# Patient Record
Sex: FEMALE | Race: OTHER | NOT HISPANIC OR LATINO | URBAN - METROPOLITAN AREA
[De-identification: names, ages, dates, MRNs, and addresses within clinical notes are randomized per-mention and may not be internally consistent; named-entity substitution may affect disease eponyms.]

---

## 2017-02-04 RX ORDER — IPRATROPIUM/ALBUTEROL SULFATE 18-103MCG
0 AEROSOL WITH ADAPTER (GRAM) INHALATION
Qty: 12 | Refills: 0 | COMMUNITY
Start: 2017-02-04

## 2017-02-16 RX ORDER — LISINOPRIL 2.5 MG/1
0 TABLET ORAL
Qty: 90 | Refills: 0 | COMMUNITY
Start: 2017-02-16

## 2017-02-24 RX ORDER — DICLOFENAC SODIUM 30 MG/G
0 GEL TOPICAL
Qty: 100 | Refills: 0 | COMMUNITY
Start: 2017-02-24

## 2017-04-12 RX ORDER — SIMVASTATIN 20 MG/1
0 TABLET, FILM COATED ORAL
Qty: 90 | Refills: 0 | COMMUNITY
Start: 2017-04-12

## 2017-05-25 ENCOUNTER — EMERGENCY (EMERGENCY)
Facility: HOSPITAL | Age: 57
LOS: 1 days | Discharge: HOME | End: 2017-05-25
Attending: EMERGENCY MEDICINE | Admitting: EMERGENCY MEDICINE
Payer: COMMERCIAL

## 2017-05-25 VITALS
OXYGEN SATURATION: 99 % | TEMPERATURE: 98 F | HEART RATE: 78 BPM | RESPIRATION RATE: 16 BRPM | DIASTOLIC BLOOD PRESSURE: 95 MMHG | SYSTOLIC BLOOD PRESSURE: 146 MMHG

## 2017-05-25 DIAGNOSIS — R20.0 ANESTHESIA OF SKIN: ICD-10-CM

## 2017-05-25 LAB
ALBUMIN SERPL ELPH-MCNC: 4.3 G/DL — SIGNIFICANT CHANGE UP (ref 3.3–5)
ALP SERPL-CCNC: 101 U/L — SIGNIFICANT CHANGE UP (ref 40–120)
ALT FLD-CCNC: 40 U/L — HIGH (ref 4–33)
AST SERPL-CCNC: 28 U/L — SIGNIFICANT CHANGE UP (ref 4–32)
BASOPHILS # BLD AUTO: 0.05 K/UL — SIGNIFICANT CHANGE UP (ref 0–0.2)
BASOPHILS NFR BLD AUTO: 0.5 % — SIGNIFICANT CHANGE UP (ref 0–2)
BILIRUB SERPL-MCNC: 0.3 MG/DL — SIGNIFICANT CHANGE UP (ref 0.2–1.2)
BUN SERPL-MCNC: 14 MG/DL — SIGNIFICANT CHANGE UP (ref 7–23)
CALCIUM SERPL-MCNC: 9.7 MG/DL — SIGNIFICANT CHANGE UP (ref 8.4–10.5)
CHLORIDE SERPL-SCNC: 101 MMOL/L — SIGNIFICANT CHANGE UP (ref 98–107)
CO2 SERPL-SCNC: 25 MMOL/L — SIGNIFICANT CHANGE UP (ref 22–31)
CREAT SERPL-MCNC: 0.91 MG/DL — SIGNIFICANT CHANGE UP (ref 0.5–1.3)
EOSINOPHIL # BLD AUTO: 0.53 K/UL — HIGH (ref 0–0.5)
EOSINOPHIL NFR BLD AUTO: 5.4 % — SIGNIFICANT CHANGE UP (ref 0–6)
GLUCOSE SERPL-MCNC: 156 MG/DL — HIGH (ref 70–99)
HCT VFR BLD CALC: 42.2 % — SIGNIFICANT CHANGE UP (ref 34.5–45)
HGB BLD-MCNC: 13.6 G/DL — SIGNIFICANT CHANGE UP (ref 11.5–15.5)
IMM GRANULOCYTES NFR BLD AUTO: 0.6 % — SIGNIFICANT CHANGE UP (ref 0–1.5)
LYMPHOCYTES # BLD AUTO: 4.72 K/UL — HIGH (ref 1–3.3)
LYMPHOCYTES # BLD AUTO: 47.8 % — HIGH (ref 13–44)
MCHC RBC-ENTMCNC: 29.4 PG — SIGNIFICANT CHANGE UP (ref 27–34)
MCHC RBC-ENTMCNC: 32.2 % — SIGNIFICANT CHANGE UP (ref 32–36)
MCV RBC AUTO: 91.1 FL — SIGNIFICANT CHANGE UP (ref 80–100)
MONOCYTES # BLD AUTO: 1.08 K/UL — HIGH (ref 0–0.9)
MONOCYTES NFR BLD AUTO: 10.9 % — SIGNIFICANT CHANGE UP (ref 2–14)
NEUTROPHILS # BLD AUTO: 3.43 K/UL — SIGNIFICANT CHANGE UP (ref 1.8–7.4)
NEUTROPHILS NFR BLD AUTO: 34.8 % — LOW (ref 43–77)
PLATELET # BLD AUTO: 232 K/UL — SIGNIFICANT CHANGE UP (ref 150–400)
PMV BLD: 10.4 FL — SIGNIFICANT CHANGE UP (ref 7–13)
POTASSIUM SERPL-MCNC: 4.3 MMOL/L — SIGNIFICANT CHANGE UP (ref 3.5–5.3)
POTASSIUM SERPL-SCNC: 4.3 MMOL/L — SIGNIFICANT CHANGE UP (ref 3.5–5.3)
PROT SERPL-MCNC: 7.4 G/DL — SIGNIFICANT CHANGE UP (ref 6–8.3)
RBC # BLD: 4.63 M/UL — SIGNIFICANT CHANGE UP (ref 3.8–5.2)
RBC # FLD: 13.5 % — SIGNIFICANT CHANGE UP (ref 10.3–14.5)
SODIUM SERPL-SCNC: 141 MMOL/L — SIGNIFICANT CHANGE UP (ref 135–145)
WBC # BLD: 9.87 K/UL — SIGNIFICANT CHANGE UP (ref 3.8–10.5)
WBC # FLD AUTO: 9.87 K/UL — SIGNIFICANT CHANGE UP (ref 3.8–10.5)

## 2017-05-25 PROCEDURE — 99220: CPT

## 2017-05-25 PROCEDURE — 70450 CT HEAD/BRAIN W/O DYE: CPT | Mod: 26

## 2017-05-25 PROCEDURE — 99244 OFF/OP CNSLTJ NEW/EST MOD 40: CPT | Mod: GC

## 2017-05-25 RX ORDER — SIMVASTATIN 20 MG/1
10 TABLET, FILM COATED ORAL AT BEDTIME
Qty: 0 | Refills: 0 | Status: DISCONTINUED | OUTPATIENT
Start: 2017-05-25 | End: 2017-05-29

## 2017-05-25 RX ORDER — LISINOPRIL 2.5 MG/1
10 TABLET ORAL DAILY
Qty: 0 | Refills: 0 | Status: DISCONTINUED | OUTPATIENT
Start: 2017-05-25 | End: 2017-05-29

## 2017-05-25 RX ORDER — ACETAMINOPHEN 500 MG
975 TABLET ORAL ONCE
Qty: 0 | Refills: 0 | Status: COMPLETED | OUTPATIENT
Start: 2017-05-25 | End: 2017-05-25

## 2017-05-25 RX ADMIN — Medication 975 MILLIGRAM(S): at 21:51

## 2017-05-25 NOTE — ED ADULT NURSE NOTE - CHIEF COMPLAINT QUOTE
Sent in by PMD for left facial numbness and slurred speech since 12-1 PM. C/o left facial numbness/change in sensation. No other focal deficiet. PMH Bell's palsy, TIA, New onset DM. Dr. Hubbard evaluated pt. No code stroke.

## 2017-05-25 NOTE — ED PROVIDER NOTE - CHIEF COMPLAINT
The patient is a 57y Female complaining of L-facial heaviness; MHx of CVA - same symptoms; onset 6hrs ago.

## 2017-05-25 NOTE — ED PROVIDER NOTE - ATTENDING CONTRIBUTION TO CARE
MD Gonzales:  patient seen and evaluated with the resident.  I was present for key portions of the History & Physical, and I agree with the Impression & Plan.  MD Gonzales:  56 yo F, c/o L sided facial droop MD Gonzales:  patient seen and evaluated with the resident.  I was present for key portions of the History & Physical, and I agree with the Impression & Plan.  MD Gonzales:  58 yo F, c/o L sided facial droop; onset 12pm.  Quality:  L sided facial heaviness; much like prior CVA with same symptoms.  No weakness/numbness.  +LEAVITT on L.  No neck pain.  No photo/phonophobia.  No ataxia/dizziness.  VS - wnl.  Physical Exam: adult F, NAD, NCAT, no appreciable facial droop, strength 5/5 bilateral extremities & symmetric.  Ambulates w/o difficulty.  Impression:  TIA (subjective symptoms, resolving, > 6 hrs ago).  Plan:  CT head, neuro consult, reassess.

## 2017-05-25 NOTE — ED CDU PROVIDER NOTE - ATTENDING CONTRIBUTION TO CARE
MD Gonzales:  I have personally performed a face to face diagnostic evaluation on this patient with the PA.  I have reviewed the ACP note and agree with the history, exam, and plan of care, except as noted.  History and Exam by me shows 56 yo F, c/o L sided facial droop; onset 12pm.  Quality:  L sided facial heaviness; much like prior CVA with same symptoms.  No weakness/numbness.  +LEAVITT on L.  No neck pain.  No photo/phonophobia.  No ataxia/dizziness.  VS - wnl.  Physical Exam: adult F, NAD, NCAT, no appreciable facial droop, strength 5/5 bilateral extremities & symmetric.  Ambulates w/o difficulty.  Impression:  TIA (subjective symptoms, resolving, > 6 hrs ago).  CT head unremarkable. Seen by Neuro in ED, Plan:  CDU for MRI/MRA, reassess.

## 2017-05-25 NOTE — ED ADULT TRIAGE NOTE - CHIEF COMPLAINT QUOTE
Sent in by PMD for left facial numbness and slurred speech since 12-1 PM. PMH Bell's palsy, TIA, New onset DM. Dr. Hubbard evaluated pt. No code stroke. Sent in by PMD for left facial numbness and slurred speech since 12-1 PM. C/o left facial numbness/change in sensation. No other focal deficiet. PMH Bell's palsy, TIA, New onset DM. Dr. Hubbard evaluated pt. No code stroke.

## 2017-05-25 NOTE — ED PROVIDER NOTE - MEDICAL DECISION MAKING DETAILS
MD Gonzales:  56 yo F, c/o L sided facial droop; onset 12pm.  Quality:  L sided facial heaviness; much like prior CVA with same symptoms.  No weakness/numbness.  +LEAVITT on L.  No neck pain.  No photo/phonophobia.  No ataxia/dizziness.  VS - wnl.  Physical Exam: adult F, NAD, NCAT, no appreciable facial droop, strength 5/5 bilateral extremities & symmetric.  Ambulates w/o difficulty.  Impression:  TIA (subjective symptoms, resolving, > 6 hrs ago).  Plan:  CT head, neuro consult, reassess.

## 2017-05-25 NOTE — CONSULT NOTE ADULT - ATTENDING COMMENTS
Ms. 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 On examination today, she has mild flattening of the left nasolabial fold and her examination is otherwise intact. This is likely chronic from prior Bell's palsy, perhaps brought about by fatigue. MRI brain, MRA head and neck were normal. Okay to discharge home with outpatient follow-up from a neurologic standpoint.

## 2017-05-25 NOTE — ED CDU PROVIDER NOTE - OBJECTIVE STATEMENT
57 YOF multiple medical problems p/w left sided facial droop worse than baseline.  Associated facial tingling, and generalized weakness.  Associated headache.  Last normal 11:00 today.  No other symptoms.  No chest pain or SOB.  No apraxia, aphasia, agnosia, dysphonia, dysarthria, dysphagia, dizziness, nausea, vomiting, or loss of consciousness.    CDU PANDA Troncoso: HPI reviewed above. 57 y.o female pmhx of HTN , HLD and DM CVA 3 yeasr ago, Lavonia Palsy presents with L sided facial tingling/numbness that started this morning. Went to her PMD who sent her here for further evaluation. Pt seen in ED CT head performed, neurology consulted who recommended MRI brain w/o MRA H & N outpatient- ED to have pt sent to CDU to have performed with neuro checks.

## 2017-05-25 NOTE — ED PROVIDER NOTE - PMH
Diabetes Mellitus    GERD (gastroesophageal reflux disease)    HLD (hyperlipidemia)    Pneumonia  April 2015  Seasonal allergies    Transient ischemic attack  July 2014  Uterine Cancer    Vitamin D deficiency

## 2017-05-25 NOTE — ED CDU PROVIDER NOTE - PROGRESS NOTE DETAILS
Dr. Sanchez:  I performed a face to face bedside interview with patient regarding history of present illness, review of symptoms and past medical history. I completed an independent physical exam.  I have discussed patient's plan of care with PA.   I agree with note as stated above, having amended the EMR as needed to reflect my findings.   This includes HISTORY OF PRESENT ILLNESS, HIV, PAST MEDICAL/SURGICAL/FAMILY/SOCIAL HISTORY, ALLERGIES AND HOME MEDICATIONS, REVIEW OF SYSTEMS, PHYSICAL EXAM, and any PROGRESS NOTES during the time I functioned as the attending physician for this patient.    57F h/o CVA with mild left sided facial weakness and left facial Mari's Palsy presents with left face "heaviness" sensation, and states she had weakness yesterday.  Exam: well appearing, rrr, ctab, abd ntnd, sensation equal bilateral face, mild swelling to left face with barely perceptible droop.  Pending MRI to evaluate for new stroke. PA Ernie: Pt admits feeling better than yesterday, but still feel heaviness on the left side of the face. Awaiting MRA head w/o contrast and neck w contrast, MRI head w/o contrast.  Will monitor and reassess. CDU ATTENDING YAIMA DISCHARGE NOTE:  MRI negative for acute findings, okay to discharge, will follow up with neurology outpatient.

## 2017-05-25 NOTE — ED PROVIDER NOTE - PROGRESS NOTE DETAILS
JW PT evaluated by neurology agree no focal findings on exam.  Pt sent to CDU for rule out CVA: MRI and obs.

## 2017-05-25 NOTE — ED PROVIDER NOTE - OBJECTIVE STATEMENT
57 YOF multiple medical problems p/w left sided facial droop worse than baseline.  Associated facial tingling, and generalized weakness.  Associated headache.  Last normal 11:00 today.  No other symptoms.  No chest pain or SOB.  No apraxia, aphasia, agnosia, dysphonia, dysarthria, dysphagia, dizziness, nausea, vomiting, or loss of consciousness.
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
## 2017-05-25 NOTE — CONSULT NOTE ADULT - PROBLEM SELECTOR RECOMMENDATION 9
Recommend TIA workup: MRI brain w/o MRA H & N  Start ASA 81mg daily if no contraindications.  Optimize risk factors: BP, Cholesterol, Diabetes. Obtain CTH.  If normal, then MRI brain w/o MRA H & N as outpatient  Start ASA 81mg daily if no contraindications.  Optimize risk factors: BP, Cholesterol, Diabetes. Obtain CTH.  If normal, then MRI brain w/o MRA H & N as outpatient to r/o TIA  Start ASA 81mg daily if no contraindications.  Optimize risk factors: BP, Cholesterol, Diabetes.  Pain control for neck/head

## 2017-05-25 NOTE — ED ADULT NURSE NOTE - OBJECTIVE STATEMENT
Pt recd A+Ox3. C/o left sided facial numbness since 12:30pm with head ache, neck pain and fatigue. Pt reports that she feels as if her speech is slurred but no slurring noticed on assessment. Denies any extremity weakness, CP or SOB. No facial droop noted on assessment. B/l UE and LE with equal strength and movement. PMH of CVA with no residual weakness and bells palsy. Cardiac monitor in place. Will continue to monitor.

## 2017-05-25 NOTE — ED CDU PROVIDER NOTE - MEDICAL DECISION MAKING DETAILS
57 y.o female pmhx of HTN, HLD , DM, CVA here with L sided facial tingling and numbness. MRI brain w/o MRA head & neck, neuro.

## 2017-05-26 VITALS
RESPIRATION RATE: 14 BRPM | OXYGEN SATURATION: 96 % | HEART RATE: 76 BPM | SYSTOLIC BLOOD PRESSURE: 135 MMHG | DIASTOLIC BLOOD PRESSURE: 70 MMHG | TEMPERATURE: 98 F

## 2017-05-26 LAB — HBA1C BLD-MCNC: 7 % — HIGH (ref 4–5.6)

## 2017-05-26 PROCEDURE — 70548 MR ANGIOGRAPHY NECK W/DYE: CPT | Mod: 26

## 2017-05-26 PROCEDURE — 70551 MRI BRAIN STEM W/O DYE: CPT | Mod: 26

## 2017-05-26 PROCEDURE — 99217: CPT

## 2017-05-26 RX ADMIN — LISINOPRIL 10 MILLIGRAM(S): 2.5 TABLET ORAL at 11:31

## 2017-05-26 RX ADMIN — SIMVASTATIN 10 MILLIGRAM(S): 20 TABLET, FILM COATED ORAL at 00:09

## 2017-05-26 RX ADMIN — Medication 2 MILLIGRAM(S): at 09:17

## 2017-06-20 RX ORDER — ALBUTEROL 90 UG/1
0 AEROSOL, METERED ORAL
Qty: 6 | Refills: 0 | COMMUNITY
Start: 2017-06-20

## 2017-06-20 RX ORDER — HYDROXYZINE HCL 10 MG
0 TABLET ORAL
Qty: 20 | Refills: 0 | COMMUNITY
Start: 2017-06-20

## 2017-06-20 RX ORDER — MONTELUKAST 4 MG/1
0 TABLET, CHEWABLE ORAL
Qty: 30 | Refills: 0 | COMMUNITY
Start: 2017-06-20

## 2017-06-20 RX ORDER — FLUTICASONE PROPIONATE AND SALMETEROL 50; 250 UG/1; UG/1
0 POWDER ORAL; RESPIRATORY (INHALATION)
Qty: 60 | Refills: 0 | COMMUNITY
Start: 2017-06-20

## 2017-06-24 RX ORDER — GABAPENTIN 400 MG/1
0 CAPSULE ORAL
Qty: 90 | Refills: 0 | COMMUNITY
Start: 2017-06-24

## 2017-06-24 RX ORDER — LIDOCAINE 4 G/100G
0 CREAM TOPICAL
Qty: 30 | Refills: 0 | COMMUNITY
Start: 2017-06-24

## 2017-08-15 DIAGNOSIS — Z87.09 PERSONAL HISTORY OF OTHER DISEASES OF THE RESPIRATORY SYSTEM: ICD-10-CM

## 2017-08-16 ENCOUNTER — INPATIENT (INPATIENT)
Facility: HOSPITAL | Age: 57
LOS: 1 days | Discharge: HOME | End: 2017-08-18
Attending: HOSPITALIST | Admitting: HOSPITALIST
Payer: COMMERCIAL

## 2017-08-16 VITALS
RESPIRATION RATE: 16 BRPM | TEMPERATURE: 99 F | OXYGEN SATURATION: 100 % | DIASTOLIC BLOOD PRESSURE: 88 MMHG | HEART RATE: 79 BPM | SYSTOLIC BLOOD PRESSURE: 149 MMHG

## 2017-08-16 LAB
ALBUMIN SERPL ELPH-MCNC: 4.3 G/DL — SIGNIFICANT CHANGE UP (ref 3.3–5)
ALP SERPL-CCNC: 84 U/L — SIGNIFICANT CHANGE UP (ref 40–120)
ALT FLD-CCNC: 30 U/L — SIGNIFICANT CHANGE UP (ref 4–33)
AST SERPL-CCNC: 35 U/L — HIGH (ref 4–32)
BASE EXCESS BLDV CALC-SCNC: -1.1 MMOL/L — SIGNIFICANT CHANGE UP
BASOPHILS # BLD AUTO: 0.06 K/UL — SIGNIFICANT CHANGE UP (ref 0–0.2)
BASOPHILS NFR BLD AUTO: 0.5 % — SIGNIFICANT CHANGE UP (ref 0–2)
BILIRUB SERPL-MCNC: 1.1 MG/DL — SIGNIFICANT CHANGE UP (ref 0.2–1.2)
BLOOD GAS VENOUS - CREATININE: 0.5 MG/DL — SIGNIFICANT CHANGE UP (ref 0.5–1.3)
BUN SERPL-MCNC: 9 MG/DL — SIGNIFICANT CHANGE UP (ref 7–23)
CALCIUM SERPL-MCNC: 9.2 MG/DL — SIGNIFICANT CHANGE UP (ref 8.4–10.5)
CHLORIDE BLDV-SCNC: 107 MMOL/L — SIGNIFICANT CHANGE UP (ref 96–108)
CHLORIDE SERPL-SCNC: 100 MMOL/L — SIGNIFICANT CHANGE UP (ref 98–107)
CO2 SERPL-SCNC: 24 MMOL/L — SIGNIFICANT CHANGE UP (ref 22–31)
CREAT SERPL-MCNC: 0.75 MG/DL — SIGNIFICANT CHANGE UP (ref 0.5–1.3)
EOSINOPHIL # BLD AUTO: 0.51 K/UL — HIGH (ref 0–0.5)
EOSINOPHIL NFR BLD AUTO: 4.2 % — SIGNIFICANT CHANGE UP (ref 0–6)
GAS PNL BLDV: 130 MMOL/L — LOW (ref 136–146)
GLUCOSE BLDV-MCNC: 129 — HIGH (ref 70–99)
GLUCOSE SERPL-MCNC: 145 MG/DL — HIGH (ref 70–99)
HCO3 BLDV-SCNC: 23 MMOL/L — SIGNIFICANT CHANGE UP (ref 20–27)
HCT VFR BLD CALC: 46.3 % — HIGH (ref 34.5–45)
HCT VFR BLDV CALC: 42.1 % — SIGNIFICANT CHANGE UP (ref 34.5–45)
HGB BLD-MCNC: 15.1 G/DL — SIGNIFICANT CHANGE UP (ref 11.5–15.5)
HGB BLDV-MCNC: 13.7 G/DL — SIGNIFICANT CHANGE UP (ref 11.5–15.5)
IMM GRANULOCYTES # BLD AUTO: 0.05 # — SIGNIFICANT CHANGE UP
IMM GRANULOCYTES NFR BLD AUTO: 0.4 % — SIGNIFICANT CHANGE UP (ref 0–1.5)
LACTATE BLDV-MCNC: 1.8 MMOL/L — SIGNIFICANT CHANGE UP (ref 0.5–2)
LYMPHOCYTES # BLD AUTO: 2.61 K/UL — SIGNIFICANT CHANGE UP (ref 1–3.3)
LYMPHOCYTES # BLD AUTO: 21.6 % — SIGNIFICANT CHANGE UP (ref 13–44)
MCHC RBC-ENTMCNC: 29.4 PG — SIGNIFICANT CHANGE UP (ref 27–34)
MCHC RBC-ENTMCNC: 32.6 % — SIGNIFICANT CHANGE UP (ref 32–36)
MCV RBC AUTO: 90.1 FL — SIGNIFICANT CHANGE UP (ref 80–100)
MONOCYTES # BLD AUTO: 1.46 K/UL — HIGH (ref 0–0.9)
MONOCYTES NFR BLD AUTO: 12.1 % — SIGNIFICANT CHANGE UP (ref 2–14)
NEUTROPHILS # BLD AUTO: 7.38 K/UL — SIGNIFICANT CHANGE UP (ref 1.8–7.4)
NEUTROPHILS NFR BLD AUTO: 61.2 % — SIGNIFICANT CHANGE UP (ref 43–77)
NRBC # FLD: 0 — SIGNIFICANT CHANGE UP
PCO2 BLDV: 46 MMHG — SIGNIFICANT CHANGE UP (ref 41–51)
PH BLDV: 7.34 PH — SIGNIFICANT CHANGE UP (ref 7.32–7.43)
PLATELET # BLD AUTO: 195 K/UL — SIGNIFICANT CHANGE UP (ref 150–400)
PMV BLD: 10.7 FL — SIGNIFICANT CHANGE UP (ref 7–13)
PO2 BLDV: 54 MMHG — HIGH (ref 35–40)
POTASSIUM BLDV-SCNC: 9.5 MMOL/L — CRITICAL HIGH (ref 3.4–4.5)
POTASSIUM SERPL-MCNC: 5 MMOL/L — SIGNIFICANT CHANGE UP (ref 3.5–5.3)
POTASSIUM SERPL-SCNC: 5 MMOL/L — SIGNIFICANT CHANGE UP (ref 3.5–5.3)
PROT SERPL-MCNC: 7.7 G/DL — SIGNIFICANT CHANGE UP (ref 6–8.3)
RBC # BLD: 5.14 M/UL — SIGNIFICANT CHANGE UP (ref 3.8–5.2)
RBC # FLD: 13.1 % — SIGNIFICANT CHANGE UP (ref 10.3–14.5)
SAO2 % BLDV: 86.6 % — HIGH (ref 60–85)
SODIUM SERPL-SCNC: 140 MMOL/L — SIGNIFICANT CHANGE UP (ref 135–145)
WBC # BLD: 12.07 K/UL — HIGH (ref 3.8–10.5)
WBC # FLD AUTO: 12.07 K/UL — HIGH (ref 3.8–10.5)

## 2017-08-16 RX ORDER — ONDANSETRON 8 MG/1
4 TABLET, FILM COATED ORAL ONCE
Qty: 0 | Refills: 0 | Status: COMPLETED | OUTPATIENT
Start: 2017-08-16 | End: 2017-08-16

## 2017-08-16 RX ORDER — MORPHINE SULFATE 50 MG/1
4 CAPSULE, EXTENDED RELEASE ORAL ONCE
Qty: 0 | Refills: 0 | Status: DISCONTINUED | OUTPATIENT
Start: 2017-08-16 | End: 2017-08-16

## 2017-08-16 RX ORDER — DEXAMETHASONE 0.5 MG/5ML
10 ELIXIR ORAL ONCE
Qty: 0 | Refills: 0 | Status: COMPLETED | OUTPATIENT
Start: 2017-08-16 | End: 2017-08-16

## 2017-08-16 RX ORDER — SODIUM CHLORIDE 9 MG/ML
1000 INJECTION INTRAMUSCULAR; INTRAVENOUS; SUBCUTANEOUS ONCE
Qty: 0 | Refills: 0 | Status: COMPLETED | OUTPATIENT
Start: 2017-08-16 | End: 2017-08-16

## 2017-08-16 RX ADMIN — ONDANSETRON 4 MILLIGRAM(S): 8 TABLET, FILM COATED ORAL at 23:09

## 2017-08-16 RX ADMIN — MORPHINE SULFATE 4 MILLIGRAM(S): 50 CAPSULE, EXTENDED RELEASE ORAL at 22:44

## 2017-08-16 RX ADMIN — Medication 100 MILLIGRAM(S): at 23:54

## 2017-08-16 RX ADMIN — MORPHINE SULFATE 4 MILLIGRAM(S): 50 CAPSULE, EXTENDED RELEASE ORAL at 23:08

## 2017-08-16 RX ADMIN — SODIUM CHLORIDE 1000 MILLILITER(S): 9 INJECTION INTRAMUSCULAR; INTRAVENOUS; SUBCUTANEOUS at 22:44

## 2017-08-16 NOTE — CONSULT NOTE ADULT - ASSESSMENT
57F w/ acute pharyngitis w/ concern for possible abscess  -no acute ORL intervention  -decadron 10mg IV x 1  -clindamycin 900mg IV x 1, likely transition to 7 day course of PO clinda  -f/u CT neck w/ contrast  -Dr. Painting to scope  -will f/u imaging  -upon discharge, can f/u [phone]  -call with questions 57F w/ supraglottitis w/ concern for possible abscess along posterior pharynx  -no acute ORL intervention  -decadron 10mg IV q8 x 3 doses  -clindamycin 900mg IV q8  -ceftriaxone 1g q24  -f/u CT neck w/ contrast  -cont pulse ox  -supporitive o2 if needed, humidified  -will plan for repeat scope tomorrow morning  -page ORL stat if any change in respiratory status  -d/w Dr. Painting  -will follow 57F w/ supraglottitis w/ concern for possible abscess along posterior pharynx  -no acute ORL intervention  -decadron 10mg IV q8 x 3 doses  -clindamycin 900mg IV q8  -ceftriaxone 1g q24  -ppi  -f/u CT neck w/ contrast  -cont pulse ox  -supporitive o2 if needed, humidified  -will plan for repeat scope tomorrow morning  -page ORL stat if any change in respiratory status  -d/w Dr. Painting  -will follow

## 2017-08-16 NOTE — CONSULT NOTE ADULT - SUBJECTIVE AND OBJECTIVE BOX
57F PMH diabetes, uterine ca p/w severe throat pain over past 2-3 days. Was sent from [facility] for concern for possible pharyngeal abscess. Patient states the pain has been progressively worsening with inability to take PO solid or liquids 2/2 pain. States the pain is on both sides and notes b/l ear pain. Denies any recent illnesses, fevers, n/v, CP, SOB, smoking history, rhinorrhea/nasal congestion, sick contacts.    AVSS  NAD, lying in bed  nonlabored breathign on RA, no stridor, no retractions, muffled voice  EOMI, PERRL  b/l EAC clear, TM clear and wnl  NC clear  OC/OP: tonsils 1+ b/l, marked erythema of the posterior pharyngeal wall with mild edema and fullness b/l, small amount of thick, purulent appearing mucus along R posterior naso-oropharynx  neck soft, flat, mild ttp midline, no fluctuance, FROM    wbc 12    FOE: Dr. Painting to scope 57F PMH diabetes, uterine ca p/w severe throat pain over past 2-3 days. Was sent from [facility] for concern for possible pharyngeal abscess. Patient states the pain has been progressively worsening with inability to take PO solid or liquids 2/2 pain. States the pain is on both sides and notes b/l ear pain. Denies any recent illnesses, fevers, n/v, CP, SOB, smoking history, rhinorrhea/nasal congestion, sick contacts.    AVSS  NAD, lying in bed  nonlabored breathign on RA, no stridor, no retractions, muffled voice  EOMI, PERRL  b/l EAC clear, TM clear and wnl  NC clear  OC/OP: tonsils 1+ b/l, marked erythema of the posterior pharyngeal wall with mild edema and fullness b/l, small amount of thick, purulent appearing mucus along R posterior naso-oropharynx  neck soft, flat, mild ttp midline, no fluctuance, FROM    wbc 12    FOE: nc clear  np clear  op w/ erythema and mild posterior pharyngeal swelling  epiglottis sharp, minimal erythema, no noted swelling  ae folds sharp  b/l arytenoids with erythema and swelling obstructing posterior 1/3 of VC  b/l TVC mobility noted, no swelling  subglottis patent, airway patent  hypopharynx wnl  lingual tonsils erythematous and mildly swollen

## 2017-08-16 NOTE — ED ADULT NURSE NOTE - OBJECTIVE STATEMENT
(facilitating) Pt presents to ED TRA c/o 10/10 throat pain with difficulty swallowing, B/L ear pain, chills, headache x3 days, worsening. Denies fevers/ abd pain/ N/V/ SOB/ CP/ urinary changes/ other acute symptoms. pt is in NAD, respirations even and unlabored. PMH CVA with residual facial weakness; no facial droop noted. VSS. IV inserted, BW collected and sent to lab. Meds administered as per EMAR. Report endorsed to Daniella REDDY.

## 2017-08-16 NOTE — ED PROVIDER NOTE - OBJECTIVE STATEMENT
57 F with hx of DM, presents with sore throat worsening over past few days, no fever/chills, had difficulty swallowing but no difficulty breathing, increased secretions.  Seen at urgent care who sent patient to ER to r/o abscess.

## 2017-08-16 NOTE — ED PROVIDER NOTE - PROGRESS NOTE DETAILS
ENT resident scoped patient, saw some irritation in pharynx, recommending clindamycin and dexamethasone and CT scan. Duc PIEDRA: CT scan with no abscess but small cystic lesion in right aryeepiglotic fold ?early abscess vs neoplasm.  Pt also noted to be slightly hypoxic - sat 94-95% on room air.  She c/o some pain to the throat but improved.  No SOB, voice changes, drooling, stridor.  ENT is aware, requesting CDU for observation (requests notification if acute resp distress or O2sat <90%), cont IV abx (clinda and ctx), decadron.  They plan to re-scope and reassess in the morning.

## 2017-08-16 NOTE — ED ADULT TRIAGE NOTE - CHIEF COMPLAINT QUOTE
Pt sent from urgent care for r/o pharyngeal abscess. Pt has been having sore throat and difficulty swallowing since Monday. Denies fevers/chills. H/o DM, asthma, CVA, MI, uterine CA.

## 2017-08-17 DIAGNOSIS — R13.10 DYSPHAGIA, UNSPECIFIED: ICD-10-CM

## 2017-08-17 DIAGNOSIS — I10 ESSENTIAL (PRIMARY) HYPERTENSION: ICD-10-CM

## 2017-08-17 DIAGNOSIS — J04.30 SUPRAGLOTTITIS, UNSPECIFIED, WITHOUT OBSTRUCTION: ICD-10-CM

## 2017-08-17 DIAGNOSIS — C55 MALIGNANT NEOPLASM OF UTERUS, PART UNSPECIFIED: ICD-10-CM

## 2017-08-17 DIAGNOSIS — E11.9 TYPE 2 DIABETES MELLITUS WITHOUT COMPLICATIONS: ICD-10-CM

## 2017-08-17 DIAGNOSIS — J45.909 UNSPECIFIED ASTHMA, UNCOMPLICATED: ICD-10-CM

## 2017-08-17 DIAGNOSIS — Z29.9 ENCOUNTER FOR PROPHYLACTIC MEASURES, UNSPECIFIED: ICD-10-CM

## 2017-08-17 DIAGNOSIS — R23.8 OTHER SKIN CHANGES: ICD-10-CM

## 2017-08-17 DIAGNOSIS — Z90.710 ACQUIRED ABSENCE OF BOTH CERVIX AND UTERUS: Chronic | ICD-10-CM

## 2017-08-17 DIAGNOSIS — G45.9 TRANSIENT CEREBRAL ISCHEMIC ATTACK, UNSPECIFIED: ICD-10-CM

## 2017-08-17 LAB — HBA1C BLD-MCNC: 6.7 % — HIGH (ref 4–5.6)

## 2017-08-17 PROCEDURE — 71020: CPT | Mod: 26

## 2017-08-17 PROCEDURE — 70491 CT SOFT TISSUE NECK W/DYE: CPT | Mod: 26

## 2017-08-17 PROCEDURE — 99233 SBSQ HOSP IP/OBS HIGH 50: CPT | Mod: GC

## 2017-08-17 RX ORDER — ACETAMINOPHEN 500 MG
1000 TABLET ORAL ONCE
Qty: 0 | Refills: 0 | Status: COMPLETED | OUTPATIENT
Start: 2017-08-17 | End: 2017-08-17

## 2017-08-17 RX ORDER — ENOXAPARIN SODIUM 100 MG/ML
40 INJECTION SUBCUTANEOUS EVERY 24 HOURS
Qty: 0 | Refills: 0 | Status: DISCONTINUED | OUTPATIENT
Start: 2017-08-17 | End: 2017-08-18

## 2017-08-17 RX ORDER — INSULIN LISPRO 100/ML
VIAL (ML) SUBCUTANEOUS
Qty: 0 | Refills: 0 | Status: DISCONTINUED | OUTPATIENT
Start: 2017-08-17 | End: 2017-08-18

## 2017-08-17 RX ORDER — PANTOPRAZOLE SODIUM 20 MG/1
40 TABLET, DELAYED RELEASE ORAL DAILY
Qty: 0 | Refills: 0 | Status: DISCONTINUED | OUTPATIENT
Start: 2017-08-17 | End: 2017-08-18

## 2017-08-17 RX ORDER — DEXAMETHASONE 0.5 MG/5ML
10 ELIXIR ORAL
Qty: 0 | Refills: 0 | Status: COMPLETED | OUTPATIENT
Start: 2017-08-17 | End: 2017-08-17

## 2017-08-17 RX ORDER — CEFTRIAXONE 500 MG/1
1 INJECTION, POWDER, FOR SOLUTION INTRAMUSCULAR; INTRAVENOUS EVERY 24 HOURS
Qty: 0 | Refills: 0 | Status: DISCONTINUED | OUTPATIENT
Start: 2017-08-18 | End: 2017-08-18

## 2017-08-17 RX ORDER — KETOROLAC TROMETHAMINE 30 MG/ML
15 SYRINGE (ML) INJECTION ONCE
Qty: 0 | Refills: 0 | Status: DISCONTINUED | OUTPATIENT
Start: 2017-08-17 | End: 2017-08-17

## 2017-08-17 RX ORDER — DEXTROSE 50 % IN WATER 50 %
12.5 SYRINGE (ML) INTRAVENOUS ONCE
Qty: 0 | Refills: 0 | Status: DISCONTINUED | OUTPATIENT
Start: 2017-08-17 | End: 2017-08-18

## 2017-08-17 RX ORDER — ACETAMINOPHEN 500 MG
650 TABLET ORAL EVERY 6 HOURS
Qty: 0 | Refills: 0 | Status: DISCONTINUED | OUTPATIENT
Start: 2017-08-17 | End: 2017-08-18

## 2017-08-17 RX ORDER — INSULIN LISPRO 100/ML
VIAL (ML) SUBCUTANEOUS AT BEDTIME
Qty: 0 | Refills: 0 | Status: DISCONTINUED | OUTPATIENT
Start: 2017-08-17 | End: 2017-08-18

## 2017-08-17 RX ORDER — SODIUM CHLORIDE 9 MG/ML
1000 INJECTION INTRAMUSCULAR; INTRAVENOUS; SUBCUTANEOUS
Qty: 0 | Refills: 0 | Status: DISCONTINUED | OUTPATIENT
Start: 2017-08-17 | End: 2017-08-18

## 2017-08-17 RX ORDER — FLUTICASONE PROPIONATE AND SALMETEROL 50; 250 UG/1; UG/1
0 POWDER ORAL; RESPIRATORY (INHALATION)
Qty: 0 | Refills: 0 | COMMUNITY

## 2017-08-17 RX ORDER — CEFTRIAXONE 500 MG/1
1 INJECTION, POWDER, FOR SOLUTION INTRAMUSCULAR; INTRAVENOUS ONCE
Qty: 0 | Refills: 0 | Status: COMPLETED | OUTPATIENT
Start: 2017-08-17 | End: 2017-08-17

## 2017-08-17 RX ADMIN — PANTOPRAZOLE SODIUM 40 MILLIGRAM(S): 20 TABLET, DELAYED RELEASE ORAL at 14:11

## 2017-08-17 RX ADMIN — Medication 102 MILLIGRAM(S): at 18:17

## 2017-08-17 RX ADMIN — Medication 100 MILLIGRAM(S): at 22:46

## 2017-08-17 RX ADMIN — Medication 650 MILLIGRAM(S): at 23:42

## 2017-08-17 RX ADMIN — Medication 650 MILLIGRAM(S): at 17:31

## 2017-08-17 RX ADMIN — Medication 1000 MILLIGRAM(S): at 09:15

## 2017-08-17 RX ADMIN — CEFTRIAXONE 100 GRAM(S): 500 INJECTION, POWDER, FOR SOLUTION INTRAMUSCULAR; INTRAVENOUS at 02:53

## 2017-08-17 RX ADMIN — Medication 1000 MILLIGRAM(S): at 02:25

## 2017-08-17 RX ADMIN — Medication 100 MILLIGRAM(S): at 14:17

## 2017-08-17 RX ADMIN — Medication 102 MILLIGRAM(S): at 02:10

## 2017-08-17 RX ADMIN — Medication 15 MILLIGRAM(S): at 05:25

## 2017-08-17 RX ADMIN — SODIUM CHLORIDE 125 MILLILITER(S): 9 INJECTION INTRAMUSCULAR; INTRAVENOUS; SUBCUTANEOUS at 06:40

## 2017-08-17 RX ADMIN — Medication 400 MILLIGRAM(S): at 01:55

## 2017-08-17 RX ADMIN — Medication 1: at 17:33

## 2017-08-17 RX ADMIN — Medication 650 MILLIGRAM(S): at 18:00

## 2017-08-17 RX ADMIN — Medication 400 MILLIGRAM(S): at 08:56

## 2017-08-17 RX ADMIN — ENOXAPARIN SODIUM 40 MILLIGRAM(S): 100 INJECTION SUBCUTANEOUS at 22:45

## 2017-08-17 RX ADMIN — SODIUM CHLORIDE 125 MILLILITER(S): 9 INJECTION INTRAMUSCULAR; INTRAVENOUS; SUBCUTANEOUS at 14:17

## 2017-08-17 RX ADMIN — Medication 102 MILLIGRAM(S): at 10:22

## 2017-08-17 RX ADMIN — Medication 100 MILLIGRAM(S): at 06:54

## 2017-08-17 NOTE — CONSULT NOTE ADULT - ENMT COMMENTS
as per hpi
Flexible Fiberoptic Laryngoscopy: clear nasopharynx to glottis, true vocal cords mobile bilaterally, mild supraglottis edema, airway widely patent

## 2017-08-17 NOTE — H&P ADULT - PROBLEM SELECTOR PLAN 3
HgA1C was 6.7 on day prior to admission, pt on one oral agent at home  - ISS  - FS testing with meals and at bedtime HgA1C was 6.7 on day prior to admission, pt on one oral agent at home. Blood glucose may increase due to steroids.  - ISS  - FS testing with meals and at bedtime

## 2017-08-17 NOTE — H&P ADULT - PROBLEM SELECTOR PLAN 1
Multiple fiberoptic scopes performed by ENT. Pt afebrile and vitally stable, but now with leukocytosis.  - continue ceftriaxone 1g Q24H and clindamycin 900mg Q8H  - Pt saturating well on room air and denies SOB, will escalate to 2L NC if she becomes acutely SOB or saturation drops  - routine vital signs  - trend WBC  - page ORL stat if any change in respiratory status Multiple fiberoptic scopes performed by ENT. Pt afebrile and vitally stable, but now with leukocytosis.  - continue ceftriaxone 1g Q24H and clindamycin 900mg Q8H for a total of 7 - 10 days depending on response  - Pt saturating well on room air and denies SOB, will escalate to 2L NC if she becomes acutely SOB or saturation drops  - routine vital signs  - trend WBC  - blood Cx  - page ORL stat if any change in respiratory status

## 2017-08-17 NOTE — H&P ADULT - ATTENDING COMMENTS
Patient seen and examined.  Case discussed with house staff.  Agree with above as edited.  57yoF with hx listed above admitted with supraglottitis vs abscess  Appreciate ENT eval and direct visualization.  Check BCx  C/w IV clinda and ceftriaxone - plan for 7-10 days  Decadron x 3 doses  check viral swab of tongue vesicle to r/o HSV  IVFL until eating better  h/o TIA - will d/w PMD - likely would rec. ASA 81mg on d/c

## 2017-08-17 NOTE — PROGRESS NOTE ADULT - SUBJECTIVE AND OBJECTIVE BOX
Patient seen and examined.    Per overnight staff, patient was desaturating to 92 on RA and was started on 2L NC as a result. Voice remains hoarse and patient had not been able to tolerate PO during the night.    NAD, alert, awake, lying in bed  On 2L nc, no stridor no stertor  Voice hoarse  FOE: stable arytenoid edema and erythema obstructing view of the posterior cords, otherwise wnl    CT neck  Cystic focus centered in the right aryepiglottic fold with fold thickening  and local mass effect with narrowing of the laryngeal vestibule/hypopharynx  and piriform sinus. Cervical adenopathy.  Differential diagnosis considerations include early abscess formation/focal  edema versus cystic neoplasm. Consider direct inspection.    57F w/ supraglottitis w/ concern for possible abscess along posterior pharynx  -no acute ORL intervention  -decadron 10mg IV q8 x 3 doses  -clindamycin 900mg IV q8  -ceftriaxone 1g q24  -ppi  -cont pulse ox  -supportive o2 if needed, humidified  -page ORL stat if any change in respiratory status

## 2017-08-17 NOTE — ED CDU PROVIDER NOTE - CONSTITUTIONAL, MLM
normal... Well appearing, well nourished, awake, alert, oriented to person, place, time/situation and in no apparent distress.  Pt. objectively appears comfortable; pt is verbalizing with hoarseness, but no stridor or drooling; pt is verbalizing in effortless full sentences.

## 2017-08-17 NOTE — H&P ADULT - PROBLEM SELECTOR PLAN 5
Pt has hx of asthma, is prescribed singulair, advair, and a rescue inhaler. Pt not taking any of her home inhalers regularly. Reports she last used rescue inhaler over a month ago and singulair two weeks ago. Currently not wheezing or SOB on exam.  - Duonebs PRN for wheezing  - TIA in 2014, pt currently on no anti-platelet agents at home. Will contact PMD for verification and more records. Hold off on aspirin for now in case pt needs ENT intervention.

## 2017-08-17 NOTE — ED CDU PROVIDER NOTE - PMH
Diabetes Mellitus    GERD (gastroesophageal reflux disease)    HLD (hyperlipidemia)    Pneumonia  April 2015  Seasonal allergies    Transient ischemic attack  July 2014  Uterine Cancer  (diagnosed in 1999; subsequent TAHBSO and RTX)  Vitamin D deficiency

## 2017-08-17 NOTE — CONSULT NOTE ADULT - ASSESSMENT
Assessment: 57F PMH diabetes, uterine ca p/w severe throat pain over past 2-3 days. DDx parapharyngeal abscess, retropharyngeal abscess, pharyngitis versus early Supraglottits, Airway widely patent    Plan:  1) airway observation  2) Clindamycin 900mg IV q8hrs  3) Decadron 10mg IV q8hrs x 3 doses  4) tight BS control

## 2017-08-17 NOTE — ED CDU PROVIDER NOTE - PROGRESS NOTE DETAILS
CDU PA Ernie: Pt PO challenged, was able to keep liquid down with pain. Gave pt acetaminophen IV and placed her on liquid diet. ENT scoped the pt and wants to admit the pt to medicine service. Hospitalist paged, will monitor and reassess CDU PA Ernie: Pt admits to feeling a little better, still looks ill. Stating 99 on nasal cannula. Pt PO challenged, was able to keep liquid down with pain. Gave pt acetaminophen IV and placed her on liquid diet. ENT scoped the pt and wants to admit the pt to medicine service. Hospitalist paged, will monitor and reassess Olya: Seen on AM rounds. Clinically-stable. Olya: Supervised PANDA Klein 8/17/17. Olya (Physician) CDU ATTENDING D/C NOTE for hospital admission: 57 F, DM, sore throat worsening over past few days, no F/C, had difficulty swallowing but no difficulty breathing.  CT neck concerning for abscess vs. neoplasm. Appears well. NAD. AFVSS. Strong pulse. Respirations unlabored. No stridor. In CDU, patient in significant pain despite ABx and steroids and pain medications. Will admit to hospital for continued ABx, pain meds, and ENT f/u.

## 2017-08-17 NOTE — ED CDU PROVIDER NOTE - THROAT FINDINGS
No tonsillar inflammation or exudates noted; structures to posterior pharynx appear symmetrical; uvula midline.  No edema noted.

## 2017-08-17 NOTE — CONSULT NOTE ADULT - SUBJECTIVE AND OBJECTIVE BOX
Objective: 	  57F PMH diabetes, uterine ca p/w severe throat pain over past 2-3 days. [facility] for possible ph[facility] abscess. Patient states the pain has been progressively worsening with inability to take PO solid or liquids 2/2 pain. States the pain is on both sides and notes b/l ear pain. Denies any recent illnesses, fevers, n/v, CP, SOB, smoking history, rhinorrhea/nasal congestion, sick contacts.    · Presenting Symptoms: THROAT PAIN	  · Negative Findings: no fever	  · Location: throat	  · Area: generalized	  · Timing: worsening	  · Duration: day(s)	  · Context: unknown	  · Recent Exposure To: none known	  · Aggravating Factors: none	  · Relieving Factors: none	    HIV:   HIV Status:  · Offered: Declined	    PAST MEDICAL/SURGICAL/FAMILY/SOCIAL HISTORY:   Past Medical History:  Diabetes Mellitus    GERD (gastroesophageal reflux disease)    HLD (hyperlipidemia)    Pneumonia  April 2015  Seasonal allergies    Transient ischemic attack  July 2014  Uterine Cancer    Vitamin D deficiency.    Past Surgical History:  H/O: Hysterectomy    History of Hernia Repair.    Tobacco Usage:  · Tobacco Usage	Never smoker	    ALLERGIES AND HOME MEDICATIONS:   Allergies:        Allergies:  	No Known Allergies:     Home Medications:   * Patient Currently Takes Medications as of 18-Apr-2015 11:59 documented in Order   · 	fluticasone-salmeterol:   Advair 250/50 1 puff two times per day  · 	Singulair 10 mg oral tablet: 1 tab(s) orally once a day (at bedtime)  · 	atorvastatin 20 mg oral tablet: 1 tab(s) orally once a day (at bedtime)  · 	predniSONE 20 mg oral tablet: 1 tab(s) orally once a day  · 	Tanzeum:  subcutaneous. Please modify dose based on your primary care physician's recommendation.    · 	Combivent Respimat CFC free 20 mcg-100 mcg/inh inhalation aerosol: 1 puff(s) inhaled 4 times a day  · 	metFORMIN 500 mg oral tablet: 1 tab(s) orally 2 times a day  · 	Ecotrin 81 mg oral delayed release tablet: 1 tab(s) orally once a day    CBC Full  -  ( 16 Aug 2017 22:35 )  WBC Count : 12.07 K/uL  Hemoglobin : 15.1 g/dL  Hematocrit : 46.3 %  Platelet Count - Automated : 195 K/uL  Mean Cell Volume : 90.1 fL  Mean Cell Hemoglobin : 29.4 pg  Mean Cell Hemoglobin Concentration : 32.6 %  Auto Neutrophil # : 7.38 K/uL  Auto Lymphocyte # : 2.61 K/uL  Auto Monocyte # : 1.46 K/uL  Auto Eosinophil # : 0.51 K/uL  Auto Basophil # : 0.06 K/uL  Auto Neutrophil % : 61.2 %  Auto Lymphocyte % : 21.6 %  Auto Monocyte % : 12.1 %  Auto Eosinophil % : 4.2 %  Auto Basophil % : 0.5 %

## 2017-08-17 NOTE — H&P ADULT - HISTORY OF PRESENT ILLNESS
Pt is a 57F with PMHx of uterine cancer (dx in 1999 s/p ALY-BSO and RTX), DM, asthma, GERD, hyperlipidemia (not on medication), TIA (2014) who presented to ED yesterday with worsening dysphagia and sore throat for a "couple days." Over this time the patient became unable to eat solid foods, but did not have difficulty breathing. She went to an urgent care center who sent the patient to the ED for further evaluation. In the ED she denied F/C/N/V, CP, SOB, drooling, stridor. ENT evaluated the patient and ordered a CT which showed cystic focus centered in right aryepiglotitic fold with fold thickening, local mass effect, and cervical adenopathy, concerning for early abscess formation, focal edema, or cystic neoplasm. After a fiberoptic scope, she was dx with supraglottitis with concern for possible early abscess formation and admitted to the CDU for IV antibiotics and steroids to decrease the swelling. After re-evaluation by ENT on 8/17, it was determined that pt's clinical status was not improving so she was admitted to the general medicine floor for further inpatient management.    At this time the patient states she "feels better" than when she came to the ED and is now able to tolerate soft foods like jello. Her throat is still sore, but she denies F/C/N/V, CP, SOB, or abdominal pain. Pt is a 57F with PMHx of uterine cancer (dx in 1999 s/p ALY-BSO and RTX), DM, asthma, GERD, hyperlipidemia (not on medication), TIA (2014) who presented to ED yesterday with worsening dysphagia and sore throat for a "couple days." Over this time the patient became unable to eat solid foods, but did not have difficulty breathing. She went to an urgent care center who sent the patient to the ED for further evaluation. In the ED she denied F/C/N/V, CP, SOB, drooling, stridor. ENT evaluated the patient and ordered a CT which showed cystic focus centered in right aryepiglotitic fold with fold thickening, local mass effect, and cervical adenopathy, concerning for early abscess formation, focal edema, or cystic neoplasm. After a fiberoptic scope, she was dx with supraglottitis with concern for possible early abscess formation and admitted to the CDU for IV antibiotics and steroids to decrease the swelling. Overnight, she desaturated to 92% on room air, requiring supplemental O2, developed hoarseness of voice, and wasn't able to tolerate PO. After re-evaluation by ENT in AM on 8/17, it was determined that pt's clinical status was not improving so she was admitted to the general medicine floor for further inpatient management.    At this time the patient states she "feels better" than when she came to the ED and is now able to tolerate soft foods like jello. Her voice is less hoarse at this time. She reports her throat is still sore, but she denies F/C/N/V, CP, SOB, or abdominal pain.

## 2017-08-17 NOTE — H&P ADULT - PROBLEM SELECTOR PLAN 4
Pt is s/p ALY-BSO and RTX in 1999, does not see outpatient oncologist. No acute symptoms this admission concerning for recurrence or metastatic disease.

## 2017-08-17 NOTE — ED CDU PROVIDER NOTE - OBJECTIVE STATEMENT
57 F with hx of DM, presents with sore throat worsening over past few days, no fever/chills, had difficulty swallowing but no difficulty breathing, increased secretions.  Seen at urgent care who sent patient to ER to r/o abscess.    CDU PANDA Carrero Note-----  ED Provider Note reviewed per above; pt is a 56 yo female with PMH of uterine cancer (dx in 1999 with TAHBSO and RTX), DM, GERD, remote hyperlipidemia (pt states not a current issue; no meds), TIA (2014), and hx/o pneumonia in 2015 (pt denies hx/o pulmonary disease), who presented to the ED as noted above.  Pt. was evaluated in the ED and ENT was consulted; CT was performed and pt was scoped by ENT; pt dx with supraglottitis with concern for possible early abscess formation; pt was sent to CDU for IV antibiotics / meds per orders, IV hydration, analgesia prn, ENT re-eval/re-scope in am, observation and reassessment.  On CDU assessment, pt verbalizes feeling slight improvement since receiving meds in the ED; states pain "feels like it is starting to come back" but denies SOB, no hx/o drooling / stridor; pt denies other c/o.  CDU plan discussed with pt who verbalizes agreement with plan.

## 2017-08-17 NOTE — H&P ADULT - PROBLEM SELECTOR PLAN 2
Acute onset of odynophagia with CT scan concerning for abscess vs neoplasm. Pt has remote hx of GERD for which she takes no home medications.  - ENT following, F/U recs  - dexamethasone 10mg q8H x3 doses for inflammation and swelling, monitor symptoms for improvement  - Pantoprazole 40mg IV  - IVF: NS @ 125 since pt has poor PO intake from pain

## 2017-08-17 NOTE — H&P ADULT - PROBLEM SELECTOR PLAN 7
DVT ppx: lovenox SubQ  Diet: Clear liquid    Carlotta Kumar, PGY1  Spectra #[id number] DVT ppx: heparin SubQ  Diet: Clear liquid    Carlotta Kumar, PGY1  Spectra #[id number] Pt has hx of hypertension. She is prescribed lisinopril, but does not take any medication at home. Pts BP in hospital consistently in 110s.   - Call pharmacy to verify lisinopril home dose  - monitor BP in hospital, restart home lisinopril if pt becomes hypertensive

## 2017-08-17 NOTE — ED CDU PROVIDER NOTE - MEDICAL DECISION MAKING DETAILS
IV antibiotics / meds per orders, IV hydration, analgesia prn, ENT re-eval/re-scope in am, observation and reassessment.

## 2017-08-17 NOTE — H&P ADULT - PROBLEM SELECTOR PLAN 8
Pt now with vesicles with erythematous base on base of uvula and lateral side of tongue, most likely from barotrauma from multiple fiberoptic scopes.   - Swab and send for viral panel to rule out herpes infection

## 2017-08-17 NOTE — CONSULT NOTE ADULT - COMMENTS
Vital Signs Last 24 Hrs  T(C): 37.1 (16 Aug 2017 20:54), Max: 37.1 (16 Aug 2017 20:54)  T(F): 98.7 (16 Aug 2017 20:54), Max: 98.7 (16 Aug 2017 20:54)  HR: 80 (16 Aug 2017 22:45) (79 - 80)  BP: 108/65 (16 Aug 2017 22:45) (108/65 - 149/88)  BP(mean): --  RR: 16 (16 Aug 2017 22:45) (16 - 16)  SpO2: 99% (16 Aug 2017 22:45) (99% - 100%)

## 2017-08-17 NOTE — ED CDU PROVIDER NOTE - INDICATION FOR OBSERVATION
Other/IV antibiotics / meds per orders, IV hydration, analgesia prn, ENT re-eval/re-scope in am, observation and reassessment.

## 2017-08-17 NOTE — ED CDU PROVIDER NOTE - RESPIRATORY, MLM
Breath sounds clear and equal bilaterally.  O2 sat on room air on exam with resting respirations fluctuates between 94 - 95%.

## 2017-08-17 NOTE — ED CDU PROVIDER NOTE - ATTENDING CONTRIBUTION TO CARE
I performed a face-to-face evaluation of the patient and performed a history and physical examination. I agree with the history and physical examination.    57 F, DM, sore throat worsening over past few days, no F/C, had difficulty swallowing but no difficulty breathing.  CT neck concerning for abscess vs. neoplasm. Patient in significant pain despite ABx and steroids and pain medications. Appears well. NAD. AFVSS. Strong pulse. Respirations unlabored. No stridor. Will admit for continued ABx, pain meds, and ENT f/u. I performed a face-to-face evaluation of the patient and performed a history and physical examination. I agree with the history and physical examination.    57 F, DM, sore throat worsening over past few days, no F/C, had difficulty swallowing but no difficulty breathing.  CT neck concerning for abscess vs. neoplasm. Appears well. NAD. AFVSS. Strong pulse. Respirations unlabored. No stridor. Will admit to CDU for continued ABx, pain meds, and ENT f/u.

## 2017-08-17 NOTE — H&P ADULT - NSHPLABSRESULTS_GEN_ALL_CORE
15.1   12.07 )-----------( 195      ( 16 Aug 2017 22:35 )             46.3       08-16    140  |  100  |  9   ----------------------------<  145<H>  5.0   |  24  |  0.75    Ca    9.2      16 Aug 2017 22:35    TPro  7.7  /  Alb  4.3  /  TBili  1.1  /  DBili  x   /  AST  35<H>  /  ALT  30  /  AlkPhos  84  08-16          CAPILLARY BLOOD GLUCOSE  203 (17 Aug 2017 12:31)

## 2017-08-17 NOTE — H&P ADULT - ASSESSMENT
57F with PMHx of DMII, asthma, uterine cancer dx in 1999 s/p ALY-BSO and RTX, hyperlipidemia off medication, and TIA p/w acute onset of dysphagia concerning for abscess or neoplasm. 57F with PMHx of DMII, asthma, uterine cancer dx in 1999 s/p ALY-BSO and RTX, hyperlipidemia off medication, and TIA p/w acute onset of dysphagia concerning for supraglottitis or abscess.

## 2017-08-17 NOTE — ED CDU PROVIDER NOTE - PSH
H/O total hysterectomy with bilateral salpingo-oophorectomy (BSO)  (in 1999 for diagnosis of uterine cancer)  History of Hernia Repair

## 2017-08-17 NOTE — H&P ADULT - NSHPPHYSICALEXAM_GEN_ALL_CORE
.  VITAL SIGNS:  T(C): 36.5 (08-17-17 @ 12:15), Max: 37.1 (08-16-17 @ 20:54)  T(F): 97.7 (08-17-17 @ 12:15), Max: 98.7 (08-16-17 @ 20:54)  HR: 60 (08-17-17 @ 12:15) (60 - 80)  BP: 123/69 (08-17-17 @ 12:15) (108/65 - 149/88)  BP(mean): --  RR: 18 (08-17-17 @ 12:15) (14 - 18)  SpO2: 99% (08-17-17 @ 12:15) (95% - 100%)  Wt(kg): --    PHYSICAL EXAM:    Constitutional: WDWN resting comfortably in bed; NAD  Head: NC/AT  Eyes: PERRLA, EOMI, clear conjunctiva  ENT: no nasal discharge; no oropharyngeal erythema or exudates, uvula midline, soft tissue around neck minimally swollen and mildly tender to palpation  Neck: supple; no JVD or thyromegaly  Respiratory: CTA B/L; no W/R/R, no retractions  Cardiac: +S1/S2; RRR; no M/R/G; PMI non-displaced  Gastrointestinal: soft, NT/ND; no rebound or guarding; +BS, no hepatosplenomegaly  Extremities: WWP, no clubbing or cyanosis; no peripheral edema  Vascular: 2+ radial, DP/PT pulses B/L  Lymphatic: no submandibular or cervical LAD  Neurologic: AAOx3; CNII-XII grossly intact; no focal deficits, motor 5/5 in UE and LE, Reflexes 2+ in UE and LE b/l .  VITAL SIGNS:  T(C): 36.5 (08-17-17 @ 12:15), Max: 37.1 (08-16-17 @ 20:54)  T(F): 97.7 (08-17-17 @ 12:15), Max: 98.7 (08-16-17 @ 20:54)  HR: 60 (08-17-17 @ 12:15) (60 - 80)  BP: 123/69 (08-17-17 @ 12:15) (108/65 - 149/88)  BP(mean): --  RR: 18 (08-17-17 @ 12:15) (14 - 18)  SpO2: 99% (08-17-17 @ 12:15) (95% - 100%)  Wt(kg): --    PHYSICAL EXAM:    Constitutional: WDWN resting comfortably in bed; NAD  Head: NC/AT  Eyes: PERRLA, EOMI, clear conjunctiva  ENT: no nasal discharge; no oropharyngeal erythema or exudates, uvula midline, small vesicles with erythematous base located at base of uvula, lateral side of tongue, soft tissue around neck minimally swollen and mildly tender to palpation  Neck: supple; no JVD or thyromegaly  Respiratory: CTA B/L; no W/R/R, no retractions  Cardiac: +S1/S2; RRR; no M/R/G; PMI non-displaced  Gastrointestinal: soft, NT/ND; no rebound or guarding; +BS, no hepatosplenomegaly  Extremities: WWP, no clubbing or cyanosis; no peripheral edema  Vascular: 2+ radial, DP/PT pulses B/L  Lymphatic: no submandibular or cervical LAD  Neurologic: AAOx3; CNII-XII grossly intact; no focal deficits, motor 5/5 in UE and LE, Reflexes 2+ in UE and LE b/l

## 2017-08-17 NOTE — H&P ADULT - NSHPREVIEWOFSYSTEMS_GEN_ALL_CORE
REVIEW OF SYSTEMS:    CONSTITUTIONAL: No weakness, fevers or chills  EYES/ENT: No visual changes;  No vertigo or throat pain   NECK: +Throat pain  RESPIRATORY: No cough, wheezing, hemoptysis; No shortness of breath  CARDIOVASCULAR: No chest pain or palpitations  GASTROINTESTINAL: No abdominal or epigastric pain. No nausea, vomiting, or hematemesis; No diarrhea or constipation. No melena or hematochezia.  GENITOURINARY: No dysuria, frequency or hematuria  NEUROLOGICAL: No numbness or weakness  SKIN: No itching, burning, rashes, or lesions   All other review of systems is negative unless indicated above.

## 2017-08-17 NOTE — H&P ADULT - PMH
Diabetes Mellitus    GERD (gastroesophageal reflux disease)    HLD (hyperlipidemia)    Hypertension    Pneumonia  April 2015  Seasonal allergies    Transient ischemic attack  July 2014  Uterine Cancer  (diagnosed in 1999; subsequent TAHBSO and RTX)  Vitamin D deficiency

## 2017-08-18 ENCOUNTER — TRANSCRIPTION ENCOUNTER (OUTPATIENT)
Age: 57
End: 2017-08-18

## 2017-08-18 VITALS
HEART RATE: 69 BPM | OXYGEN SATURATION: 100 % | TEMPERATURE: 98 F | SYSTOLIC BLOOD PRESSURE: 100 MMHG | DIASTOLIC BLOOD PRESSURE: 74 MMHG | RESPIRATION RATE: 17 BRPM

## 2017-08-18 LAB
BUN SERPL-MCNC: 8 MG/DL — SIGNIFICANT CHANGE UP (ref 7–23)
CALCIUM SERPL-MCNC: 9 MG/DL — SIGNIFICANT CHANGE UP (ref 8.4–10.5)
CHLORIDE SERPL-SCNC: 106 MMOL/L — SIGNIFICANT CHANGE UP (ref 98–107)
CO2 SERPL-SCNC: 23 MMOL/L — SIGNIFICANT CHANGE UP (ref 22–31)
CREAT SERPL-MCNC: 0.62 MG/DL — SIGNIFICANT CHANGE UP (ref 0.5–1.3)
GLUCOSE SERPL-MCNC: 216 MG/DL — HIGH (ref 70–99)
HCT VFR BLD CALC: 40.2 % — SIGNIFICANT CHANGE UP (ref 34.5–45)
HGB BLD-MCNC: 12.9 G/DL — SIGNIFICANT CHANGE UP (ref 11.5–15.5)
HSV+VZV DNA SPEC QL NAA+PROBE: SIGNIFICANT CHANGE UP
MAGNESIUM SERPL-MCNC: 1.9 MG/DL — SIGNIFICANT CHANGE UP (ref 1.6–2.6)
MCHC RBC-ENTMCNC: 28.4 PG — SIGNIFICANT CHANGE UP (ref 27–34)
MCHC RBC-ENTMCNC: 32.1 % — SIGNIFICANT CHANGE UP (ref 32–36)
MCV RBC AUTO: 88.5 FL — SIGNIFICANT CHANGE UP (ref 80–100)
NRBC # FLD: 0 — SIGNIFICANT CHANGE UP
PHOSPHATE SERPL-MCNC: 2.7 MG/DL — SIGNIFICANT CHANGE UP (ref 2.5–4.5)
PLATELET # BLD AUTO: 195 K/UL — SIGNIFICANT CHANGE UP (ref 150–400)
PMV BLD: 10.8 FL — SIGNIFICANT CHANGE UP (ref 7–13)
POTASSIUM SERPL-MCNC: 4.1 MMOL/L — SIGNIFICANT CHANGE UP (ref 3.5–5.3)
POTASSIUM SERPL-SCNC: 4.1 MMOL/L — SIGNIFICANT CHANGE UP (ref 3.5–5.3)
RBC # BLD: 4.54 M/UL — SIGNIFICANT CHANGE UP (ref 3.8–5.2)
RBC # FLD: 13.1 % — SIGNIFICANT CHANGE UP (ref 10.3–14.5)
SODIUM SERPL-SCNC: 144 MMOL/L — SIGNIFICANT CHANGE UP (ref 135–145)
SPECIMEN SOURCE: SIGNIFICANT CHANGE UP
WBC # BLD: 15.6 K/UL — HIGH (ref 3.8–10.5)
WBC # FLD AUTO: 15.6 K/UL — HIGH (ref 3.8–10.5)

## 2017-08-18 PROCEDURE — 99239 HOSP IP/OBS DSCHRG MGMT >30: CPT

## 2017-08-18 RX ORDER — LISINOPRIL 2.5 MG/1
2.5 TABLET ORAL DAILY
Qty: 0 | Refills: 0 | Status: DISCONTINUED | OUTPATIENT
Start: 2017-08-18 | End: 2017-08-18

## 2017-08-18 RX ORDER — MONTELUKAST 4 MG/1
0 TABLET, CHEWABLE ORAL
Qty: 0 | Refills: 0 | COMMUNITY

## 2017-08-18 RX ORDER — ACETAMINOPHEN 500 MG
2 TABLET ORAL
Qty: 30 | Refills: 0 | OUTPATIENT
Start: 2017-08-18

## 2017-08-18 RX ORDER — AMOXICILLIN 250 MG/5ML
15 SUSPENSION, RECONSTITUTED, ORAL (ML) ORAL
Qty: 150 | Refills: 0 | OUTPATIENT
Start: 2017-08-18 | End: 2017-08-23

## 2017-08-18 RX ORDER — LISINOPRIL 2.5 MG/1
1 TABLET ORAL
Qty: 30 | Refills: 0 | OUTPATIENT
Start: 2017-08-18

## 2017-08-18 RX ORDER — PANTOPRAZOLE SODIUM 20 MG/1
1 TABLET, DELAYED RELEASE ORAL
Qty: 30 | Refills: 0 | OUTPATIENT
Start: 2017-08-18 | End: 2017-08-19

## 2017-08-18 RX ADMIN — PANTOPRAZOLE SODIUM 40 MILLIGRAM(S): 20 TABLET, DELAYED RELEASE ORAL at 12:18

## 2017-08-18 RX ADMIN — Medication 2: at 12:19

## 2017-08-18 RX ADMIN — Medication 650 MILLIGRAM(S): at 00:40

## 2017-08-18 RX ADMIN — CEFTRIAXONE 100 GRAM(S): 500 INJECTION, POWDER, FOR SOLUTION INTRAMUSCULAR; INTRAVENOUS at 01:58

## 2017-08-18 RX ADMIN — Medication 100 MILLIGRAM(S): at 06:46

## 2017-08-18 RX ADMIN — Medication 1: at 09:04

## 2017-08-18 RX ADMIN — SODIUM CHLORIDE 125 MILLILITER(S): 9 INJECTION INTRAMUSCULAR; INTRAVENOUS; SUBCUTANEOUS at 09:04

## 2017-08-18 RX ADMIN — SODIUM CHLORIDE 125 MILLILITER(S): 9 INJECTION INTRAMUSCULAR; INTRAVENOUS; SUBCUTANEOUS at 06:46

## 2017-08-18 RX ADMIN — Medication 650 MILLIGRAM(S): at 10:30

## 2017-08-18 RX ADMIN — LISINOPRIL 2.5 MILLIGRAM(S): 2.5 TABLET ORAL at 12:18

## 2017-08-18 RX ADMIN — Medication 650 MILLIGRAM(S): at 11:00

## 2017-08-18 NOTE — PROGRESS NOTE ADULT - ATTENDING COMMENTS
Patient seen and examined.  Case discussed with house staff.  Agree with above as edited.   Patient a/w supraglottitis.  Appreciate ENT f/u.  No further inpatient ENT intervention  Plan for d/c home on PO clinda and amoxicillin (liquid) and medrol pack.  d/w patient  d/w RN  D/C time: 40 minutes

## 2017-08-18 NOTE — PROGRESS NOTE ADULT - PROBLEM SELECTOR PLAN 1
Multiple fiberoptic scopes performed by ENT. Pt afebrile and vitally stable, but now with leukocytosis. Tolerating soft diet.   - continue ceftriaxone 1g Q24H and clindamycin 900mg Q8H for a total of 7 - 10 days depending on response  - Pt saturating well on room air and denies SOB, will escalate to 2L NC if she becomes acutely SOB or saturation drops  - routine vital signs  - trend WBC  - blood Cx  - page ORL stat if any change in respiratory status Multiple fiberoptic scopes performed by ENT. Pt afebrile and vitally stable, but now with leukocytosis. Tolerating soft diet.   - continue ceftriaxone 1g Q24H and clindamycin 900mg Q8H for a total of 7 - 10 days. Will send pt home on amoxicillin and clindamycin upon discharge  - leukocytosis most likely secondary to dexamethasone treatment   - Pt saturating well on room air and denies SOB, will escalate to 2L NC if she becomes acutely SOB or saturation drops  - routine vital signs  - trend WBC  - F/U blood Cx  - page ORL stat if any change in respiratory status

## 2017-08-18 NOTE — PROGRESS NOTE ADULT - PROBLEM SELECTOR PLAN 8
DVT ppx: heparin SubQ  Diet: Clear liquid    D/W Dr. Jessica Kumar, PGY1  Spectra #[id number] DVT ppx: heparin SubQ  Diet: Clear liquid    -DIspo: OK to D/C home on oral antibiotics, ENT in agreement    D/W Dr. Jessica Kumar, PGY1  Spectra #[id number]

## 2017-08-18 NOTE — PROGRESS NOTE ADULT - PROBLEM SELECTOR PLAN 2
Acute onset of odynophagia with CT scan concerning for abscess vs neoplasm. Pt has remote hx of GERD for which she takes no home medications. Symptoms improved s/p 3 doses of dexamethasone 10mg. Continue to monitor for worsening   - ENT following, F/U recs  - Pantoprazole 40mg IV  - d/c IVF since pt taking PO diet

## 2017-08-18 NOTE — DISCHARGE NOTE ADULT - MEDICATION SUMMARY - MEDICATIONS TO TAKE
I will START or STAY ON the medications listed below when I get home from the hospital:    Medrol Dosepak 4 mg oral tablet  -- Day 1 - six tablets   Day 2 - five tablets  Day 3 - four tablets  Day 4 - three tablets  Day 5 - two tablets  Day 6 - one tablet  -- It is very important that you take or use this exactly as directed.  Do not skip doses or discontinue unless directed by your doctor.  Obtain medical advice before taking any non-prescription drugs as some may affect the action of this medication.  Take with food or milk.    -- Indication: For Supraglottitis    acetaminophen 325 mg oral tablet  -- 2 tab(s) by mouth every 6 hours, As needed, pain MDD:4  -- Indication: For Supraglottitis    lisinopril 2.5 mg oral tablet  -- 1 tab(s) by mouth once a day  -- Indication: For Diabetes Mellitus    GABAPENTIN 300 MG CAPSULE  -- Indication: For Neuropathy    Synjardy 5 mg-1000 mg oral tablet  -- 1 tab(s) by mouth once a day  -- Indication: For Diabetes Mellitus    ADVAIR 250-50 DISKUS  -- Indication: For Asthma    PROVENTIL HFA 90 MCG INHALER  -- Indication: For Asthma    clindamycin 75 mg/5 mL oral liquid  -- 40 milliliter(s) by mouth every 8 hours  -- Expires___________________  Finish all this medication unless otherwise directed by prescriber.  Medication should be taken with plenty of water.  Shake well before use.    -- Indication: For Supraglottitis    amoxicillin 200 mg/5 mL oral liquid  -- 15 milliliter(s) by mouth every 12 hours  -- Expires___________________  Finish all this medication unless otherwise directed by prescriber.  Refrigerate and shake well.  Expires_______________________    -- Indication: For Supraglottitis    pantoprazole 40 mg oral delayed release tablet  -- 1 tab(s) by mouth once a day  -- It is very important that you take or use this exactly as directed.  Do not skip doses or discontinue unless directed by your doctor.  Obtain medical advice before taking any non-prescription drugs as some may affect the action of this medication.  Swallow whole.  Do not crush.    -- Indication: For Supraglottitis

## 2017-08-18 NOTE — PROGRESS NOTE ADULT - SUBJECTIVE AND OBJECTIVE BOX
EPIFANIO CARLTON  57y  Female      Patient is a 57y old  Female who presents with a chief complaint of Worsening dysphagia and odynophagia (17 Aug 2017 14:05)      INTERVAL HPI/OVERNIGHT EVENTS: Pt requested a soft diet yesterday which she tolerated except for some sore throat, most likely secondary to repeated scopes. This AM she denies F/C/N/V, CP, SOB, stridor, abd pain, dysuria. She states her throat is intermittently sore/dry but improves with water.     T(C): 36.5 (08-18-17 @ 06:44), Max: 36.8 (08-17-17 @ 09:10)  HR: 60 (08-18-17 @ 06:44) (60 - 67)  BP: 126/75 (08-18-17 @ 06:44) (118/65 - 126/75)  RR: 17 (08-18-17 @ 06:44) (17 - 18)  SpO2: 99% (08-18-17 @ 06:44) (96% - 100%)  Wt(kg): --Vital Signs Last 24 Hrs  T(C): 36.5 (18 Aug 2017 06:44), Max: 36.8 (17 Aug 2017 09:10)  T(F): 97.7 (18 Aug 2017 06:44), Max: 98.2 (17 Aug 2017 09:10)  HR: 60 (18 Aug 2017 06:44) (60 - 67)  BP: 126/75 (18 Aug 2017 06:44) (118/65 - 126/75)  BP(mean): --  RR: 17 (18 Aug 2017 06:44) (17 - 18)  SpO2: 99% (18 Aug 2017 06:44) (96% - 100%)  Wt(kg): --    PHYSICAL EXAM:  GENERAL: NAD, well-groomed, well-developed  HEAD:  Atraumatic, Normocephalic  EYES: EOMI, PERRLA, conjunctiva and sclera clear  ENMT: Vesicular lesions with erythematous base located on right lip and tongue, non purulent. Moist mucous membranes.   NECK: Supple, No JVD, Normal thyroid  CHEST/LUNG: Clear to percussion bilaterally; No rales, rhonchi, wheezing, or rubs  HEART: Regular rate and rhythm; No murmurs, rubs, or gallops  ABDOMEN: Soft, Nontender, Nondistended; Bowel sounds present.  No hepatosplenomegaly  EXTREMITIES:  2+ Peripheral Pulses, No clubbing, cyanosis, or edema  LYMPH: No lymphadenopathy noted  SKIN: No rashes or lesions  PSYCH: Alert & Oriented x3    Consultant(s) Notes Reviewed:  [x ] YES  [ ] NO  Care Discussed with Consultants/Other Providers [ x] YES  [ ] NO    LABS:                        12.9   15.60 )-----------( 195      ( 18 Aug 2017 06:00 )             40.2     08-18    144  |  106  |  8   ----------------------------<  216<H>  4.1   |  23  |  0.62    Ca    9.0      18 Aug 2017 06:00  Phos  2.7     08-18  Mg     1.9     08-18    TPro  7.7  /  Alb  4.3  /  TBili  1.1  /  DBili  x   /  AST  35<H>  /  ALT  30  /  AlkPhos  84  08-16        CAPILLARY BLOOD GLUCOSE  227 (17 Aug 2017 22:35)  190 (17 Aug 2017 17:24)  203 (17 Aug 2017 12:31)

## 2017-08-18 NOTE — PROGRESS NOTE ADULT - PROBLEM SELECTOR PLAN 3
HgA1C was 6.7 on day prior to admission, pt on one oral agent at home. Blood glucose may increase due to steroids. Spoke with PMD yesterday who confirmed pt is prescribed lisinopril 10mg for kidney protection, but pt states noncompliance.  - ISS  - FS testing with meals and at bedtime HgA1C was 6.7 on day prior to admission, pt on one oral agent at home. Blood glucose may increase due to steroids. Spoke with PMD yesterday who confirmed pt is prescribed lisinopril 10mg for kidney protection, but pt states noncompliance.  - ISS  - FS testing with meals and at bedtime  - restart lisinopril 2.5mg

## 2017-08-18 NOTE — DISCHARGE NOTE ADULT - CARE PROVIDER_API CALL
Isabelle Malhotra (ARUNA), Family Medicine  [address]  [address] [place] [address]  Phone: [phone]  Fax: [phone]

## 2017-08-18 NOTE — PROGRESS NOTE ADULT - ASSESSMENT
57F with PMHx of DMII, asthma, uterine cancer dx in 1999 s/p ALY-BSO and RTX, hyperlipidemia off medication, and TIA p/w acute onset of dysphagia concerning for supraglottitis or abscess.

## 2017-08-18 NOTE — DISCHARGE NOTE ADULT - PLAN OF CARE
Resolution of Infection In the hospital you were found to have and infection in your throat causing inflammation and swelling. You were given antibiotics to treat this infection. Please come to the emergency room if you experience a worsening of your symptoms. Please follow-up with your primary care doctor within one week of discharge. You have a history of diabetes. Please continue taking your diabetic medication upon discharge as prescribed. You have a history of asthma. Please continue taking your home inhalers upon discharge as prescribed. You have a history of TIA. Please resume taking 81mg aspirin every day upon discharge. In the hospital you were found to have and infection in your throat causing inflammation and swelling. You were given antibiotics to treat this infection and steroid medication to alleviate your symptoms. Please continue antibiotics for five more days after discharge and continue taking steroids as prescribed. Return to the emergency room if you experience a worsening of your symptoms. Please follow-up with your primary care doctor within one week of discharge.

## 2017-08-18 NOTE — DISCHARGE NOTE ADULT - CARE PLAN
Principal Discharge DX:	Supraglottitis  Goal:	Resolution of Infection  Instructions for follow-up, activity and diet:	In the hospital you were found to have and infection in your throat causing inflammation and swelling. You were given antibiotics to treat this infection. Please come to the emergency room if you experience a worsening of your symptoms. Please follow-up with your primary care doctor within one week of discharge.  Secondary Diagnosis:	Diabetes Mellitus  Instructions for follow-up, activity and diet:	You have a history of diabetes. Please continue taking your diabetic medication upon discharge as prescribed.  Secondary Diagnosis:	Asthma  Instructions for follow-up, activity and diet:	You have a history of asthma. Please continue taking your home inhalers upon discharge as prescribed. Principal Discharge DX:	Supraglottitis  Goal:	Resolution of Infection  Instructions for follow-up, activity and diet:	In the hospital you were found to have and infection in your throat causing inflammation and swelling. You were given antibiotics to treat this infection and steroid medication to alleviate your symptoms. Please continue antibiotics for five more days after discharge and continue taking steroids as prescribed. Return to the emergency room if you experience a worsening of your symptoms. Please follow-up with your primary care doctor within one week of discharge.  Secondary Diagnosis:	Diabetes Mellitus  Instructions for follow-up, activity and diet:	You have a history of diabetes. Please continue taking your diabetic medication upon discharge as prescribed.  Secondary Diagnosis:	Asthma  Instructions for follow-up, activity and diet:	You have a history of asthma. Please continue taking your home inhalers upon discharge as prescribed.  Secondary Diagnosis:	Transient ischemic attack  Instructions for follow-up, activity and diet:	You have a history of TIA. Please resume taking 81mg aspirin every day upon discharge.

## 2017-08-18 NOTE — DISCHARGE NOTE ADULT - HOSPITAL COURSE
Pt is a 57F with PMHx of uterine cancer (dx in 1999 s/p ALY-BSO and RTX), DM, asthma, GERD, hyperlipidemia (not on medication), TIA (2014) who presented to ED on 8/16 with worsening dysphagia and sore throat for a "couple days." Over this time the patient became unable to eat solid foods, but did not have difficulty breathing. She went to an urgent care center who sent the patient to the ED for further evaluation. In the ED she denied F/C/N/V, CP, SOB, drooling, stridor. ENT evaluated the patient and ordered a CT which showed cystic focus centered in right aryepiglotitic fold with fold thickening, local mass effect, and cervical adenopathy, concerning for early abscess formation, focal edema, or cystic neoplasm. After a fiberoptic scope, she was dx with supraglottitis with concern for possible early abscess formation and admitted to the CDU for IV antibiotics and steroids to decrease the swelling. Overnight, she desaturated to 92% on room air, requiring supplemental O2, developed hoarseness of voice, and wasn't able to tolerate PO. After re-evaluation by ENT in AM on 8/17, it was determined that pt's clinical status was not improving so she was admitted to the general medicine floor for further inpatient management.    At the time of admission the patient stated she "feels better" than when she came to the ED and was able to tolerate soft foods like jello. Her voice was less hoarse. She reported her throat was still sore, but she denies F/C/N/V, CP, SOB, or abdominal pain. (17 Aug 2017 14:05)    Antibiotics were continued for supraglottitis and dexamethasone 10mg for three doses was given for symptomatic management. She was started on a soft diet on day of admission, which she tolerated well. On admission she was also found to have vesicular lesions in her mouth. A HSV culture was sent which was negative. Pt remained vitally stable with improvement in symptoms so she was discharged on a course of antibiotics with instruction to follow-up with her PCP within one week of discharge. Pt is a 57F with PMHx of uterine cancer (dx in 1999 s/p ALY-BSO and RTX), DM, asthma, GERD, hyperlipidemia (not on medication), TIA (2014) who presented to ED on 8/16 with worsening dysphagia and sore throat for a "couple days." Over this time the patient became unable to eat solid foods, but did not have difficulty breathing. She went to an urgent care center who sent the patient to the ED for further evaluation. In the ED she denied F/C/N/V, CP, SOB, drooling, stridor. ENT evaluated the patient and ordered a CT which showed cystic focus centered in right aryepiglotitic fold with fold thickening, local mass effect, and cervical adenopathy, concerning for early abscess formation, focal edema, or cystic neoplasm. After a fiberoptic scope, she was dx with supraglottitis with concern for possible early abscess formation and admitted to the CDU for IV antibiotics and steroids to decrease the swelling. Overnight, she desaturated to 92% on room air, requiring supplemental O2, developed hoarseness of voice, and wasn't able to tolerate PO. After re-evaluation by ENT in AM on 8/17, it was determined that pt's clinical status was not improving so she was admitted to the general medicine floor for further inpatient management.    At the time of admission the patient stated she "feels better" than when she came to the ED and was able to tolerate soft foods like jello. Her voice was less hoarse. She reported her throat was still sore, but she denies F/C/N/V, CP, SOB, or abdominal pain. (17 Aug 2017 14:05)    Antibiotics were continued for supraglottitis and dexamethasone 10mg for three doses was given for symptomatic management. She was started on a soft diet on day of admission, which she tolerated well. On admission she was also found to have vesicular lesions in her mouth. A HSV culture of the lesion was sent. Pt remained vitally stable overnight with improvement in symptoms. ENT evaluated the pt on the morning of 8/18 and documented clinical improvement from previous exams and felt she was ready to go home. She was determined to be medically stable for discharge and left the hospital on a 7 day course of antibiotics and Medrol Dosepak with instruction to follow-up with her PCP within one week of discharge.

## 2017-08-18 NOTE — DISCHARGE NOTE ADULT - PATIENT PORTAL LINK FT
“You can access the Follow[facility] Patient Portal, offered by [facility], by registering with the following website: [email or web address]”

## 2017-08-18 NOTE — PROGRESS NOTE ADULT - SUBJECTIVE AND OBJECTIVE BOX
Patient seen and examined.    Feels significantly better, ~95% of normal. Has been tolerating PO. Does not want to be scoped at this time given how improved she feels.    NAD, alert, awake, walking in room  On RA, no stridor no stertor  Voice much improved    A/P  57F w/ likely supraglottitis which is significantly improved.  -no acute ORL intervention  -PO abx and medrol dose pack per medical team  -continue ppi  -dispo plan per medical team

## 2017-08-18 NOTE — PROGRESS NOTE ADULT - PROBLEM SELECTOR PLAN 7
Pt now with vesicles with erythematous base on base of uvula and lateral side of tongue, most likely from barotrauma from multiple fiberoptic scopes. Swab of vesicle sent to r/o herpes infection.  - f/u HSV culture

## 2017-08-18 NOTE — DISCHARGE NOTE ADULT - ADDITIONAL INSTRUCTIONS
- Follow-up with PCP within one week of discharge  - Continue antibiotics for 5 more days  - Continue Medrol Dosepak  - Continue diabetes medication  - Continue Aspirin 81mg - Follow-up with PCP within one week of discharge  - Continue antibiotics for 5 more days  - Continue Medrol Dosepak  ----------- Medrol dosepak insturctions will be given to you by pharmacy. In simple terms, you will get total of 21 tablets.  You will take 6 tablets day, 5 tablets day 2, and decrease by one tablet each day. On day 6, you will take your 1 last tablet.   - Continue diabetes medication  - Continue Aspirin 81mg

## 2017-08-18 NOTE — PROGRESS NOTE ADULT - PROBLEM SELECTOR PLAN 5
TIA in 2014, pt supposed to be taking aspirin 81mg per PMD, but is not compliant with medication.   - hold ASA 81mg in case urgent intervention is needed TIA in 2014, pt supposed to be taking aspirin 81mg per PMD, but is not compliant with medication.   - restart home aspirin 81mg

## 2017-08-22 PROBLEM — Z87.09 HISTORY OF EPIGLOTTITIS: Status: RESOLVED | Noted: 2017-08-22 | Resolved: 2017-08-22

## 2017-08-22 LAB
BACTERIA BLD CULT: SIGNIFICANT CHANGE UP
BACTERIA BLD CULT: SIGNIFICANT CHANGE UP

## 2017-08-24 ENCOUNTER — EMERGENCY (EMERGENCY)
Facility: HOSPITAL | Age: 57
LOS: 1 days | Discharge: HOME | End: 2017-08-24
Attending: EMERGENCY MEDICINE | Admitting: EMERGENCY MEDICINE
Payer: COMMERCIAL

## 2017-08-24 VITALS
DIASTOLIC BLOOD PRESSURE: 90 MMHG | HEART RATE: 66 BPM | SYSTOLIC BLOOD PRESSURE: 159 MMHG | RESPIRATION RATE: 16 BRPM | OXYGEN SATURATION: 97 % | TEMPERATURE: 99 F

## 2017-08-24 VITALS
OXYGEN SATURATION: 100 % | RESPIRATION RATE: 17 BRPM | SYSTOLIC BLOOD PRESSURE: 137 MMHG | DIASTOLIC BLOOD PRESSURE: 83 MMHG | TEMPERATURE: 98 F | HEART RATE: 64 BPM

## 2017-08-24 DIAGNOSIS — Z90.710 ACQUIRED ABSENCE OF BOTH CERVIX AND UTERUS: Chronic | ICD-10-CM

## 2017-08-24 LAB
ALBUMIN SERPL ELPH-MCNC: 4.4 G/DL — SIGNIFICANT CHANGE UP (ref 3.3–5)
ALP SERPL-CCNC: 97 U/L — SIGNIFICANT CHANGE UP (ref 40–120)
ALT FLD-CCNC: 32 U/L RC — SIGNIFICANT CHANGE UP (ref 10–45)
ANION GAP SERPL CALC-SCNC: 19 MMOL/L — HIGH (ref 5–17)
APPEARANCE UR: CLEAR — SIGNIFICANT CHANGE UP
AST SERPL-CCNC: 20 U/L — SIGNIFICANT CHANGE UP (ref 10–40)
BASOPHILS # BLD AUTO: 0.1 K/UL — SIGNIFICANT CHANGE UP (ref 0–0.2)
BASOPHILS NFR BLD AUTO: 0.6 % — SIGNIFICANT CHANGE UP (ref 0–2)
BILIRUB SERPL-MCNC: 0.8 MG/DL — SIGNIFICANT CHANGE UP (ref 0.2–1.2)
BILIRUB UR-MCNC: NEGATIVE — SIGNIFICANT CHANGE UP
BUN SERPL-MCNC: 15 MG/DL — SIGNIFICANT CHANGE UP (ref 7–23)
CALCIUM SERPL-MCNC: 9.6 MG/DL — SIGNIFICANT CHANGE UP (ref 8.4–10.5)
CHLORIDE SERPL-SCNC: 97 MMOL/L — SIGNIFICANT CHANGE UP (ref 96–108)
CO2 SERPL-SCNC: 21 MMOL/L — LOW (ref 22–31)
COLOR SPEC: SIGNIFICANT CHANGE UP
CREAT SERPL-MCNC: 0.88 MG/DL — SIGNIFICANT CHANGE UP (ref 0.5–1.3)
DIFF PNL FLD: NEGATIVE — SIGNIFICANT CHANGE UP
EOSINOPHIL # BLD AUTO: 0.5 K/UL — SIGNIFICANT CHANGE UP (ref 0–0.5)
EOSINOPHIL NFR BLD AUTO: 2.8 % — SIGNIFICANT CHANGE UP (ref 0–6)
EPI CELLS # UR: SIGNIFICANT CHANGE UP /HPF
GLUCOSE SERPL-MCNC: 142 MG/DL — HIGH (ref 70–99)
GLUCOSE UR QL: >1000
HCT VFR BLD CALC: 44.6 % — SIGNIFICANT CHANGE UP (ref 34.5–45)
HGB BLD-MCNC: 14.9 G/DL — SIGNIFICANT CHANGE UP (ref 11.5–15.5)
KETONES UR-MCNC: NEGATIVE — SIGNIFICANT CHANGE UP
LEUKOCYTE ESTERASE UR-ACNC: NEGATIVE — SIGNIFICANT CHANGE UP
LYMPHOCYTES # BLD AUTO: 34.6 % — SIGNIFICANT CHANGE UP (ref 13–44)
LYMPHOCYTES # BLD AUTO: 5.9 K/UL — HIGH (ref 1–3.3)
MCHC RBC-ENTMCNC: 30.8 PG — SIGNIFICANT CHANGE UP (ref 27–34)
MCHC RBC-ENTMCNC: 33.4 GM/DL — SIGNIFICANT CHANGE UP (ref 32–36)
MCV RBC AUTO: 92.1 FL — SIGNIFICANT CHANGE UP (ref 80–100)
MONOCYTES # BLD AUTO: 1.6 K/UL — HIGH (ref 0–0.9)
MONOCYTES NFR BLD AUTO: 9.4 % — SIGNIFICANT CHANGE UP (ref 2–14)
NEUTROPHILS # BLD AUTO: 9 K/UL — HIGH (ref 1.8–7.4)
NEUTROPHILS NFR BLD AUTO: 52.6 % — SIGNIFICANT CHANGE UP (ref 43–77)
NITRITE UR-MCNC: NEGATIVE — SIGNIFICANT CHANGE UP
PH UR: 6.5 — SIGNIFICANT CHANGE UP (ref 5–8)
PLAT MORPH BLD: NORMAL — SIGNIFICANT CHANGE UP
PLATELET # BLD AUTO: 247 K/UL — SIGNIFICANT CHANGE UP (ref 150–400)
POTASSIUM SERPL-MCNC: 3.9 MMOL/L — SIGNIFICANT CHANGE UP (ref 3.5–5.3)
POTASSIUM SERPL-SCNC: 3.9 MMOL/L — SIGNIFICANT CHANGE UP (ref 3.5–5.3)
PROT SERPL-MCNC: 7.8 G/DL — SIGNIFICANT CHANGE UP (ref 6–8.3)
PROT UR-MCNC: 30 MG/DL
RBC # BLD: 4.85 M/UL — SIGNIFICANT CHANGE UP (ref 3.8–5.2)
RBC # FLD: 12.2 % — SIGNIFICANT CHANGE UP (ref 10.3–14.5)
RBC BLD AUTO: NORMAL — SIGNIFICANT CHANGE UP
RBC CASTS # UR COMP ASSIST: SIGNIFICANT CHANGE UP /HPF (ref 0–2)
SODIUM SERPL-SCNC: 137 MMOL/L — SIGNIFICANT CHANGE UP (ref 135–145)
SP GR SPEC: >1.03 — HIGH (ref 1.01–1.02)
UROBILINOGEN FLD QL: NEGATIVE — SIGNIFICANT CHANGE UP
WBC # BLD: 17.1 K/UL — HIGH (ref 3.8–10.5)
WBC # FLD AUTO: 17.1 K/UL — HIGH (ref 3.8–10.5)
WBC UR QL: SIGNIFICANT CHANGE UP /HPF (ref 0–5)

## 2017-08-24 PROCEDURE — 81001 URINALYSIS AUTO W/SCOPE: CPT

## 2017-08-24 PROCEDURE — 99284 EMERGENCY DEPT VISIT MOD MDM: CPT | Mod: 25

## 2017-08-24 PROCEDURE — 85027 COMPLETE CBC AUTOMATED: CPT

## 2017-08-24 PROCEDURE — 99285 EMERGENCY DEPT VISIT HI MDM: CPT

## 2017-08-24 PROCEDURE — 74177 CT ABD & PELVIS W/CONTRAST: CPT | Mod: 26

## 2017-08-24 PROCEDURE — 96374 THER/PROPH/DIAG INJ IV PUSH: CPT | Mod: XU

## 2017-08-24 PROCEDURE — 87086 URINE CULTURE/COLONY COUNT: CPT

## 2017-08-24 PROCEDURE — 80053 COMPREHEN METABOLIC PANEL: CPT

## 2017-08-24 PROCEDURE — 74177 CT ABD & PELVIS W/CONTRAST: CPT

## 2017-08-24 RX ORDER — KETOROLAC TROMETHAMINE 30 MG/ML
15 SYRINGE (ML) INJECTION ONCE
Qty: 0 | Refills: 0 | Status: DISCONTINUED | OUTPATIENT
Start: 2017-08-24 | End: 2017-08-24

## 2017-08-24 RX ORDER — SODIUM CHLORIDE 9 MG/ML
2000 INJECTION INTRAMUSCULAR; INTRAVENOUS; SUBCUTANEOUS ONCE
Qty: 0 | Refills: 0 | Status: COMPLETED | OUTPATIENT
Start: 2017-08-24 | End: 2017-08-24

## 2017-08-24 RX ADMIN — Medication 15 MILLIGRAM(S): at 16:20

## 2017-08-24 RX ADMIN — SODIUM CHLORIDE 2000 MILLILITER(S): 9 INJECTION INTRAMUSCULAR; INTRAVENOUS; SUBCUTANEOUS at 16:20

## 2017-08-24 RX ADMIN — Medication 15 MILLIGRAM(S): at 16:21

## 2017-08-24 NOTE — ED ADULT NURSE NOTE - OBJECTIVE STATEMENT
57y female pt arrived to ED c/o LLQ abd pain started this morning. Pt states that she also had few episodes of diarrhea yesterday and today and took Imodium with some relief. No fever/chills, no nausea/vomit, no chest pain or sob, lungs are CTA, abd nondistended, tender on LLQ, denies any urinary deficits. Skins are warm, dry and intact.

## 2017-08-24 NOTE — ED PROVIDER NOTE - OBJECTIVE STATEMENT
58 y/o F w/ hx of DM, HTN, hysterectomy, abdominal hernia, and chronic diarrhea, p/w acute left lower quadrant abdom pain x 1 day. Recently diagnosed w/ epiglottitis, given course of amoxicillin and clindamycin, throat pain improved, but has had more frequent episodes of diarrhea x last 2 days (3-4 episodes); has been taking immodium. No cough, fever, chills, n/v.

## 2017-08-24 NOTE — ED ADULT NURSE REASSESSMENT NOTE - NS ED NURSE REASSESS COMMENT FT1
received report from MAUREEN Price. patient resting comfortably in bed in no acute distress. patient denies pain at this time. pending urine results. VSS.

## 2017-08-24 NOTE — ED PROVIDER NOTE - PHYSICAL EXAMINATION
Gen: NAD  Eyes:  sclerae white, no icterus  ENT: Moist mucous membranes. No exudates  Neck: supple, no LAD, mass or goiter, trachea midline  CV: RRR. Audible S1 and S2. No murmurs, rubs, gallops, S3, nor S4  Pulm: Clear to auscultation bilaterally. No wheezes, rales, or rhonchi  Abd: Soft, nondistended, left lower quadrant TTP  Musculoskeletal:  No edema  Skin: no lesions or scars noted  Psych: mood good, affect full range and congruent with mood.  Neurologic: AAOx3

## 2017-08-24 NOTE — ED PROVIDER NOTE - ATTENDING CONTRIBUTION TO CARE
Attending Note (Lolis): patient complaining of llq pain.  llq tenderness. recent abx use.  less concerned for c-diff, diarrhea only 3-4 eps per day.  non toxic appearing.  will perform ct.

## 2017-08-24 NOTE — ED ADULT NURSE NOTE - CHPI ED SYMPTOMS NEG
no hematuria/no chills/no vomiting/no fever/no nausea/no abdominal distension/no dysuria/no burning urination

## 2017-08-24 NOTE — ED PROVIDER NOTE - MEDICAL DECISION MAKING DETAILS
Acute left lower quadrant abdom pain x 1 day w/ assoc diarrhea, 3-4 episodes today after taking abx. PE: Well appearing, acute left lower quadrant abdom pain. Diverticulitis vs AGE vs less likely c. dif given well appearance and few episodes of diarrhea.

## 2017-08-25 LAB
CULTURE RESULTS: SIGNIFICANT CHANGE UP
SPECIMEN SOURCE: SIGNIFICANT CHANGE UP

## 2017-09-13 ENCOUNTER — APPOINTMENT (OUTPATIENT)
Dept: OBGYN | Facility: CLINIC | Age: 57
End: 2017-09-13
Payer: COMMERCIAL

## 2017-09-13 VITALS
BODY MASS INDEX: 31.82 KG/M2 | DIASTOLIC BLOOD PRESSURE: 66 MMHG | HEIGHT: 65 IN | WEIGHT: 191 LBS | SYSTOLIC BLOOD PRESSURE: 127 MMHG

## 2017-09-13 DIAGNOSIS — Z87.09 PERSONAL HISTORY OF OTHER DISEASES OF THE RESPIRATORY SYSTEM: ICD-10-CM

## 2017-09-13 DIAGNOSIS — Z00.00 ENCOUNTER FOR GENERAL ADULT MEDICAL EXAMINATION W/OUT ABNORMAL FINDINGS: ICD-10-CM

## 2017-09-13 PROCEDURE — 99396 PREV VISIT EST AGE 40-64: CPT

## 2017-09-13 RX ORDER — AMOXICILLIN AND CLAVULANATE POTASSIUM 875; 125 MG/1; MG/1
875-125 TABLET, COATED ORAL
Qty: 20 | Refills: 0 | Status: DISCONTINUED | COMMUNITY
Start: 2017-08-15 | End: 2017-09-13

## 2018-06-26 ENCOUNTER — EMERGENCY (EMERGENCY)
Facility: HOSPITAL | Age: 58
LOS: 1 days | Discharge: HOME | End: 2018-06-26
Attending: EMERGENCY MEDICINE
Payer: COMMERCIAL

## 2018-06-26 VITALS
OXYGEN SATURATION: 100 % | HEART RATE: 62 BPM | WEIGHT: 195.11 LBS | DIASTOLIC BLOOD PRESSURE: 98 MMHG | RESPIRATION RATE: 20 BRPM | TEMPERATURE: 98 F | SYSTOLIC BLOOD PRESSURE: 138 MMHG

## 2018-06-26 DIAGNOSIS — Z90.710 ACQUIRED ABSENCE OF BOTH CERVIX AND UTERUS: Chronic | ICD-10-CM

## 2018-06-26 LAB
ALBUMIN SERPL ELPH-MCNC: 4.5 G/DL — SIGNIFICANT CHANGE UP (ref 3.3–5)
ALP SERPL-CCNC: 94 U/L — SIGNIFICANT CHANGE UP (ref 40–120)
ALT FLD-CCNC: 31 U/L — SIGNIFICANT CHANGE UP (ref 10–45)
ANION GAP SERPL CALC-SCNC: 12 MMOL/L — SIGNIFICANT CHANGE UP (ref 5–17)
APPEARANCE UR: CLEAR — SIGNIFICANT CHANGE UP
APTT BLD: 28.5 SEC — SIGNIFICANT CHANGE UP (ref 27.5–37.4)
AST SERPL-CCNC: 26 U/L — SIGNIFICANT CHANGE UP (ref 10–40)
BASOPHILS # BLD AUTO: 0.1 K/UL — SIGNIFICANT CHANGE UP (ref 0–0.2)
BASOPHILS NFR BLD AUTO: 0.9 % — SIGNIFICANT CHANGE UP (ref 0–2)
BILIRUB SERPL-MCNC: 0.6 MG/DL — SIGNIFICANT CHANGE UP (ref 0.2–1.2)
BILIRUB UR-MCNC: NEGATIVE — SIGNIFICANT CHANGE UP
BUN SERPL-MCNC: 13 MG/DL — SIGNIFICANT CHANGE UP (ref 7–23)
CALCIUM SERPL-MCNC: 9.5 MG/DL — SIGNIFICANT CHANGE UP (ref 8.4–10.5)
CHLORIDE SERPL-SCNC: 100 MMOL/L — SIGNIFICANT CHANGE UP (ref 96–108)
CO2 SERPL-SCNC: 29 MMOL/L — SIGNIFICANT CHANGE UP (ref 22–31)
COLOR SPEC: SIGNIFICANT CHANGE UP
CREAT SERPL-MCNC: 0.75 MG/DL — SIGNIFICANT CHANGE UP (ref 0.5–1.3)
DIFF PNL FLD: NEGATIVE — SIGNIFICANT CHANGE UP
EOSINOPHIL # BLD AUTO: 0.5 K/UL — SIGNIFICANT CHANGE UP (ref 0–0.5)
EOSINOPHIL NFR BLD AUTO: 6.2 % — HIGH (ref 0–6)
EPI CELLS # UR: SIGNIFICANT CHANGE UP /HPF
GLUCOSE SERPL-MCNC: 127 MG/DL — HIGH (ref 70–99)
GLUCOSE UR QL: 300 MG/DL
HCT VFR BLD CALC: 42 % — SIGNIFICANT CHANGE UP (ref 34.5–45)
HGB BLD-MCNC: 13.7 G/DL — SIGNIFICANT CHANGE UP (ref 11.5–15.5)
INR BLD: 0.94 RATIO — SIGNIFICANT CHANGE UP (ref 0.88–1.16)
KETONES UR-MCNC: NEGATIVE — SIGNIFICANT CHANGE UP
LEUKOCYTE ESTERASE UR-ACNC: NEGATIVE — SIGNIFICANT CHANGE UP
LYMPHOCYTES # BLD AUTO: 4 K/UL — HIGH (ref 1–3.3)
LYMPHOCYTES # BLD AUTO: 45.7 % — HIGH (ref 13–44)
MCHC RBC-ENTMCNC: 29.6 PG — SIGNIFICANT CHANGE UP (ref 27–34)
MCHC RBC-ENTMCNC: 32.6 GM/DL — SIGNIFICANT CHANGE UP (ref 32–36)
MCV RBC AUTO: 90.9 FL — SIGNIFICANT CHANGE UP (ref 80–100)
MONOCYTES # BLD AUTO: 0.8 K/UL — SIGNIFICANT CHANGE UP (ref 0–0.9)
MONOCYTES NFR BLD AUTO: 9.5 % — SIGNIFICANT CHANGE UP (ref 2–14)
NEUTROPHILS # BLD AUTO: 3.3 K/UL — SIGNIFICANT CHANGE UP (ref 1.8–7.4)
NEUTROPHILS NFR BLD AUTO: 37.6 % — LOW (ref 43–77)
NITRITE UR-MCNC: NEGATIVE — SIGNIFICANT CHANGE UP
PH UR: 6 — SIGNIFICANT CHANGE UP (ref 5–8)
PLATELET # BLD AUTO: 198 K/UL — SIGNIFICANT CHANGE UP (ref 150–400)
POTASSIUM SERPL-MCNC: 4 MMOL/L — SIGNIFICANT CHANGE UP (ref 3.5–5.3)
POTASSIUM SERPL-SCNC: 4 MMOL/L — SIGNIFICANT CHANGE UP (ref 3.5–5.3)
PROT SERPL-MCNC: 7.6 G/DL — SIGNIFICANT CHANGE UP (ref 6–8.3)
PROT UR-MCNC: NEGATIVE — SIGNIFICANT CHANGE UP
PROTHROM AB SERPL-ACNC: 10.2 SEC — SIGNIFICANT CHANGE UP (ref 9.8–12.7)
RBC # BLD: 4.62 M/UL — SIGNIFICANT CHANGE UP (ref 3.8–5.2)
RBC # FLD: 12 % — SIGNIFICANT CHANGE UP (ref 10.3–14.5)
SODIUM SERPL-SCNC: 141 MMOL/L — SIGNIFICANT CHANGE UP (ref 135–145)
SP GR SPEC: 1.01 — LOW (ref 1.01–1.02)
UROBILINOGEN FLD QL: NEGATIVE — SIGNIFICANT CHANGE UP
WBC # BLD: 8.7 K/UL — SIGNIFICANT CHANGE UP (ref 3.8–10.5)
WBC # FLD AUTO: 8.7 K/UL — SIGNIFICANT CHANGE UP (ref 3.8–10.5)

## 2018-06-26 PROCEDURE — 87086 URINE CULTURE/COLONY COUNT: CPT

## 2018-06-26 PROCEDURE — 74177 CT ABD & PELVIS W/CONTRAST: CPT

## 2018-06-26 PROCEDURE — 99285 EMERGENCY DEPT VISIT HI MDM: CPT

## 2018-06-26 PROCEDURE — 85730 THROMBOPLASTIN TIME PARTIAL: CPT

## 2018-06-26 PROCEDURE — 80053 COMPREHEN METABOLIC PANEL: CPT

## 2018-06-26 PROCEDURE — 74177 CT ABD & PELVIS W/CONTRAST: CPT | Mod: 26

## 2018-06-26 PROCEDURE — 96374 THER/PROPH/DIAG INJ IV PUSH: CPT | Mod: XU

## 2018-06-26 PROCEDURE — 85027 COMPLETE CBC AUTOMATED: CPT

## 2018-06-26 PROCEDURE — 85610 PROTHROMBIN TIME: CPT

## 2018-06-26 PROCEDURE — 81001 URINALYSIS AUTO W/SCOPE: CPT

## 2018-06-26 PROCEDURE — 99284 EMERGENCY DEPT VISIT MOD MDM: CPT | Mod: 25

## 2018-06-26 RX ORDER — MORPHINE SULFATE 50 MG/1
4 CAPSULE, EXTENDED RELEASE ORAL ONCE
Qty: 0 | Refills: 0 | Status: DISCONTINUED | OUTPATIENT
Start: 2018-06-26 | End: 2018-06-26

## 2018-06-26 RX ORDER — ONDANSETRON 8 MG/1
4 TABLET, FILM COATED ORAL ONCE
Qty: 0 | Refills: 0 | Status: COMPLETED | OUTPATIENT
Start: 2018-06-26 | End: 2018-06-26

## 2018-06-26 RX ADMIN — MORPHINE SULFATE 4 MILLIGRAM(S): 50 CAPSULE, EXTENDED RELEASE ORAL at 22:09

## 2018-06-26 NOTE — ED PROVIDER NOTE - PLAN OF CARE
Follow up with Dr. Junior in the next 2-3 days.  Follow up our surgeons if you wish, you may call [phone] and arrange a follow up appointment.   Follow up with your medical doctor in 2-3 days or call our clinic at [phone] and state you were seen in the Emergency Department and would like to be seen in clinic. You may also call [phone] to speak with a representative to assist follow up care with medicine, surgery, or specialists.    Take Tylenol 1 g every six hours and supplement (if allowed by your physician) with ibuprofen 600 mg, with food, every six hours which can be taken three hours apart from the Tylenol to have a layered effect.    Drink at least 2 Liters or 64 Ounces of water each day (UNLESS you are supposed to restrict fluids or have a history of congestive heart failure (CHF)).    Return for any persistent, worsening symptoms, or ANY concerns at all.

## 2018-06-26 NOTE — ED PROVIDER NOTE - ATTENDING CONTRIBUTION TO CARE
Patient with chief complaint of abdominal pain x 1 day. 1 day. Persistent. History of uterine ca with radiation therapy. + loose bowel movements but without fever.  GEN: mild distress secondary to pain, awake, eyes open spontaneously, obese by bmi  HEENT: NCAT, MMM, Trachea midline, normal conjunctiva, perrl  CHEST/LUNGS: Non-tachypneic, CTAB, bilateral breath sounds  CARDIAC: Non-tachycardic, normal perfusion  ABDOMEN: Soft, mild suprapubic tenderness to palpation and left lower quadrant pain, No rebound/guarding  MSK: No edema, no gross deformity of extremities  SKIN: No rashes, no petechiae, no vesicles  NEURO: CN grossly intact, normal coordination, no focal motor or sensory deficits  PSYCH: Alert, appropriate, cooperative, with capacity and insight   Patient with chief complaint of lower abdominal pain consistent with ? diverticulitis vs colitis vs less likely uti  will get iv, labs, fluids, ct a/p with iv/po contrast and reassess  Will follow up on labs, analgesia, reassess and disposition as clinically indicated. Patient with chief complaint of abdominal pain x 1 day. 1 day. Persistent. History of uterine ca with radiation therapy. + loose bowel movements but without fever.  GEN: mild distress secondary to pain, awake, eyes open spontaneously, obese by bmi  HEENT: NCAT, MMM, Trachea midline, normal conjunctiva, perrl  CHEST/LUNGS: Non-tachypneic, CTAB, bilateral breath sounds  CARDIAC: Non-tachycardic, normal perfusion  ABDOMEN: Soft, mild suprapubic tenderness to palpation and left lower quadrant pain, No rebound/guarding  MSK: No edema, no gross deformity of extremities  SKIN: No rashes, no petechiae, no vesicles  NEURO: CN grossly intact, normal coordination, no focal motor or sensory deficits  PSYCH: Alert, appropriate, cooperative, with capacity and insight   Patient with chief complaint of lower abdominal pain consistent with ? diverticulitis vs colitis vs less likely uti  will get iv, labs, fluids, ct a/p with iv/po contrast and reassess  Will follow up on labs, analgesia, reassess and disposition as clinically indicated.  The patient was re-examined after interventions and is feeling much better.  The patient will follow up with their primary physician this week.  No immediate life threatening issues present on history or clinical exam. Patient is a safe disposition home, has capacity and insight into their condition, is ambulatory in the Emergency Department with no further questions and will follow up with their doctor(s) this week. Patient  understands anticipatory guidance and was given strict return and follow up precautions. The patient has been informed of all concerning signs and symptoms to return to Emergency Department, the necessity to follow up with the PMD/Clinic/follow up provided within 2-3 days was explained, and the patient reports understanding of above with capacity and insight.

## 2018-06-26 NOTE — ED PROVIDER NOTE - CARE PLAN
Principal Discharge DX:	Abdominal pain in female patient  Assessment and plan of treatment:	Follow up with Dr. Junior in the next 2-3 days.  Follow up our surgeons if you wish, you may call [phone] and arrange a follow up appointment.   Follow up with your medical doctor in 2-3 days or call our clinic at [phone] and state you were seen in the Emergency Department and would like to be seen in clinic. You may also call [phone] to speak with a representative to assist follow up care with medicine, surgery, or specialists.    Take Tylenol 1 g every six hours and supplement (if allowed by your physician) with ibuprofen 600 mg, with food, every six hours which can be taken three hours apart from the Tylenol to have a layered effect.    Drink at least 2 Liters or 64 Ounces of water each day (UNLESS you are supposed to restrict fluids or have a history of congestive heart failure (CHF)).    Return for any persistent, worsening symptoms, or ANY concerns at all.

## 2018-06-26 NOTE — ED ADULT NURSE REASSESSMENT NOTE - NS ED NURSE REASSESS COMMENT FT1
Patient resting and comfortable. Patient reports mild relief in pain. VSS. Safety and comfort measures provided and maintained.

## 2018-06-26 NOTE — ED PROVIDER NOTE - OBJECTIVE STATEMENT
58 female presents with c/o lower abdominal pain since this AM. pt denies fever, chills, N/V, urinary symptoms. pt states has had h/o loose bowel movements since radiation therapy 20 years ago for uterine CA.

## 2018-06-26 NOTE — ED ADULT NURSE NOTE - OBJECTIVE STATEMENT
57 y/o F patient presents to ED from home c/o 8/10, non radiating, lower quadrant abdominal pain since this morning. As per patient she took Aleve with no relief. Patient reports similar episodes of abdominal pain in the past which subsided on its own. Patient is A&Ox3 lung sounds CTA. skin warm and dry. abdominal tenderness noted in lower quadrants bilaterally. ambulatory. Patient denies HA, dizziness, SOB, chest pain, V/D, bowel/bladder changes, fevers/chills, cough. VSS. Safety and comfort measures provided and maintained. MD at bedside. 59 y/o F patient presents to ED from home c/o 8/10, non radiating, lower quadrant abdominal pain since this morning. Patient states pain worsens upon exertion. As per patient she took Aleve with no relief. Patient reports similar episodes of abdominal pain in the past which subsided on its own. Patient is A&Ox3 lung sounds CTA. skin warm and dry. abdominal tenderness noted in lower quadrants bilaterally. ambulatory. Last BM was yesterday, normal as per patient. Patient denies HA, dizziness, SOB, chest pain, V/D, bowel/bladder changes, fevers/chills, cough. VSS. Safety and comfort measures provided and maintained. MD at bedside.

## 2018-06-27 ENCOUNTER — APPOINTMENT (OUTPATIENT)
Dept: SURGERY | Facility: CLINIC | Age: 58
End: 2018-06-27
Payer: COMMERCIAL

## 2018-06-27 VITALS
TEMPERATURE: 98 F | RESPIRATION RATE: 18 BRPM | DIASTOLIC BLOOD PRESSURE: 73 MMHG | SYSTOLIC BLOOD PRESSURE: 116 MMHG | OXYGEN SATURATION: 98 % | HEART RATE: 71 BPM

## 2018-06-27 VITALS
BODY MASS INDEX: 32.49 KG/M2 | DIASTOLIC BLOOD PRESSURE: 86 MMHG | HEART RATE: 63 BPM | WEIGHT: 195 LBS | RESPIRATION RATE: 14 BRPM | TEMPERATURE: 98.3 F | SYSTOLIC BLOOD PRESSURE: 142 MMHG | OXYGEN SATURATION: 99 % | HEIGHT: 65 IN

## 2018-06-27 DIAGNOSIS — Z87.42 PERSONAL HISTORY OF OTHER DISEASES OF THE FEMALE GENITAL TRACT: ICD-10-CM

## 2018-06-27 DIAGNOSIS — Z83.79 FAMILY HISTORY OF OTHER DISEASES OF THE DIGESTIVE SYSTEM: ICD-10-CM

## 2018-06-27 DIAGNOSIS — Z80.1 FAMILY HISTORY OF MALIGNANT NEOPLASM OF TRACHEA, BRONCHUS AND LUNG: ICD-10-CM

## 2018-06-27 DIAGNOSIS — K43.2 INCISIONAL HERNIA W/OUT OBSTRUCTION OR GANGRENE: ICD-10-CM

## 2018-06-27 DIAGNOSIS — R59.1 GENERALIZED ENLARGED LYMPH NODES: ICD-10-CM

## 2018-06-27 DIAGNOSIS — K40.90 UNILATERAL INGUINAL HERNIA, W/OUT OBSTRUCTION OR GANGRENE, NOT SPECIFIED AS RECURRENT: ICD-10-CM

## 2018-06-27 LAB
CULTURE RESULTS: NO GROWTH — SIGNIFICANT CHANGE UP
SPECIMEN SOURCE: SIGNIFICANT CHANGE UP

## 2018-06-27 PROCEDURE — 99244 OFF/OP CNSLTJ NEW/EST MOD 40: CPT

## 2018-06-27 RX ORDER — PANTOPRAZOLE 40 MG/1
40 TABLET, DELAYED RELEASE ORAL
Qty: 90 | Refills: 0 | Status: DISCONTINUED | COMMUNITY
Start: 2018-05-28

## 2018-06-27 RX ORDER — LIDOCAINE 5% 700 MG/1
5 PATCH TOPICAL
Qty: 30 | Refills: 0 | Status: DISCONTINUED | COMMUNITY
Start: 2018-04-17

## 2018-06-27 RX ORDER — NYSTATIN 100000 [USP'U]/ML
100000 SUSPENSION ORAL 4 TIMES DAILY
Qty: 1 | Refills: 0 | Status: DISCONTINUED | COMMUNITY
Start: 2017-08-22 | End: 2018-06-27

## 2018-06-27 RX ORDER — GABAPENTIN 300 MG/1
300 CAPSULE ORAL
Qty: 270 | Refills: 0 | Status: DISCONTINUED | COMMUNITY
Start: 2017-09-08

## 2018-06-27 RX ORDER — METFORMIN HYDROCHLORIDE 1000 MG/1
1000 TABLET, COATED ORAL
Qty: 30 | Refills: 0 | Status: DISCONTINUED | COMMUNITY
Start: 2018-04-17

## 2018-06-27 NOTE — CONSULT NOTE ADULT - SUBJECTIVE AND OBJECTIVE BOX
GENERAL SURGERY CONSULT NOTE    Patient is a 58y old  Female who presents with a chief complaint of abdominal pain    HPI: 58F w/ hx of ALY/BSO, ventral hernia repair w/ mesh who presents w/ LLQ abdominal pain for one day. It does not radiate. She denies nausea/vomiting, fever/chills. She has been able to tolerate PO. She has loose stools at baseline, but denies changes to bowel habits. She denies dysuria, hematuria.  CT in ED revealed fat containing hernia next to site of mesh from previous hernia repair.          PAST MEDICAL & SURGICAL HISTORY:  Hypertension  HLD (hyperlipidemia)  Transient ischemic attack: 2014  Vitamin D deficiency  Pneumonia: 2015  Seasonal allergies  GERD (gastroesophageal reflux disease)  Uterine Cancer: (diagnosed in ; subsequent TAHBSO and RTX)  Diabetes Mellitus  H/O total hysterectomy with bilateral salpingo-oophorectomy (BSO): (in  for diagnosis of uterine cancer)  History of Hernia Repair      FAMILY HISTORY:  No pertinent family history in first degree relatives      SOCIAL HISTORY: Nonsmoker    MEDICATIONS  (STANDING):    MEDICATIONS  (PRN):    Allergies    No Known Allergies    Intolerances        Vital Signs Last 24 Hrs  T(C): 36.8 (2018 00:19), Max: 36.9 (2018 20:08)  T(F): 98.3 (2018 00:19), Max: 98.5 (2018 20:08)  HR: 71 (2018 00:19) (62 - 71)  BP: 116/73 (2018 00:19) (116/73 - 153/78)  BP(mean): --  RR: 18 (2018 00:19) (18 - 20)  SpO2: 98% (2018 00:19) (98% - 100%)  Daily     Daily     NAD, awake and alert  no jaundice or scleral icterus  Respirations nonlabored  CV regular  Abdomen soft, point tenderness LLQ, nondistended  No guarding or rebound tenderness   Unable to appreciate any bulge or mass  Old incisions well healed  Extremities warm                        13.7   8.7   )-----------( 198      ( 2018 20:35 )             42.0     06-26    141  |  100  |  13  ----------------------------<  127<H>  4.0   |  29  |  0.75    Ca    9.5      2018 20:35    TPro  7.6  /  Alb  4.5  /  TBili  0.6  /  DBili  x   /  AST  26  /  ALT  31  /  AlkPhos  94      PT/INR - ( 2018 20:35 )   PT: 10.2 sec;   INR: 0.94 ratio         PTT - ( 2018 20:35 )  PTT:28.5 sec  Urinalysis Basic - ( 2018 21:15 )    Color: PL Yellow / Appearance: Clear / S.008 / pH: x  Gluc: x / Ketone: Negative  / Bili: Negative / Urobili: Negative   Blood: x / Protein: Negative / Nitrite: Negative   Leuk Esterase: Negative / RBC: x / WBC x   Sq Epi: x / Non Sq Epi: OCC /HPF / Bacteria: x        IMAGING STUDIES:  < from: CT Abdomen and Pelvis w/ Oral Cont and w/ IV Cont (18 @ 21:29) >  *** ADDENDUM 2018  ***    There is a parastomal fat-containing hernia in the left lower abdomen   (4-81) without stranding or fluid collection. No CT evidence of   strangulation.      *** END OF ADDENDUM 2018  ***      PROCEDURE DATE:  2018            INTERPRETATION:  CLINICAL INFORMATION: Left lower quadrant pain and mid   abdominal tenderness to palpation.    COMPARISON: Abdominopelvic CT 2017    PROCEDURE:   CT of the Abdomen and Pelvis was performed with intravenous contrast.   Intravenous contrast: 90 ml Omnipaque 350. 10 ml discarded.  Oral contrast: positive contrast was administered.  Sagittal and coronal reformats were performed.    FINDINGS:    LOWER CHEST: Within normal limits.    LIVER: Hepatic steatosis.  BILE DUCTS: Normal caliber.  GALLBLADDER: Gallstones. No wall thickening or pericholecystic free fluid.  SPLEEN: Within normal limits.  PANCREAS: Within normal limits.  ADRENALS: Within normal limits.  KIDNEYS/URETERS: Within normal limits.    BLADDER: Within normal limits.  REPRODUCTIVE ORGANS: Status post total abdominal hysterectomy and   bilateral salpingo-oophorectomy.    BOWEL: No bowel obstruction. Appendix is not visualized.  PERITONEUM: No ascites.  VESSELS:  Within normal limits.  RETROPERITONEUM: Scattered surgical clips.    ABDOMINAL WALL: Status post anterior abdominal wall repair.  BONES: Degenerative changes of the spine.    IMPRESSION: No cause for left lower quadrant pain is identified.              ***Please see the addendum at the top of this report. It may contain   additional important information or changes.****    < end of copied text >

## 2018-06-27 NOTE — CONSULT NOTE ADULT - ASSESSMENT
58F w/ recurrent ventral hernia    - pain control  - pt is stable for d/c  - she can f/u outpatient for elective repair  - d/w Dr. Gregory Lim MD  [id number]

## 2018-07-12 ENCOUNTER — OUTPATIENT (OUTPATIENT)
Dept: OUTPATIENT SERVICES | Facility: HOSPITAL | Age: 58
LOS: 1 days | End: 2018-07-12
Payer: COMMERCIAL

## 2018-07-12 VITALS
RESPIRATION RATE: 18 BRPM | DIASTOLIC BLOOD PRESSURE: 81 MMHG | SYSTOLIC BLOOD PRESSURE: 121 MMHG | TEMPERATURE: 98 F | OXYGEN SATURATION: 99 % | HEART RATE: 65 BPM | WEIGHT: 188.94 LBS | HEIGHT: 65 IN

## 2018-07-12 DIAGNOSIS — Z90.710 ACQUIRED ABSENCE OF BOTH CERVIX AND UTERUS: Chronic | ICD-10-CM

## 2018-07-12 DIAGNOSIS — E11.9 TYPE 2 DIABETES MELLITUS WITHOUT COMPLICATIONS: ICD-10-CM

## 2018-07-12 DIAGNOSIS — J45.909 UNSPECIFIED ASTHMA, UNCOMPLICATED: ICD-10-CM

## 2018-07-12 DIAGNOSIS — K43.2 INCISIONAL HERNIA WITHOUT OBSTRUCTION OR GANGRENE: ICD-10-CM

## 2018-07-12 DIAGNOSIS — Z01.818 ENCOUNTER FOR OTHER PREPROCEDURAL EXAMINATION: ICD-10-CM

## 2018-07-12 DIAGNOSIS — Z98.890 OTHER SPECIFIED POSTPROCEDURAL STATES: Chronic | ICD-10-CM

## 2018-07-12 LAB
HBA1C BLD-MCNC: 7.4 % — HIGH (ref 4–5.6)
MRSA PCR RESULT.: SIGNIFICANT CHANGE UP
S AUREUS DNA NOSE QL NAA+PROBE: SIGNIFICANT CHANGE UP

## 2018-07-12 PROCEDURE — G0463: CPT

## 2018-07-12 PROCEDURE — 87640 STAPH A DNA AMP PROBE: CPT

## 2018-07-12 PROCEDURE — 87641 MR-STAPH DNA AMP PROBE: CPT

## 2018-07-12 PROCEDURE — 83036 HEMOGLOBIN GLYCOSYLATED A1C: CPT

## 2018-07-12 RX ORDER — CEFAZOLIN SODIUM 1 G
2000 VIAL (EA) INJECTION ONCE
Qty: 0 | Refills: 0 | Status: DISCONTINUED | OUTPATIENT
Start: 2018-07-20 | End: 2018-07-22

## 2018-07-12 RX ORDER — LIDOCAINE HCL 20 MG/ML
0.2 VIAL (ML) INJECTION ONCE
Qty: 0 | Refills: 0 | Status: DISCONTINUED | OUTPATIENT
Start: 2018-07-20 | End: 2018-07-22

## 2018-07-12 NOTE — H&P PST ADULT - PSH
H/O carpal tunnel repair  Right ( 2015)  H/O total hysterectomy with bilateral salpingo-oophorectomy (BSO)  (in 1999 for diagnosis of uterine cancer)  History of Hernia Repair  10 years ago

## 2018-07-12 NOTE — H&P PST ADULT - PMH
Asthma  PRN meds - denies any h/o asthma exacerbation  Diabetes Mellitus  Type 2  GERD (gastroesophageal reflux disease)    History of Bell's palsy    History of chest pain  2013 -all cardiac test was normal -as per patient  HLD (hyperlipidemia)  no meds  Hypertension  no medication - as per patient blood pressure is normal  Incisional hernia without obstruction or gangrene    Pneumonia  April 2015  Seasonal allergies    Transient ischemic attack  July 2014  Uterine Cancer  (diagnosed in 1999; subsequent TAHBSO and RTX)  Vitamin D deficiency

## 2018-07-12 NOTE — H&P PST ADULT - RS GEN PE MLT RESP DETAILS PC
respirations non-labored/good air movement/airway patent/normal/breath sounds equal/clear to auscultation bilaterally

## 2018-07-12 NOTE — H&P PST ADULT - PROBLEM SELECTOR PLAN 1
Left Incisional Hernia repair   Pre- Op instruction discussed   MRSA/MSSa s ,HgA1c sent , Recent labs reviewed - sunrise (7/26/2018)

## 2018-07-12 NOTE — H&P PST ADULT - HISTORY OF PRESENT ILLNESS
57 y/o female with PMH of  Asthma ( denies any h/o recent exacerbation ), uterine cancer treated with ALY/ radiation (1999), DM Type 2 ( unknown Hga1c) , HTN ( no meds - BP WDL) . Pt has been experiencing severe LLQ pain s/p ED visit to [facility] ( 7/26/2018), Ct abdomen revealed parastomal hernia seen and evaluated by Dr Alfonso . Presents to PST for scheduled Left Hernia Repair on 7/20/2018.

## 2018-07-12 NOTE — H&P PST ADULT - NEGATIVE GASTROINTESTINAL SYMPTOMS
no flatulence/no melena/no steatorrhea/no jaundice/no hematochezia/no hiccoughs/no change in bowel habits/no nausea/no diarrhea/no constipation/no vomiting

## 2018-07-13 PROBLEM — I10 ESSENTIAL (PRIMARY) HYPERTENSION: Chronic | Status: ACTIVE | Noted: 2017-08-17

## 2018-07-13 PROBLEM — J45.909 UNSPECIFIED ASTHMA, UNCOMPLICATED: Chronic | Status: ACTIVE | Noted: 2018-07-12

## 2018-07-19 ENCOUNTER — TRANSCRIPTION ENCOUNTER (OUTPATIENT)
Age: 58
End: 2018-07-19

## 2018-07-20 ENCOUNTER — APPOINTMENT (OUTPATIENT)
Dept: SURGERY | Facility: HOSPITAL | Age: 58
End: 2018-07-20
Payer: COMMERCIAL

## 2018-07-20 ENCOUNTER — INPATIENT (INPATIENT)
Facility: HOSPITAL | Age: 58
LOS: 1 days | Discharge: HOME | DRG: 355 | End: 2018-07-22
Attending: SURGERY | Admitting: SURGERY
Payer: COMMERCIAL

## 2018-07-20 ENCOUNTER — TRANSCRIPTION ENCOUNTER (OUTPATIENT)
Age: 58
End: 2018-07-20

## 2018-07-20 VITALS
HEART RATE: 63 BPM | DIASTOLIC BLOOD PRESSURE: 83 MMHG | SYSTOLIC BLOOD PRESSURE: 146 MMHG | WEIGHT: 188.94 LBS | OXYGEN SATURATION: 98 % | RESPIRATION RATE: 20 BRPM | TEMPERATURE: 98 F | HEIGHT: 65 IN

## 2018-07-20 DIAGNOSIS — Z98.890 OTHER SPECIFIED POSTPROCEDURAL STATES: Chronic | ICD-10-CM

## 2018-07-20 DIAGNOSIS — K43.2 INCISIONAL HERNIA WITHOUT OBSTRUCTION OR GANGRENE: ICD-10-CM

## 2018-07-20 DIAGNOSIS — Z01.818 ENCOUNTER FOR OTHER PREPROCEDURAL EXAMINATION: ICD-10-CM

## 2018-07-20 DIAGNOSIS — Z90.710 ACQUIRED ABSENCE OF BOTH CERVIX AND UTERUS: Chronic | ICD-10-CM

## 2018-07-20 LAB
GLUCOSE BLDC GLUCOMTR-MCNC: 115 MG/DL — HIGH (ref 70–99)
GLUCOSE BLDC GLUCOMTR-MCNC: 163 MG/DL — HIGH (ref 70–99)
GLUCOSE BLDC GLUCOMTR-MCNC: 216 MG/DL — HIGH (ref 70–99)

## 2018-07-20 PROCEDURE — 49565: CPT

## 2018-07-20 PROCEDURE — 49568: CPT

## 2018-07-20 PROCEDURE — 49565: CPT | Mod: 82

## 2018-07-20 PROCEDURE — 49568: CPT | Mod: 82

## 2018-07-20 RX ORDER — OXYCODONE HYDROCHLORIDE 5 MG/1
5 TABLET ORAL EVERY 6 HOURS
Qty: 0 | Refills: 0 | Status: DISCONTINUED | OUTPATIENT
Start: 2018-07-20 | End: 2018-07-21

## 2018-07-20 RX ORDER — SODIUM CHLORIDE 9 MG/ML
3 INJECTION INTRAMUSCULAR; INTRAVENOUS; SUBCUTANEOUS EVERY 8 HOURS
Qty: 0 | Refills: 0 | Status: DISCONTINUED | OUTPATIENT
Start: 2018-07-20 | End: 2018-07-20

## 2018-07-20 RX ORDER — HYDROMORPHONE HYDROCHLORIDE 2 MG/ML
0.5 INJECTION INTRAMUSCULAR; INTRAVENOUS; SUBCUTANEOUS
Qty: 0 | Refills: 0 | Status: DISCONTINUED | OUTPATIENT
Start: 2018-07-20 | End: 2018-07-20

## 2018-07-20 RX ORDER — DEXTROSE 50 % IN WATER 50 %
15 SYRINGE (ML) INTRAVENOUS ONCE
Qty: 0 | Refills: 0 | Status: DISCONTINUED | OUTPATIENT
Start: 2018-07-20 | End: 2018-07-22

## 2018-07-20 RX ORDER — SODIUM CHLORIDE 9 MG/ML
1000 INJECTION, SOLUTION INTRAVENOUS
Qty: 0 | Refills: 0 | Status: DISCONTINUED | OUTPATIENT
Start: 2018-07-20 | End: 2018-07-22

## 2018-07-20 RX ORDER — ENOXAPARIN SODIUM 100 MG/ML
40 INJECTION SUBCUTANEOUS DAILY
Qty: 0 | Refills: 0 | Status: DISCONTINUED | OUTPATIENT
Start: 2018-07-20 | End: 2018-07-22

## 2018-07-20 RX ORDER — SODIUM CHLORIDE 9 MG/ML
1000 INJECTION, SOLUTION INTRAVENOUS
Qty: 0 | Refills: 0 | Status: DISCONTINUED | OUTPATIENT
Start: 2018-07-20 | End: 2018-07-21

## 2018-07-20 RX ORDER — GLUCAGON INJECTION, SOLUTION 0.5 MG/.1ML
1 INJECTION, SOLUTION SUBCUTANEOUS ONCE
Qty: 0 | Refills: 0 | Status: DISCONTINUED | OUTPATIENT
Start: 2018-07-20 | End: 2018-07-22

## 2018-07-20 RX ORDER — DEXTROSE 50 % IN WATER 50 %
25 SYRINGE (ML) INTRAVENOUS ONCE
Qty: 0 | Refills: 0 | Status: DISCONTINUED | OUTPATIENT
Start: 2018-07-20 | End: 2018-07-22

## 2018-07-20 RX ORDER — ONDANSETRON 8 MG/1
4 TABLET, FILM COATED ORAL ONCE
Qty: 0 | Refills: 0 | Status: DISCONTINUED | OUTPATIENT
Start: 2018-07-20 | End: 2018-07-20

## 2018-07-20 RX ORDER — OXYCODONE HYDROCHLORIDE 5 MG/1
10 TABLET ORAL EVERY 6 HOURS
Qty: 0 | Refills: 0 | Status: DISCONTINUED | OUTPATIENT
Start: 2018-07-20 | End: 2018-07-21

## 2018-07-20 RX ORDER — INSULIN LISPRO 100/ML
VIAL (ML) SUBCUTANEOUS
Qty: 0 | Refills: 0 | Status: DISCONTINUED | OUTPATIENT
Start: 2018-07-20 | End: 2018-07-22

## 2018-07-20 RX ORDER — ACETAMINOPHEN 500 MG
650 TABLET ORAL EVERY 6 HOURS
Qty: 0 | Refills: 0 | Status: DISCONTINUED | OUTPATIENT
Start: 2018-07-20 | End: 2018-07-22

## 2018-07-20 RX ORDER — DEXTROSE 50 % IN WATER 50 %
12.5 SYRINGE (ML) INTRAVENOUS ONCE
Qty: 0 | Refills: 0 | Status: DISCONTINUED | OUTPATIENT
Start: 2018-07-20 | End: 2018-07-22

## 2018-07-20 RX ADMIN — Medication 650 MILLIGRAM(S): at 23:30

## 2018-07-20 RX ADMIN — Medication 650 MILLIGRAM(S): at 18:31

## 2018-07-20 RX ADMIN — Medication 650 MILLIGRAM(S): at 19:31

## 2018-07-20 RX ADMIN — SODIUM CHLORIDE 50 MILLILITER(S): 9 INJECTION, SOLUTION INTRAVENOUS at 17:24

## 2018-07-20 NOTE — DISCHARGE NOTE ADULT - HOSPITAL COURSE
57 y/o female with PMH of  Asthma, uterine cancer treated with ALY/ radiation (1999), DM Type 2 ( unknown Hga1c) , HTN ( no meds - BP WDL) . Pt had been experiencing severe LLQ pain s/p ED visit to [facility] ( 7/26/2018), Ct abdomen revealed parastomal hernia seen and evaluated by Dr Alfonso . Presented scheduled Left Incisional Hernia Repair with Mesh on 7/20/2018. Operation completed without complication in OR. Transferred to floor from PACU when criteria met. On floor pt was ambulating, voiding and tolerating PO intake.Pain was well controlled with PO medication. On the day of discharge pt was stable and had outpatient follow up arranged. 57 y/o female with PMH of  Asthma, uterine cancer treated with ALY/ radiation (1999), DM Type 2 ( unknown Hga1c) , HTN ( no meds - BP WDL) . Pt had been experiencing severe LLQ pain s/p ED visit to [facility] ( 7/26/2018), Ct abdomen revealed parastomal hernia seen and evaluated by Dr Alfonso . Presented scheduled Left Incisional Hernia Repair with Mesh on 7/20/2018. Operation completed without complication in OR. Transferred to floor from PACU when criteria met. On floor pt was ambulating, voiding and tolerating PO intake. She remained in the hospital for improved pain control. On the day of day of discharge her pain was well controlled with PO medication. On the day of discharge pt was stable and had outpatient follow up arranged.

## 2018-07-20 NOTE — DISCHARGE NOTE ADULT - PLAN OF CARE
Recover from surgery WOUND CARE:   BATHING: Please do not submerge wound underwater. You may shower and/or sponge bathe.  ACTIVITY: No heavy lifting or straining. Otherwise, you may return to your usual level of physical activity. If you are taking narcotic pain medication (such as Percocet), do NOT drive a car, operate machinery or make important decisions.  DIET: Return to your usual diet.  NOTIFY YOUR SURGEON IF: You have any bleeding that does not stop, any pus draining from your wound, any fever (over 100.4 F) or chills, persistent nausea/vomiting, persistent diarrhea, or if your pain is not controlled on your discharge pain medications.  FOLLOW-UP:  1. Please call to make a follow-up appointment within one week of discharge   2. Please follow up with your primary care physician in one week regarding your hospitalization.    PLEASE TAKE MIRALAX TOMORROW IF YOU DO NOT HAVE A BOWEL MOVEMENT BY THEN

## 2018-07-20 NOTE — DISCHARGE NOTE ADULT - INSTRUCTIONS
Regular Diet     WOUND CARE:   BATHING: Please do not submerge wound underwater. You may shower and/or sponge bathe.  ACTIVITY: No heavy lifting or straining. Otherwise, you may return to your usual level of physical activity. If you are taking narcotic pain medication (such as Percocet), do NOT drive a car, operate machinery or make important decisions.  DIET: Return to your usual diet.  NOTIFY YOUR SURGEON IF: You have any bleeding that does not stop, any pus draining from your wound, any fever (over 100.4 F) or chills, persistent nausea/vomiting, persistent diarrhea, or if your pain is not controlled on your discharge pain medications.  FOLLOW-UP:  1. Follow-up with  within 1-2 weeks of discharge.  Please call office for appointment  2. Please follow up with your primary care physician in one week regarding your hospitalization. if any fevers, drainage from incision sites or uncontrollable pain notify MD.

## 2018-07-20 NOTE — CHART NOTE - NSCHARTNOTEFT_GEN_A_CORE
Post Operative Note    Time out of OR: 13:40    Time of Post Operative Check: 17:50    Procedure: Repair of incisional hernia with mesh    Subjective: Patient seen and examined at bedside. She is feeling well. She denies nausea/vomiting/SOB. She has voided, but is not yet ambulating    Objective:    T(C): 36.5 (07-20-18 @ 18:02), Max: 36.7 (07-20-18 @ 09:17)  HR: 66 (07-20-18 @ 18:02) (59 - 98)  BP: 133/78 (07-20-18 @ 18:02) (115/56 - 146/83)  RR: 16 (07-20-18 @ 18:02) (16 - 20)  SpO2: 96% (07-20-18 @ 18:02) (93% - 100%)      07-20-18 @ 07:01  -  07-20-18 @ 18:55  --------------------------------------------------------  IN: 370 mL / OUT: 500 mL / NET: -130 mL        Physical Exam:  General: NAD  Abd: soft, not distended, appropriately tender to palpation, dressing c/d/i      Assessment/Plan:  58 F s/p incisional hernia repair, doing well    - Diet: CLD  - DVT ppx  - Ambulate/IS  - Pain control

## 2018-07-20 NOTE — DISCHARGE NOTE ADULT - PATIENT PORTAL LINK FT
You can access the [email or web address], offered by [facility], by registering with the following website: [email or web address]

## 2018-07-20 NOTE — BRIEF OPERATIVE NOTE - PROCEDURE
<<-----Click on this checkbox to enter Procedure Repair of incisional hernia with mesh  07/20/2018    Active  SSHTERENTA

## 2018-07-20 NOTE — DISCHARGE NOTE ADULT - CARE PLAN
Principal Discharge DX:	Incisional hernia without obstruction or gangrene  Goal:	Recover from surgery  Assessment and plan of treatment:	Recover from surgery Principal Discharge DX:	Incisional hernia without obstruction or gangrene  Goal:	Recover from surgery  Assessment and plan of treatment:	WOUND CARE:   BATHING: Please do not submerge wound underwater. You may shower and/or sponge bathe.  ACTIVITY: No heavy lifting or straining. Otherwise, you may return to your usual level of physical activity. If you are taking narcotic pain medication (such as Percocet), do NOT drive a car, operate machinery or make important decisions.  DIET: Return to your usual diet.  NOTIFY YOUR SURGEON IF: You have any bleeding that does not stop, any pus draining from your wound, any fever (over 100.4 F) or chills, persistent nausea/vomiting, persistent diarrhea, or if your pain is not controlled on your discharge pain medications.  FOLLOW-UP:  1. Please call to make a follow-up appointment within one week of discharge   2. Please follow up with your primary care physician in one week regarding your hospitalization.    PLEASE TAKE MIRALAX TOMORROW IF YOU DO NOT HAVE A BOWEL MOVEMENT BY THEN

## 2018-07-20 NOTE — DISCHARGE NOTE ADULT - MEDICATION SUMMARY - MEDICATIONS TO TAKE
I will START or STAY ON the medications listed below when I get home from the hospital:    acetaminophen 325 mg oral tablet  -- 2 tab(s) by mouth every 6 hours  -- Indication: For for pain    oxyCODONE 5 mg oral capsule  -- 1 tab(s) by mouth every 6 hours  prn moderate pain. Take 2 tabs every 6 hours for sever pain MDD:4 tabs  -- Caution federal law prohibits the transfer of this drug to any person other  than the person for whom it was prescribed.  It is very important that you take or use this exactly as directed.  Do not skip doses or discontinue unless directed by your doctor.  May cause drowsiness.  Alcohol may intensify this effect.  Use care when operating dangerous machinery.  This prescription cannot be refilled.  Using more of this medication than prescribed may cause serious breathing problems.    -- Indication: For take for moderate-severe pain    Vitamin K1 100 mcg oral tablet  -- 1 tab(s) by mouth once a day  -- Indication: For Vitamin    Synjardy 5 mg-1000 mg oral tablet  -- 1 tab(s) by mouth once a day  -- Indication: For Hyperglycemia    metFORMIN 1000 mg oral tablet  -- orally once a day  -- Indication: For Hyperglycemia    ADVAIR 250-50 DISKUS  -- Indication: For Asthma    PROVENTIL HFA 90 MCG INHALER  -- Indication: For ASthma    Co Q-10 100 mg oral capsule  -- 1 cap(s) by mouth once a day  -- Indication: For Vitamin    Cholestin 600 mg oral capsule (obsolete)  -- 2 cap(s) by mouth once a day  -- Indication: For Vitamin    Alpha Lipoic 300 mg oral tablet  -- 1 tab(s) by mouth once a day  -- Indication: For Vitamin    Essential Balance oral tablet  -- 1 tab(s) by mouth once a day  -- Indication: For Vitamin    Vitamin D3 2000 intl units oral capsule  -- 1 cap(s) by mouth once a day  -- Indication: For Vitamin I will START or STAY ON the medications listed below when I get home from the hospital:    acetaminophen 325 mg oral tablet  -- 2 tab(s) by mouth every 6 hours  -- Indication: For for pain    oxyCODONE 5 mg oral capsule  -- 1 tab(s) by mouth every 6 hours  prn moderate pain. Take 2 tabs every 6 hours for sever pain MDD:4 tabs  -- Caution federal law prohibits the transfer of this drug to any person other  than the person for whom it was prescribed.  It is very important that you take or use this exactly as directed.  Do not skip doses or discontinue unless directed by your doctor.  May cause drowsiness.  Alcohol may intensify this effect.  Use care when operating dangerous machinery.  This prescription cannot be refilled.  Using more of this medication than prescribed may cause serious breathing problems.    -- Indication: For take for moderate-severe pain    ibuprofen 600 mg oral tablet  -- 1 tab(s) by mouth every 6 hours  -- Indication: For for pain    Vitamin K1 100 mcg oral tablet  -- 1 tab(s) by mouth once a day  -- Indication: For Vitamin    Synjardy 5 mg-1000 mg oral tablet  -- 1 tab(s) by mouth once a day  -- Indication: For Hyperglycemia    metFORMIN 1000 mg oral tablet  -- orally once a day  -- Indication: For Hyperglycemia    ADVAIR 250-50 DISKUS  -- Indication: For Asthma    PROVENTIL HFA 90 MCG INHALER  -- Indication: For ASthma    MiraLax oral powder for reconstitution  -- 1 each by mouth once a day   -- Indication: For laxative    Alpha Lipoic 300 mg oral tablet  -- 1 tab(s) by mouth once a day  -- Indication: For Vitamin    Co Q-10 100 mg oral capsule  -- 1 cap(s) by mouth once a day  -- Indication: For Vitamin    Cholestin 600 mg oral capsule (obsolete)  -- 2 cap(s) by mouth once a day  -- Indication: For Vitamin    Essential Balance oral tablet  -- 1 tab(s) by mouth once a day  -- Indication: For Vitamin    Vitamin D3 2000 intl units oral capsule  -- 1 cap(s) by mouth once a day  -- Indication: For Vitamin

## 2018-07-20 NOTE — DISCHARGE NOTE ADULT - CARE PROVIDER_API CALL
Mayra Jenkins), ColonRectal Surgery; Surgery  [address] [facility]  [address] [place] [id number]  Phone: [phone]  Fax: [phone]

## 2018-07-21 LAB
GLUCOSE BLDC GLUCOMTR-MCNC: 117 MG/DL — HIGH (ref 70–99)
GLUCOSE BLDC GLUCOMTR-MCNC: 128 MG/DL — HIGH (ref 70–99)
GLUCOSE BLDC GLUCOMTR-MCNC: 135 MG/DL — HIGH (ref 70–99)
GLUCOSE BLDC GLUCOMTR-MCNC: 178 MG/DL — HIGH (ref 70–99)

## 2018-07-21 RX ORDER — IBUPROFEN 200 MG
400 TABLET ORAL EVERY 6 HOURS
Qty: 0 | Refills: 0 | Status: DISCONTINUED | OUTPATIENT
Start: 2018-07-21 | End: 2018-07-22

## 2018-07-21 RX ORDER — OXYCODONE HYDROCHLORIDE 5 MG/1
1 TABLET ORAL
Qty: 20 | Refills: 0 | OUTPATIENT
Start: 2018-07-21 | End: 2018-07-25

## 2018-07-21 RX ORDER — OXYCODONE HYDROCHLORIDE 5 MG/1
5 TABLET ORAL EVERY 6 HOURS
Qty: 0 | Refills: 0 | Status: DISCONTINUED | OUTPATIENT
Start: 2018-07-21 | End: 2018-07-22

## 2018-07-21 RX ORDER — DOCUSATE SODIUM 100 MG
100 CAPSULE ORAL THREE TIMES A DAY
Qty: 0 | Refills: 0 | Status: DISCONTINUED | OUTPATIENT
Start: 2018-07-21 | End: 2018-07-22

## 2018-07-21 RX ORDER — KETOROLAC TROMETHAMINE 30 MG/ML
15 SYRINGE (ML) INJECTION ONCE
Qty: 0 | Refills: 0 | Status: DISCONTINUED | OUTPATIENT
Start: 2018-07-21 | End: 2018-07-21

## 2018-07-21 RX ORDER — ACETAMINOPHEN 500 MG
2 TABLET ORAL
Qty: 0 | Refills: 0 | COMMUNITY
Start: 2018-07-21

## 2018-07-21 RX ORDER — KETOROLAC TROMETHAMINE 30 MG/ML
15 SYRINGE (ML) INJECTION EVERY 6 HOURS
Qty: 0 | Refills: 0 | Status: DISCONTINUED | OUTPATIENT
Start: 2018-07-21 | End: 2018-07-21

## 2018-07-21 RX ADMIN — Medication 400 MILLIGRAM(S): at 20:04

## 2018-07-21 RX ADMIN — Medication 650 MILLIGRAM(S): at 08:35

## 2018-07-21 RX ADMIN — Medication 650 MILLIGRAM(S): at 08:05

## 2018-07-21 RX ADMIN — OXYCODONE HYDROCHLORIDE 5 MILLIGRAM(S): 5 TABLET ORAL at 08:35

## 2018-07-21 RX ADMIN — OXYCODONE HYDROCHLORIDE 10 MILLIGRAM(S): 5 TABLET ORAL at 04:21

## 2018-07-21 RX ADMIN — OXYCODONE HYDROCHLORIDE 10 MILLIGRAM(S): 5 TABLET ORAL at 16:20

## 2018-07-21 RX ADMIN — OXYCODONE HYDROCHLORIDE 5 MILLIGRAM(S): 5 TABLET ORAL at 08:04

## 2018-07-21 RX ADMIN — Medication 15 MILLIGRAM(S): at 10:20

## 2018-07-21 RX ADMIN — OXYCODONE HYDROCHLORIDE 10 MILLIGRAM(S): 5 TABLET ORAL at 11:02

## 2018-07-21 RX ADMIN — Medication 400 MILLIGRAM(S): at 21:00

## 2018-07-21 RX ADMIN — Medication 650 MILLIGRAM(S): at 23:47

## 2018-07-21 RX ADMIN — Medication 100 MILLIGRAM(S): at 22:05

## 2018-07-21 RX ADMIN — Medication 15 MILLIGRAM(S): at 11:03

## 2018-07-21 RX ADMIN — OXYCODONE HYDROCHLORIDE 10 MILLIGRAM(S): 5 TABLET ORAL at 03:51

## 2018-07-21 RX ADMIN — ENOXAPARIN SODIUM 40 MILLIGRAM(S): 100 INJECTION SUBCUTANEOUS at 13:07

## 2018-07-21 RX ADMIN — OXYCODONE HYDROCHLORIDE 10 MILLIGRAM(S): 5 TABLET ORAL at 10:20

## 2018-07-21 RX ADMIN — Medication 650 MILLIGRAM(S): at 13:06

## 2018-07-21 RX ADMIN — Medication 650 MILLIGRAM(S): at 18:13

## 2018-07-21 NOTE — PROGRESS NOTE ADULT - ATTENDING COMMENTS
Surgery Attending    Pt seen and examined; agree with resident assessment and plan    Pt c/o incisional pain but is otherwise doing well    +GI function, no N/V, tolerating PO  Abdominal exam benign exc for incisional tenderness    Advance diet, ambulate, discharge later today if pain adequately controlled.

## 2018-07-21 NOTE — PROGRESS NOTE ADULT - ASSESSMENT
58 F POD1 s/p incisional hernia repair, doing well    - Advance diet  - DVT ppx  - Ambulate/IS  - Pain control  - home today

## 2018-07-21 NOTE — PROGRESS NOTE ADULT - SUBJECTIVE AND OBJECTIVE BOX
[facility] Surgery Progress Note    Subjective: Patient seen and examined. HARINI. She is feeling well and denies nausea/vomiting/SOB.     Vital Signs Last 24 Hrs  T(C): 36.4 (20 Jul 2018 21:10), Max: 36.7 (20 Jul 2018 09:17)  T(F): 97.6 (20 Jul 2018 21:10), Max: 98.1 (20 Jul 2018 09:17)  HR: 64 (20 Jul 2018 21:10) (59 - 98)  BP: 64/- (20 Jul 2018 21:10) (64/- - 146/83)  BP(mean): 83 (20 Jul 2018 14:30) (81 - 99)  RR: 16 (20 Jul 2018 21:10) (16 - 20)  SpO2: 95% (20 Jul 2018 21:10) (93% - 100%)    I&O's Summary    20 Jul 2018 07:01  -  21 Jul 2018 02:37  --------------------------------------------------------  IN: 540 mL / OUT: 1900 mL / NET: -1360 mL      Physical Exam:   General: NAD  Abd: soft, not distended, appropriately tender to palpation, dressing c/d/i    Radiographs: No new imaging

## 2018-07-22 VITALS
HEART RATE: 62 BPM | OXYGEN SATURATION: 97 % | RESPIRATION RATE: 16 BRPM | DIASTOLIC BLOOD PRESSURE: 77 MMHG | SYSTOLIC BLOOD PRESSURE: 143 MMHG | TEMPERATURE: 98 F

## 2018-07-22 LAB — GLUCOSE BLDC GLUCOMTR-MCNC: 122 MG/DL — HIGH (ref 70–99)

## 2018-07-22 PROCEDURE — C1781: CPT

## 2018-07-22 PROCEDURE — 82962 GLUCOSE BLOOD TEST: CPT

## 2018-07-22 RX ADMIN — Medication 650 MILLIGRAM(S): at 11:11

## 2018-07-22 RX ADMIN — Medication 650 MILLIGRAM(S): at 06:00

## 2018-07-22 RX ADMIN — Medication 400 MILLIGRAM(S): at 09:25

## 2018-07-22 RX ADMIN — Medication 650 MILLIGRAM(S): at 00:45

## 2018-07-22 RX ADMIN — ENOXAPARIN SODIUM 40 MILLIGRAM(S): 100 INJECTION SUBCUTANEOUS at 11:11

## 2018-07-22 RX ADMIN — Medication 400 MILLIGRAM(S): at 08:55

## 2018-07-22 RX ADMIN — OXYCODONE HYDROCHLORIDE 5 MILLIGRAM(S): 5 TABLET ORAL at 11:11

## 2018-07-22 RX ADMIN — Medication 650 MILLIGRAM(S): at 05:03

## 2018-07-22 RX ADMIN — Medication 100 MILLIGRAM(S): at 05:03

## 2018-07-22 RX ADMIN — OXYCODONE HYDROCHLORIDE 5 MILLIGRAM(S): 5 TABLET ORAL at 11:41

## 2018-07-22 RX ADMIN — Medication 650 MILLIGRAM(S): at 11:41

## 2018-07-22 NOTE — PROGRESS NOTE ADULT - ASSESSMENT
58 F POD 2 s/p incisional hernia repair, doing well  - Reg diet  - DVT ppx  - Ambulate/IS  - Pain control  - D/c planning

## 2018-07-22 NOTE — PROGRESS NOTE ADULT - SUBJECTIVE AND OBJECTIVE BOX
Green Surgery Progress Note    Subjective: Patient seen and examined. HARINI. She is feeling well and denies nausea/vomiting/SOB. Pain controlled improved yesterday with IV toradol. IV access lost this am.    Vital Signs Last 24 Hrs  T(C): 36.4 (20 Jul 2018 21:10), Max: 36.7 (20 Jul 2018 09:17)  T(F): 97.6 (20 Jul 2018 21:10), Max: 98.1 (20 Jul 2018 09:17)  HR: 64 (20 Jul 2018 21:10) (59 - 98)  BP: 64/- (20 Jul 2018 21:10) (64/- - 146/83)  BP(mean): 83 (20 Jul 2018 14:30) (81 - 99)  RR: 16 (20 Jul 2018 21:10) (16 - 20)  SpO2: 95% (20 Jul 2018 21:10) (93% - 100%)    I&O's Summary    20 Jul 2018 07:01  -  21 Jul 2018 02:37  --------------------------------------------------------  IN: 540 mL / OUT: 1900 mL / NET: -1360 mL  Vital Signs Last 24 Hrs  T(C): 36.7 (22 Jul 2018 05:16), Max: 36.7 (21 Jul 2018 19:13)  T(F): 98 (22 Jul 2018 05:16), Max: 98.1 (21 Jul 2018 19:13)  HR: 61 (22 Jul 2018 05:16) (56 - 66)  BP: 127/81 (22 Jul 2018 05:16) (113/60 - 127/81)  BP(mean): --  RR: 18 (22 Jul 2018 05:16) (17 - 18)  SpO2: 95% (22 Jul 2018 05:16) (93% - 97%)    Physical Exam:   General: NAD  Abd: soft, not distended, appropriately tender to palpation, dressing c/d/i  Ext: WWP  Radiographs: No new imaging

## 2018-07-23 ENCOUNTER — MOBILE ON CALL (OUTPATIENT)
Age: 58
End: 2018-07-23

## 2018-07-23 LAB — GLUCOSE BLDC GLUCOMTR-MCNC: 149 MG/DL — HIGH (ref 70–99)

## 2018-07-25 PROBLEM — Z87.898 PERSONAL HISTORY OF OTHER SPECIFIED CONDITIONS: Chronic | Status: ACTIVE | Noted: 2018-07-12

## 2018-07-25 PROBLEM — K43.2 INCISIONAL HERNIA WITHOUT OBSTRUCTION OR GANGRENE: Chronic | Status: ACTIVE | Noted: 2018-07-12

## 2018-07-25 PROBLEM — Z86.69 PERSONAL HISTORY OF OTHER DISEASES OF THE NERVOUS SYSTEM AND SENSE ORGANS: Chronic | Status: ACTIVE | Noted: 2018-07-12

## 2018-07-31 ENCOUNTER — APPOINTMENT (OUTPATIENT)
Dept: SURGERY | Facility: CLINIC | Age: 58
End: 2018-07-31
Payer: COMMERCIAL

## 2018-07-31 VITALS
TEMPERATURE: 98.2 F | RESPIRATION RATE: 15 BRPM | OXYGEN SATURATION: 100 % | HEART RATE: 66 BPM | DIASTOLIC BLOOD PRESSURE: 78 MMHG | SYSTOLIC BLOOD PRESSURE: 132 MMHG

## 2018-07-31 PROCEDURE — 99024 POSTOP FOLLOW-UP VISIT: CPT

## 2018-07-31 RX ORDER — CLOTRIMAZOLE AND BETAMETHASONE DIPROPIONATE 10; .5 MG/G; MG/G
1-0.05 CREAM TOPICAL
Qty: 15 | Refills: 0 | Status: DISCONTINUED | COMMUNITY
Start: 2017-12-13 | End: 2018-07-31

## 2018-08-09 ENCOUNTER — APPOINTMENT (OUTPATIENT)
Dept: SURGERY | Facility: CLINIC | Age: 58
End: 2018-08-09
Payer: COMMERCIAL

## 2018-08-09 VITALS
WEIGHT: 195 LBS | HEART RATE: 63 BPM | BODY MASS INDEX: 32.49 KG/M2 | SYSTOLIC BLOOD PRESSURE: 114 MMHG | RESPIRATION RATE: 15 BRPM | OXYGEN SATURATION: 98 % | DIASTOLIC BLOOD PRESSURE: 78 MMHG | HEIGHT: 65 IN

## 2018-08-09 DIAGNOSIS — Z09 ENCOUNTER FOR FOLLOW-UP EXAMINATION AFTER COMPLETED TREATMENT FOR CONDITIONS OTHER THAN MALIGNANT NEOPLASM: ICD-10-CM

## 2018-08-09 PROCEDURE — 99024 POSTOP FOLLOW-UP VISIT: CPT

## 2018-11-11 ENCOUNTER — EMERGENCY (EMERGENCY)
Facility: HOSPITAL | Age: 58
LOS: 1 days | Discharge: HOME | End: 2018-11-11
Attending: EMERGENCY MEDICINE
Payer: COMMERCIAL

## 2018-11-11 VITALS
HEART RATE: 69 BPM | TEMPERATURE: 98 F | DIASTOLIC BLOOD PRESSURE: 89 MMHG | SYSTOLIC BLOOD PRESSURE: 118 MMHG | OXYGEN SATURATION: 95 % | WEIGHT: 190.04 LBS | HEIGHT: 65 IN | RESPIRATION RATE: 16 BRPM

## 2018-11-11 VITALS
TEMPERATURE: 98 F | HEART RATE: 64 BPM | DIASTOLIC BLOOD PRESSURE: 81 MMHG | SYSTOLIC BLOOD PRESSURE: 135 MMHG | OXYGEN SATURATION: 96 % | RESPIRATION RATE: 18 BRPM

## 2018-11-11 DIAGNOSIS — Z98.890 OTHER SPECIFIED POSTPROCEDURAL STATES: Chronic | ICD-10-CM

## 2018-11-11 DIAGNOSIS — Z90.710 ACQUIRED ABSENCE OF BOTH CERVIX AND UTERUS: Chronic | ICD-10-CM

## 2018-11-11 LAB
ALBUMIN SERPL ELPH-MCNC: 3.8 G/DL — SIGNIFICANT CHANGE UP (ref 3.3–5)
ALBUMIN SERPL ELPH-MCNC: 4.3 G/DL — SIGNIFICANT CHANGE UP (ref 3.3–5)
ALP SERPL-CCNC: 72 U/L — SIGNIFICANT CHANGE UP (ref 40–120)
ALP SERPL-CCNC: 84 U/L — SIGNIFICANT CHANGE UP (ref 40–120)
ALT FLD-CCNC: 32 U/L — SIGNIFICANT CHANGE UP (ref 10–45)
ALT FLD-CCNC: 34 U/L — SIGNIFICANT CHANGE UP (ref 10–45)
ANION GAP SERPL CALC-SCNC: 11 MMOL/L — SIGNIFICANT CHANGE UP (ref 5–17)
ANION GAP SERPL CALC-SCNC: 13 MMOL/L — SIGNIFICANT CHANGE UP (ref 5–17)
AST SERPL-CCNC: 21 U/L — SIGNIFICANT CHANGE UP (ref 10–40)
AST SERPL-CCNC: 53 U/L — HIGH (ref 10–40)
BASOPHILS # BLD AUTO: 0.1 K/UL — SIGNIFICANT CHANGE UP (ref 0–0.2)
BASOPHILS NFR BLD AUTO: 0.8 % — SIGNIFICANT CHANGE UP (ref 0–2)
BILIRUB SERPL-MCNC: 0.7 MG/DL — SIGNIFICANT CHANGE UP (ref 0.2–1.2)
BILIRUB SERPL-MCNC: 0.7 MG/DL — SIGNIFICANT CHANGE UP (ref 0.2–1.2)
BUN SERPL-MCNC: 10 MG/DL — SIGNIFICANT CHANGE UP (ref 7–23)
BUN SERPL-MCNC: 10 MG/DL — SIGNIFICANT CHANGE UP (ref 7–23)
CALCIUM SERPL-MCNC: 8.4 MG/DL — SIGNIFICANT CHANGE UP (ref 8.4–10.5)
CALCIUM SERPL-MCNC: 9.2 MG/DL — SIGNIFICANT CHANGE UP (ref 8.4–10.5)
CHLORIDE SERPL-SCNC: 100 MMOL/L — SIGNIFICANT CHANGE UP (ref 96–108)
CHLORIDE SERPL-SCNC: 103 MMOL/L — SIGNIFICANT CHANGE UP (ref 96–108)
CO2 SERPL-SCNC: 21 MMOL/L — LOW (ref 22–31)
CO2 SERPL-SCNC: 26 MMOL/L — SIGNIFICANT CHANGE UP (ref 22–31)
CREAT SERPL-MCNC: 0.58 MG/DL — SIGNIFICANT CHANGE UP (ref 0.5–1.3)
CREAT SERPL-MCNC: 0.78 MG/DL — SIGNIFICANT CHANGE UP (ref 0.5–1.3)
EOSINOPHIL # BLD AUTO: 0.3 K/UL — SIGNIFICANT CHANGE UP (ref 0–0.5)
EOSINOPHIL NFR BLD AUTO: 4.7 % — SIGNIFICANT CHANGE UP (ref 0–6)
GLUCOSE SERPL-MCNC: 211 MG/DL — HIGH (ref 70–99)
GLUCOSE SERPL-MCNC: 96 MG/DL — SIGNIFICANT CHANGE UP (ref 70–99)
HCT VFR BLD CALC: 39.5 % — SIGNIFICANT CHANGE UP (ref 34.5–45)
HGB BLD-MCNC: 13.2 G/DL — SIGNIFICANT CHANGE UP (ref 11.5–15.5)
LIDOCAIN IGE QN: 35 U/L — SIGNIFICANT CHANGE UP (ref 7–60)
LYMPHOCYTES # BLD AUTO: 3.3 K/UL — SIGNIFICANT CHANGE UP (ref 1–3.3)
LYMPHOCYTES # BLD AUTO: 47.9 % — HIGH (ref 13–44)
MCHC RBC-ENTMCNC: 29.3 PG — SIGNIFICANT CHANGE UP (ref 27–34)
MCHC RBC-ENTMCNC: 33.3 GM/DL — SIGNIFICANT CHANGE UP (ref 32–36)
MCV RBC AUTO: 88.1 FL — SIGNIFICANT CHANGE UP (ref 80–100)
MONOCYTES # BLD AUTO: 0.6 K/UL — SIGNIFICANT CHANGE UP (ref 0–0.9)
MONOCYTES NFR BLD AUTO: 8.2 % — SIGNIFICANT CHANGE UP (ref 2–14)
NEUTROPHILS # BLD AUTO: 2.6 K/UL — SIGNIFICANT CHANGE UP (ref 1.8–7.4)
NEUTROPHILS NFR BLD AUTO: 38.4 % — LOW (ref 43–77)
PLATELET # BLD AUTO: 226 K/UL — SIGNIFICANT CHANGE UP (ref 150–400)
POTASSIUM SERPL-MCNC: 3.9 MMOL/L — SIGNIFICANT CHANGE UP (ref 3.5–5.3)
POTASSIUM SERPL-MCNC: 5.9 MMOL/L — HIGH (ref 3.5–5.3)
POTASSIUM SERPL-SCNC: 3.9 MMOL/L — SIGNIFICANT CHANGE UP (ref 3.5–5.3)
POTASSIUM SERPL-SCNC: 5.9 MMOL/L — HIGH (ref 3.5–5.3)
PROT SERPL-MCNC: 6.7 G/DL — SIGNIFICANT CHANGE UP (ref 6–8.3)
PROT SERPL-MCNC: 6.9 G/DL — SIGNIFICANT CHANGE UP (ref 6–8.3)
RBC # BLD: 4.48 M/UL — SIGNIFICANT CHANGE UP (ref 3.8–5.2)
RBC # FLD: 12.4 % — SIGNIFICANT CHANGE UP (ref 10.3–14.5)
SODIUM SERPL-SCNC: 135 MMOL/L — SIGNIFICANT CHANGE UP (ref 135–145)
SODIUM SERPL-SCNC: 139 MMOL/L — SIGNIFICANT CHANGE UP (ref 135–145)
WBC # BLD: 6.9 K/UL — SIGNIFICANT CHANGE UP (ref 3.8–10.5)
WBC # FLD AUTO: 6.9 K/UL — SIGNIFICANT CHANGE UP (ref 3.8–10.5)

## 2018-11-11 PROCEDURE — 74177 CT ABD & PELVIS W/CONTRAST: CPT

## 2018-11-11 PROCEDURE — 85027 COMPLETE CBC AUTOMATED: CPT

## 2018-11-11 PROCEDURE — 85730 THROMBOPLASTIN TIME PARTIAL: CPT

## 2018-11-11 PROCEDURE — 36415 COLL VENOUS BLD VENIPUNCTURE: CPT

## 2018-11-11 PROCEDURE — 99284 EMERGENCY DEPT VISIT MOD MDM: CPT

## 2018-11-11 PROCEDURE — 74177 CT ABD & PELVIS W/CONTRAST: CPT | Mod: 26

## 2018-11-11 PROCEDURE — 85610 PROTHROMBIN TIME: CPT

## 2018-11-11 PROCEDURE — 83690 ASSAY OF LIPASE: CPT

## 2018-11-11 PROCEDURE — 99284 EMERGENCY DEPT VISIT MOD MDM: CPT | Mod: 25

## 2018-11-11 PROCEDURE — 80053 COMPREHEN METABOLIC PANEL: CPT

## 2018-11-11 RX ORDER — ACETAMINOPHEN 500 MG
975 TABLET ORAL ONCE
Qty: 0 | Refills: 0 | Status: COMPLETED | OUTPATIENT
Start: 2018-11-11 | End: 2018-11-11

## 2018-11-11 RX ORDER — SODIUM CHLORIDE 9 MG/ML
1000 INJECTION INTRAMUSCULAR; INTRAVENOUS; SUBCUTANEOUS ONCE
Qty: 0 | Refills: 0 | Status: COMPLETED | OUTPATIENT
Start: 2018-11-11 | End: 2018-11-11

## 2018-11-11 RX ADMIN — Medication 975 MILLIGRAM(S): at 12:00

## 2018-11-11 RX ADMIN — SODIUM CHLORIDE 1000 MILLILITER(S): 9 INJECTION INTRAMUSCULAR; INTRAVENOUS; SUBCUTANEOUS at 12:00

## 2018-11-11 NOTE — ED PROVIDER NOTE - OBJECTIVE STATEMENT
59 y/o female hx asthma, DM, HTN, HLD, who presents to the ED for 3-4 days of waxing and waning but progressive LLQ pain. patient states she has had episodes that last an hour, remain in the LLQ without radiation that are sharp and stabbing in nature, and resolve spontaneously after about an hour. patient reports no fever/chills/n/v/d. patient states sometimes the pain is worse with eating and sometimes it is unrelated. states she had a total hysterectomy 20 years ago for uterine cancer, and then a few months ago had an incisional hernia repair.

## 2018-11-11 NOTE — ED PROVIDER NOTE - PLAN OF CARE
Follow up with your Primary Care Physician within the next 2-3 days  Bring a copy of your test results with you to your appointment  Continue your current medication regimen  Return to the Emergency Room if you experience new or worsening symptoms  Follow up with your surgeon  Take Tylenol 650mg every 6 hrs as needed for pain.  Take Motrin 400-600mg every 6 hrs as needed for pain. Take with food

## 2018-11-11 NOTE — ED PROVIDER NOTE - PHYSICAL EXAMINATION
Attending Violette Cristina: Gen: NAD, heent: atrauamtic, eomi, perrla, mmm, op pink, uvula midline, neck; nttp, no nuchal rigidity, chest: nttp, no crepitus, cv: rrr, no murmurs, lungs: ctab, abd: soft, ttp LLQ nondistended, no peritoneal signs, +BS, no guarding, ext: wwp, neg homans, skin: no rash, neuro: awake and alert, following commands, speech clear, sensation and strength intact, no focal deficits

## 2018-11-11 NOTE — CONSULT NOTE ADULT - SUBJECTIVE AND OBJECTIVE BOX
GENERAL SURGERY CONSULT NOTE  Attending: Mame  Service: [facility]  Contact: p [phone]    HPI  57 y/o female hx asthma, DM, HTN, HLD, who presents to the ED for 3-4 days of waxing and waning but progressive LLQ pain. Patient states she has had episodes that last an hour, remain in the LLQ without radiation that are sharp and stabbing in nature, and resolve spontaneously after about an hour. Patient denies any fever/chills/n/v/d. She states pain is unrelated to eating. She has had a total hysterectomy 20 years ago for uterine cancer, and then a few months ago had an incisional hernia repair with mesh.      In the ER she was hemodynamically stable and afebrile.  Her labs were largely unremarkable.  On exam she has minor pain to deep palpation in the left mid abdomen. CTAP revealed a small anterior abdominal wall fluid collection, likely a seroma in area of her old hernia repair.     PMH/PSH  History of chest pain  History of Bell's palsy  Incisional hernia without obstruction or gangrene  Asthma  Hypertension  HLD (hyperlipidemia)  Transient ischemic attack  Vitamin D deficiency  Pneumonia  Seasonal allergies  GERD (gastroesophageal reflux disease)  Uterine Cancer  Diabetes Mellitus    H/O carpal tunnel repair  H/O total hysterectomy with bilateral salpingo-oophorectomy (BSO)  History of Hernia Repair  H/O: Hysterectomy      MEDICATIONS  acetaminophen   Tablet .. 975 milliGRAM(s) Oral Once  sodium chloride 0.9% Bolus 1000 milliLiter(s) IV Bolus once      Allergies    No Known Allergies    Intolerances        Social: non smoker, no ETOH    Physical Exam  T(C): 36.7 (18 @ 11:30), Max: 36.7 (18 @ 11:30)  HR: 69 (18 @ 11:30) (69 - 69)  BP: 118/89 (18 @ 11:30) (118/89 - 118/89)  RR: 16 (18 @ 11:30) (16 - 16)  SpO2: 95% (18 @ 11:30) (95% - 95%)  Wt(kg): --  Tmax: T(C): , Max: 36.7 (18 @ 11:30)  Wt(kg): --      Gen: NAD  Neuro: AAOx3  HEENT: normocephalic, atraumatic, no scleral icterus  CV: S1, S2, RRR  Pulm: CTA B/L  Abd: Obese, soft, ND, mildly tender in left hemiabdomen without rebound or guarding. States improved after tylenol  Ext: warm, no edema    LABS                        13.2   6.9   )-----------( 226      ( 2018 17:34 )             39.5         137  |  99  |  12  ----------------------------<  128<H>  6.0<H>   |  28  |  0.70    Ca    9.8      2018 13:53    TPro  7.8  /  Alb  4.5  /  TBili  0.7  /  DBili  x   /  AST  56<H>  /  ALT  40  /  AlkPhos  85      PT/INR - ( 2018 13:52 )   PT: 10.7 sec;   INR: 0.94 ratio         PTT - ( 2018 13:52 )  PTT:30.5 sec  Urinalysis Basic - ( 2018 13:54 )    Color: Dark Yellow / Appearance: Clear / S.041 / pH: x  Gluc: x / Ketone: Negative  / Bili: Negative / Urobili: Negative   Blood: x / Protein: Trace / Nitrite: Negative   Leuk Esterase: Negative / RBC: x / WBC x   Sq Epi: x / Non Sq Epi: x / Bacteria: x            IMAGING  < from: CT Abdomen and Pelvis w/ Oral Cont and w/ IV Cont (18 @ 16:45) >  EXAM:  CT ABDOMEN AND PELVIS OC IC                            PROCEDURE DATE:  2018            INTERPRETATION:  CLINICAL INFORMATION: Left lower quadrant abdominal pain.    COMPARISON: CT of the abdomen pelvis dated 2018.    PROCEDURE:   CT of the Abdomen and Pelvis was performed with intravenous contrast.   Intravenous contrast: 90 ml Omnipaque 350. 10 ml discarded.  Oral contrast: positive contrast was administered.  Sagittal and coronal reformats were performed.    FINDINGS:    LOWER CHEST: Within normal limits.    LIVER: Within normal limits.  BILE DUCTS: Normal caliber.  GALLBLADDER: Cholelithiasis.  SPLEEN: Within normal limits.  PANCREAS: Within normal limits.  ADRENALS: Within normal limits.  KIDNEYS/URETERS: Within normal limits.    BLADDER: Within normal limits.  REPRODUCTIVE ORGANS: Status post hysterectomy. No adnexal masses.    BOWEL: No bowel obstruction.   PERITONEUM: No ascites.  VESSELS:  Within normal limits.  RETROPERITONEUM: No lymphadenopathy.    ABDOMINAL WALL: Status post left lower anterior abdominal wall hernia   repair. A 5.7 x 3.6 cm fluid collection at the site of a prior hernia in   the left anterior abdominal wall, likely seroma.   BONES: Mild degenerative changes.    IMPRESSION:     No acuteintra-abdominal pathology.    Left anterior abdominal wall fluid collection at site of a prior hernia,   likely a seroma.        < end of copied text >

## 2018-11-11 NOTE — ED ADULT NURSE NOTE - OBJECTIVE STATEMENT
pt ambulatory to room 21 c/o intermittant abd pain that waxes and wane pt with h/o ovarain  ca with hysterectomy . pt also with h/o hernia .Abs soft with firm tender area.on L side . pt denies fever chills denies urinary symptoms IV placed R ac labs sent Pt med with tylenol with relief. pt tolerated ct oral contrast.

## 2018-11-11 NOTE — ED PROVIDER NOTE - MEDICAL DECISION MAKING DETAILS
Attending Violette Cristina: 59 y/o female presenting with LLQ pain. concern for possible seroma to site of previous surgeries, vs acute colitis. pt it ttp on exam. will obtain labs and ct abd/pel. no dysuria

## 2018-11-11 NOTE — ED PROVIDER NOTE - CARE PLAN
Principal Discharge DX:	Seroma of skin or subcutaneous tissue after non-dermatologic procedure  Assessment and plan of treatment:	Follow up with your Primary Care Physician within the next 2-3 days  Bring a copy of your test results with you to your appointment  Continue your current medication regimen  Return to the Emergency Room if you experience new or worsening symptoms  Follow up with your surgeon  Take Tylenol 650mg every 6 hrs as needed for pain.  Take Motrin 400-600mg every 6 hrs as needed for pain. Take with food

## 2018-11-11 NOTE — CONSULT NOTE ADULT - ASSESSMENT
64F with abdominal pain s/p incisional hernia repair in July of this year now with small seroma     - No acute surgical intervention required  - patient may follow up as an outpatient in the office   - Tylenol motrin for pain control    D/W Dr. Vilchis    [facility] R3 x [phone]

## 2018-11-11 NOTE — ED PROVIDER NOTE - PROGRESS NOTE DETAILS
surgery resident was made aware of the patient by her attending, came and saw patient and cleared for dc. patient feeling improved. understands return precautions.

## 2018-11-11 NOTE — ED ADULT NURSE NOTE - NSIMPLEMENTINTERV_GEN_ALL_ED
Implemented All Universal Safety Interventions:  [place] to call system. Call bell, personal items and telephone within reach. Instruct patient to call for assistance. Room bathroom lighting operational. Non-slip footwear when patient is off stretcher. Physically safe environment: no spills, clutter or unnecessary equipment. Stretcher in lowest position, wheels locked, appropriate side rails in place.

## 2018-12-06 NOTE — ED ADULT NURSE NOTE - THOUGHTS OF HOMICIDE/VIOLENCE TOWARDS OTHERS YN, MLM
Patient Instructions by Dedrick Fishman MD at 11/20/17 12:29 PM     Author:  Dedrick Fishman MD Service:  (none) Author Type:  Physician     Filed:  11/20/17 12:29 PM Encounter Date:  11/20/2017 Status:  Signed     :  Dedrick Fishman MD (Physician)            PATIENT INFORMATION       Low fat low cholesterol diet and low salt intake is recommended.   Avoid excessive caffeine.  Ensure adequate intake of calcium and vitamin D  If possible, exercise is recommended 45 minutes per day.     -- Fasting labwork has been ordered for you. General patient information when having a lab test:  · Fasting - No caloric intake (WATER IS OKAY) for a minimum of 8-10 hours before your blood draw:  · Tests that commonly require fasting are:  · Cholesterol (lipid panel)  · Basic chemistry panel  · Comprehensive chemistry panel  · Glucose (blood sugar)   · Medicine - You can take any prescribed or routine medicine during your fast.  · Brushing Teeth - You can brush your teeth even if you are fasting.  Getting Your Results - Your doctor’s office will notify you of your results within 10 working days.    Follow-Up  -- Make an appointment with Dedrick Fishman MD in three months    Do not hesitate to call if you have any matter that concerns you.     You may get a survey in the mail.   I would request you to please take time to fill it and give us your feedback on your experience with me.   Thanks for choosing me to provide your health care today.    Additional Educational Resources:  For additional resources regarding your symptoms, diagnosis, or further health information, please visit the Health Resources section on [email or web address] or the Online Health Resources section in [email or web address].                      Revision History        User Key Date/Time User Provider Type Action    > [N/A] 11/20/17 12:29 PM Dedrick Fishman MD Physician Sign             No

## 2019-01-01 ENCOUNTER — INPATIENT (INPATIENT)
Facility: HOSPITAL | Age: 59
LOS: 0 days | Discharge: HOME | DRG: 203 | End: 2019-01-01
Attending: STUDENT IN AN ORGANIZED HEALTH CARE EDUCATION/TRAINING PROGRAM | Admitting: STUDENT IN AN ORGANIZED HEALTH CARE EDUCATION/TRAINING PROGRAM
Payer: COMMERCIAL

## 2019-01-01 ENCOUNTER — TRANSCRIPTION ENCOUNTER (OUTPATIENT)
Age: 59
End: 2019-01-01

## 2019-01-01 VITALS
SYSTOLIC BLOOD PRESSURE: 124 MMHG | HEART RATE: 86 BPM | TEMPERATURE: 98 F | OXYGEN SATURATION: 97 % | DIASTOLIC BLOOD PRESSURE: 70 MMHG | RESPIRATION RATE: 20 BRPM

## 2019-01-01 VITALS
OXYGEN SATURATION: 92 % | DIASTOLIC BLOOD PRESSURE: 83 MMHG | HEART RATE: 98 BPM | SYSTOLIC BLOOD PRESSURE: 137 MMHG | WEIGHT: 184.97 LBS | RESPIRATION RATE: 18 BRPM | TEMPERATURE: 99 F

## 2019-01-01 DIAGNOSIS — Z90.710 ACQUIRED ABSENCE OF BOTH CERVIX AND UTERUS: Chronic | ICD-10-CM

## 2019-01-01 DIAGNOSIS — J45.901 UNSPECIFIED ASTHMA WITH (ACUTE) EXACERBATION: ICD-10-CM

## 2019-01-01 DIAGNOSIS — G45.9 TRANSIENT CEREBRAL ISCHEMIC ATTACK, UNSPECIFIED: ICD-10-CM

## 2019-01-01 DIAGNOSIS — Z98.890 OTHER SPECIFIED POSTPROCEDURAL STATES: Chronic | ICD-10-CM

## 2019-01-01 DIAGNOSIS — E11.9 TYPE 2 DIABETES MELLITUS WITHOUT COMPLICATIONS: ICD-10-CM

## 2019-01-01 DIAGNOSIS — J45.909 UNSPECIFIED ASTHMA, UNCOMPLICATED: ICD-10-CM

## 2019-01-01 LAB
ALBUMIN SERPL ELPH-MCNC: 4.7 G/DL — SIGNIFICANT CHANGE UP (ref 3.3–5)
ALP SERPL-CCNC: 98 U/L — SIGNIFICANT CHANGE UP (ref 40–120)
ALT FLD-CCNC: 33 U/L — SIGNIFICANT CHANGE UP (ref 10–45)
ANION GAP SERPL CALC-SCNC: 19 MMOL/L — HIGH (ref 5–17)
AST SERPL-CCNC: 23 U/L — SIGNIFICANT CHANGE UP (ref 10–40)
BASOPHILS # BLD AUTO: 0.1 K/UL — SIGNIFICANT CHANGE UP (ref 0–0.2)
BASOPHILS NFR BLD AUTO: 0.7 % — SIGNIFICANT CHANGE UP (ref 0–2)
BILIRUB SERPL-MCNC: 0.4 MG/DL — SIGNIFICANT CHANGE UP (ref 0.2–1.2)
BUN SERPL-MCNC: 12 MG/DL — SIGNIFICANT CHANGE UP (ref 7–23)
CALCIUM SERPL-MCNC: 9.6 MG/DL — SIGNIFICANT CHANGE UP (ref 8.4–10.5)
CHLORIDE SERPL-SCNC: 98 MMOL/L — SIGNIFICANT CHANGE UP (ref 96–108)
CO2 SERPL-SCNC: 20 MMOL/L — LOW (ref 22–31)
CREAT SERPL-MCNC: 0.73 MG/DL — SIGNIFICANT CHANGE UP (ref 0.5–1.3)
EOSINOPHIL # BLD AUTO: 0 K/UL — SIGNIFICANT CHANGE UP (ref 0–0.5)
EOSINOPHIL NFR BLD AUTO: 0.5 % — SIGNIFICANT CHANGE UP (ref 0–6)
GLUCOSE SERPL-MCNC: 266 MG/DL — HIGH (ref 70–99)
HCT VFR BLD CALC: 44.7 % — SIGNIFICANT CHANGE UP (ref 34.5–45)
HGB BLD-MCNC: 15.1 G/DL — SIGNIFICANT CHANGE UP (ref 11.5–15.5)
LYMPHOCYTES # BLD AUTO: 3.5 K/UL — HIGH (ref 1–3.3)
LYMPHOCYTES # BLD AUTO: 40.1 % — SIGNIFICANT CHANGE UP (ref 13–44)
MCHC RBC-ENTMCNC: 30 PG — SIGNIFICANT CHANGE UP (ref 27–34)
MCHC RBC-ENTMCNC: 33.8 GM/DL — SIGNIFICANT CHANGE UP (ref 32–36)
MCV RBC AUTO: 88.6 FL — SIGNIFICANT CHANGE UP (ref 80–100)
MONOCYTES # BLD AUTO: 0.2 K/UL — SIGNIFICANT CHANGE UP (ref 0–0.9)
MONOCYTES NFR BLD AUTO: 2.7 % — SIGNIFICANT CHANGE UP (ref 2–14)
NEUTROPHILS # BLD AUTO: 4.8 K/UL — SIGNIFICANT CHANGE UP (ref 1.8–7.4)
NEUTROPHILS NFR BLD AUTO: 56 % — SIGNIFICANT CHANGE UP (ref 43–77)
PLATELET # BLD AUTO: 282 K/UL — SIGNIFICANT CHANGE UP (ref 150–400)
POTASSIUM SERPL-MCNC: 4 MMOL/L — SIGNIFICANT CHANGE UP (ref 3.5–5.3)
POTASSIUM SERPL-SCNC: 4 MMOL/L — SIGNIFICANT CHANGE UP (ref 3.5–5.3)
PROT SERPL-MCNC: 7.9 G/DL — SIGNIFICANT CHANGE UP (ref 6–8.3)
RAPID RVP RESULT: SIGNIFICANT CHANGE UP
RBC # BLD: 5.05 M/UL — SIGNIFICANT CHANGE UP (ref 3.8–5.2)
RBC # FLD: 12.1 % — SIGNIFICANT CHANGE UP (ref 10.3–14.5)
SODIUM SERPL-SCNC: 137 MMOL/L — SIGNIFICANT CHANGE UP (ref 135–145)
WBC # BLD: 8.6 K/UL — SIGNIFICANT CHANGE UP (ref 3.8–10.5)
WBC # FLD AUTO: 8.6 K/UL — SIGNIFICANT CHANGE UP (ref 3.8–10.5)

## 2019-01-01 PROCEDURE — 99285 EMERGENCY DEPT VISIT HI MDM: CPT | Mod: 25

## 2019-01-01 PROCEDURE — 96375 TX/PRO/DX INJ NEW DRUG ADDON: CPT

## 2019-01-01 PROCEDURE — 87798 DETECT AGENT NOS DNA AMP: CPT

## 2019-01-01 PROCEDURE — 71045 X-RAY EXAM CHEST 1 VIEW: CPT

## 2019-01-01 PROCEDURE — 87633 RESP VIRUS 12-25 TARGETS: CPT

## 2019-01-01 PROCEDURE — 84484 ASSAY OF TROPONIN QUANT: CPT

## 2019-01-01 PROCEDURE — 82962 GLUCOSE BLOOD TEST: CPT

## 2019-01-01 PROCEDURE — 87486 CHLMYD PNEUM DNA AMP PROBE: CPT

## 2019-01-01 PROCEDURE — 93005 ELECTROCARDIOGRAM TRACING: CPT

## 2019-01-01 PROCEDURE — 99223 1ST HOSP IP/OBS HIGH 75: CPT

## 2019-01-01 PROCEDURE — 87581 M.PNEUMON DNA AMP PROBE: CPT

## 2019-01-01 PROCEDURE — 71045 X-RAY EXAM CHEST 1 VIEW: CPT | Mod: 26

## 2019-01-01 PROCEDURE — 80053 COMPREHEN METABOLIC PANEL: CPT

## 2019-01-01 PROCEDURE — 96374 THER/PROPH/DIAG INJ IV PUSH: CPT

## 2019-01-01 PROCEDURE — 85027 COMPLETE CBC AUTOMATED: CPT

## 2019-01-01 PROCEDURE — 94640 AIRWAY INHALATION TREATMENT: CPT

## 2019-01-01 RX ORDER — MONTELUKAST 4 MG/1
10 TABLET, CHEWABLE ORAL DAILY
Qty: 0 | Refills: 0 | Status: DISCONTINUED | OUTPATIENT
Start: 2019-01-01 | End: 2019-01-01

## 2019-01-01 RX ORDER — IPRATROPIUM/ALBUTEROL SULFATE 18-103MCG
3 AEROSOL WITH ADAPTER (GRAM) INHALATION ONCE
Qty: 0 | Refills: 0 | Status: COMPLETED | OUTPATIENT
Start: 2019-01-01 | End: 2019-01-01

## 2019-01-01 RX ORDER — UBIDECARENONE 100 MG
1 CAPSULE ORAL
Qty: 0 | Refills: 0 | COMMUNITY

## 2019-01-01 RX ORDER — GLUCAGON INJECTION, SOLUTION 0.5 MG/.1ML
1 INJECTION, SOLUTION SUBCUTANEOUS ONCE
Qty: 0 | Refills: 0 | Status: DISCONTINUED | OUTPATIENT
Start: 2019-01-01 | End: 2019-01-01

## 2019-01-01 RX ORDER — MAGNESIUM SULFATE 500 MG/ML
2 VIAL (ML) INJECTION ONCE
Qty: 0 | Refills: 0 | Status: COMPLETED | OUTPATIENT
Start: 2019-01-01 | End: 2019-01-01

## 2019-01-01 RX ORDER — IPRATROPIUM/ALBUTEROL SULFATE 18-103MCG
3 AEROSOL WITH ADAPTER (GRAM) INHALATION
Qty: 0 | Refills: 0 | Status: COMPLETED | OUTPATIENT
Start: 2019-01-01 | End: 2019-01-01

## 2019-01-01 RX ORDER — IBUPROFEN 200 MG
1 TABLET ORAL
Qty: 0 | Refills: 0 | COMMUNITY

## 2019-01-01 RX ORDER — IPRATROPIUM/ALBUTEROL SULFATE 18-103MCG
3 AEROSOL WITH ADAPTER (GRAM) INHALATION EVERY 4 HOURS
Qty: 0 | Refills: 0 | Status: DISCONTINUED | OUTPATIENT
Start: 2019-01-01 | End: 2019-01-01

## 2019-01-01 RX ORDER — POLYETHYLENE GLYCOL 3350 17 G/17G
1 POWDER, FOR SOLUTION ORAL
Qty: 0 | Refills: 0 | COMMUNITY

## 2019-01-01 RX ORDER — DEXTROSE 50 % IN WATER 50 %
25 SYRINGE (ML) INTRAVENOUS ONCE
Qty: 0 | Refills: 0 | Status: DISCONTINUED | OUTPATIENT
Start: 2019-01-01 | End: 2019-01-01

## 2019-01-01 RX ORDER — CHOLECALCIFEROL (VITAMIN D3) 125 MCG
1 CAPSULE ORAL
Qty: 0 | Refills: 0 | COMMUNITY

## 2019-01-01 RX ORDER — DEXTROSE 50 % IN WATER 50 %
15 SYRINGE (ML) INTRAVENOUS ONCE
Qty: 0 | Refills: 0 | Status: DISCONTINUED | OUTPATIENT
Start: 2019-01-01 | End: 2019-01-01

## 2019-01-01 RX ORDER — INSULIN LISPRO 100/ML
VIAL (ML) SUBCUTANEOUS AT BEDTIME
Qty: 0 | Refills: 0 | Status: DISCONTINUED | OUTPATIENT
Start: 2019-01-01 | End: 2019-01-01

## 2019-01-01 RX ORDER — METFORMIN HYDROCHLORIDE 850 MG/1
0 TABLET ORAL
Qty: 0 | Refills: 0 | COMMUNITY

## 2019-01-01 RX ORDER — DEXTROSE 50 % IN WATER 50 %
12.5 SYRINGE (ML) INTRAVENOUS ONCE
Qty: 0 | Refills: 0 | Status: DISCONTINUED | OUTPATIENT
Start: 2019-01-01 | End: 2019-01-01

## 2019-01-01 RX ORDER — SODIUM CHLORIDE 9 MG/ML
1000 INJECTION INTRAMUSCULAR; INTRAVENOUS; SUBCUTANEOUS ONCE
Qty: 0 | Refills: 0 | Status: COMPLETED | OUTPATIENT
Start: 2019-01-01 | End: 2019-01-01

## 2019-01-01 RX ORDER — ACETAMINOPHEN 500 MG
650 TABLET ORAL EVERY 6 HOURS
Qty: 0 | Refills: 0 | Status: DISCONTINUED | OUTPATIENT
Start: 2019-01-01 | End: 2019-01-01

## 2019-01-01 RX ORDER — SODIUM CHLORIDE 9 MG/ML
1000 INJECTION, SOLUTION INTRAVENOUS
Qty: 0 | Refills: 0 | Status: DISCONTINUED | OUTPATIENT
Start: 2019-01-01 | End: 2019-01-01

## 2019-01-01 RX ORDER — INSULIN LISPRO 100/ML
VIAL (ML) SUBCUTANEOUS
Qty: 0 | Refills: 0 | Status: DISCONTINUED | OUTPATIENT
Start: 2019-01-01 | End: 2019-01-01

## 2019-01-01 RX ORDER — PHYTONADIONE (VIT K1) 5 MG
1 TABLET ORAL
Qty: 0 | Refills: 0 | COMMUNITY

## 2019-01-01 RX ORDER — IPRATROPIUM/ALBUTEROL SULFATE 18-103MCG
3 AEROSOL WITH ADAPTER (GRAM) INHALATION EVERY 6 HOURS
Qty: 0 | Refills: 0 | Status: DISCONTINUED | OUTPATIENT
Start: 2019-01-01 | End: 2019-01-01

## 2019-01-01 RX ADMIN — Medication 40 MILLIGRAM(S): at 12:03

## 2019-01-01 RX ADMIN — Medication 650 MILLIGRAM(S): at 13:00

## 2019-01-01 RX ADMIN — SODIUM CHLORIDE 1000 MILLILITER(S): 9 INJECTION INTRAMUSCULAR; INTRAVENOUS; SUBCUTANEOUS at 01:11

## 2019-01-01 RX ADMIN — Medication 80 MILLIGRAM(S): at 01:18

## 2019-01-01 RX ADMIN — Medication 650 MILLIGRAM(S): at 12:04

## 2019-01-01 RX ADMIN — Medication 3 MILLILITER(S): at 01:18

## 2019-01-01 RX ADMIN — Medication 20 MILLIGRAM(S): at 01:50

## 2019-01-01 RX ADMIN — Medication 50 GRAM(S): at 01:50

## 2019-01-01 RX ADMIN — Medication 2: at 17:49

## 2019-01-01 RX ADMIN — MONTELUKAST 10 MILLIGRAM(S): 4 TABLET, CHEWABLE ORAL at 12:00

## 2019-01-01 RX ADMIN — Medication 3 MILLILITER(S): at 01:50

## 2019-01-01 RX ADMIN — Medication 3 MILLILITER(S): at 07:17

## 2019-01-01 RX ADMIN — Medication 3 MILLILITER(S): at 17:49

## 2019-01-01 RX ADMIN — Medication 3: at 13:23

## 2019-01-01 RX ADMIN — Medication 3 MILLILITER(S): at 12:00

## 2019-01-01 NOTE — ED ADULT NURSE REASSESSMENT NOTE - NS ED NURSE REASSESS COMMENT FT1
Currently admitted awaiting inpatient bed placement.
Patient a&ox3, no acute distress, resp nonlabored, resting comfortably in bed.  States "I feel better".  Denies headache, dizziness, chest pain, palpitations, SOB, abd pain, n/v/d, urinary symptoms, fevers, chills, weakness at this time. Patient awaiting MD reassessment. Safety maintained.
Received female alert and oriented x3 in no apparent resp. distress. Pt reports that she feels much better and is no longer experiencing SOB. Vitals signs stable; awaiting bed assignment.

## 2019-01-01 NOTE — DISCHARGE NOTE ADULT - CARE PROVIDER_API CALL
Isabelle Malhotra), Family Medicine  [address]  [address] [place] [address]  Phone: [phone]  Fax: [phone]

## 2019-01-01 NOTE — ED ADULT NURSE NOTE - NSIMPLEMENTINTERV_GEN_ALL_ED
Implemented All Universal Safety Interventions:  [place] to call system. Call bell, personal items and telephone within reach. Instruct patient to call for assistance. Room bathroom lighting operational. Non-slip footwear when patient is off stretcher. Physically safe environment: no spills, clutter or unnecessary equipment. Stretcher in lowest position, wheels locked, appropriate side rails in place.

## 2019-01-01 NOTE — DISCHARGE NOTE ADULT - PLAN OF CARE
no further episodes and to prevent any complication -Continue Prednisone 40mg x 5 days   -Pulmonary Follow up with in 1 week   -Primary Care Physician Follow up in 1 week   SEEK MEDICAL CARE IF:  You have wheezing, shortness of breath (even at rest), or a cough even if taking medicine to prevent attacks.   You have difficulty eating, drinking, or talking due to asthma symptoms.  You have chest pain or you feel that your heart is beating fast.   You are lightheaded, dizzy, or faint.  You seem to be getting worse and are unresponsive to treatment during an asthma attack.  You are using a reliever medicine more than 2–3 times per week. Continue home medications   Make sure you get your HgA1c checked every three months.  If you take oral diabetes medications, check your blood glucose two times a day.  If you take insulin, check your blood glucose before meals and at bedtime.  It's important not to skip any meals.  Keep a log of your blood glucose results and always take it with you to your doctor appointments.  Keep a list of your current medications including injectables and over the counter medications and bring this medication list with you to all your doctor appointments.  If you have not seen your opthalmologist this year call for appointment.  Check your feet daily for redness, sores, or openings. Do not self treat. If no improvement in two days call your primary care physician for an appointment.

## 2019-01-01 NOTE — DISCHARGE NOTE ADULT - HOSPITAL COURSE
59 yo f with asthma, DM2, CVA no residual deficits presented with SOB, wheezing for 4 days. Patient had a bout of PNA 4 years ago and ever since then she developed asthma. Gets some attacks during winter seasons but never hospitalized or intubated. Uses inhalers as needed. Since 4 days ago began having more severe symptoms, went to see her PMD yesterday and got some nebs, steroid but didnt improved. Patient denies any fever, chills, cough, CP, palpitations. no recent travel or sick contacts. In the ED, got few rounds of nebs, steroid, Mg with significant symptom improvement. Nebs does cause her some palpitations. PT noted to have clinically improved. Placed on Prednisone 40mg daily for 5 days and Cont with nebs ATC, supportive. PT deemed medically stable and cleared for discharge by Attending, Dr Mayorga. Medicine Reconcilation reviewed, revised, and edited. Outpatient follow up advised with her Primary Care and Pulmonary Physician in 1 week. 59 yo f with asthma, DM2, CVA no residual deficits presented with SOB, wheezing for 4 days. Patient had a bout of PNA 4 years ago and ever since then she developed asthma. Gets some attacks during winter seasons but never hospitalized or intubated. Uses inhalers as needed. Since 4 days ago began having more severe symptoms, went to see her PMD yesterday and got some nebs, steroid but didnt improved. Patient denies any fever, chills, cough, CP, palpitations. no recent travel or sick contacts. In the ED, got few rounds of nebs, steroid, Mg with significant symptom improvement. RVP negative. CXR- clear lungs. PT noted to have clinically improved. Placed on Prednisone 40mg daily for 5 days and Cont with nebs ATC, supportive. PT deemed medically stable and cleared for discharge by Attending, Dr Mayorga. Medicine Reconciliation reviewed, revised, and edited. Outpatient follow up advised with her Primary Care and Pulmonary Physician in 1 week.

## 2019-01-01 NOTE — ED PROVIDER NOTE - MEDICAL DECISION MAKING DETAILS
58 Y F with hx of asthma being treated outpatient as of today with steroids and abx without improvement with subjective SOB found to have diffuse wheeze on exam. Will give more steroids, RVP, troponin given chest pain, CXR, duoneb, likely admit if not improved. Chet Deysi DO: 59 yo F pmh DM, CVA, asthma with asthma exacerbation worsening today. Was started by PCP on abx/pred 40mg in the evening, but pt stll feeling short of breath. minimal air movement on exam. ekg similar to prior. doubt VTE. additional  steroid/nebs given. labs without significant abnormality. RVP neg. CXR neg for PNA/PTX. Pt with minimal improvement. will admit for cont management.

## 2019-01-01 NOTE — DISCHARGE NOTE ADULT - ADDITIONAL INSTRUCTIONS
-Follow with your Primary care Physician in 1 week to review your hospital course  -complete steroids until finished for a total course of 5 days   -Bring all discharge documents and prescriptions with you at the time of your appointments   -You may return to the Emergency department for any worsening or return of your symptoms

## 2019-01-01 NOTE — DISCHARGE NOTE ADULT - MEDICATION SUMMARY - MEDICATIONS TO TAKE
I will START or STAY ON the medications listed below when I get home from the hospital:    predniSONE 20 mg oral tablet  -- 2 tab(s) by mouth once a day  -- Indication: For Asthma with acute exacerbation, unspecified asthma severity, unspecified whether persistent    metFORMIN 1000 mg oral tablet  -- 1 tab(s) by mouth 2 times a day  -- Indication: For Type 2 diabetes mellitus without complication, without long-term current use of insulin    albuterol 90 mcg/inh inhalation aerosol  -- 2 puff(s) inhaled 4 times a day, As Needed  -- Indication: For Asthma with acute exacerbation, unspecified asthma severity, unspecified whether persistent    Symbicort 160 mcg-4.5 mcg/inh inhalation aerosol  -- 2 puff(s) inhaled 2 times a day  -- Indication: For Asthma with acute exacerbation, unspecified asthma severity, unspecified whether persistent    Spiriva Respimat 28 ACT 2.5 mcg/inh inhalation aerosol  -- 2 puff(s) inhaled once a day  -- Indication: For Asthma with acute exacerbation, unspecified asthma severity, unspecified whether persistent    Singulair 10 mg oral tablet  -- 1 tab(s) by mouth once a day  -- Indication: For Asthma

## 2019-01-01 NOTE — DISCHARGE NOTE ADULT - PATIENT PORTAL LINK FT
You can access the [facility] Patient Portal, offered by [facility], by registering with the following website: [email or web address]

## 2019-01-01 NOTE — ED ADULT NURSE NOTE - OBJECTIVE STATEMENT
59 y/o female pmhx asthma, DM, CVA presenting to ED  with SOB x 3-4 days. Pt states she has had increased wheezing daily, and saw her PMD today who gave steroids, and antibiotics. Pt states symptoms have worsened since leaving her PMD so she decided to come to the ED for evaluation.  Pt. endorses mild CP, and denies headache, dizziness, palpitations, abdominal pain, n/v/d, urinary symptoms, fevers, chills, weakness at this time. A&Ox4 gross neuro intact, lungs have wheezes present bilaterally, moderate difficulty speaking in complete sentences, s1s2 heart sounds heard, pulses x 4, casas x4, abdomen soft nontender nondistended, skin intact. Safety and comfort measures maintained. Patient undressed and placed into gown, call bell in hand and side rails up for safety. warm blanket provided, vital signs stable, pt in no acute distress.

## 2019-01-01 NOTE — H&P ADULT - PROBLEM SELECTOR PLAN 1
S/p IV steroid, nebs, Mg in ed  Will start on Prednisone 40mg daily for 5 days   Cont with nebs ATC, supportive

## 2019-01-01 NOTE — ED PROVIDER NOTE - OBJECTIVE STATEMENT
58 Y F with hx of Asthma, DM, CVA who presents with increasing SOB and wheezing since sat. Pt sattes that she saw her dr today for this and was given steroids and abx. She got worse this afternoon and decided to come to the ED. She admits to mild chest pain since sat as well in addition to her SOB. She admits to mild cough but denies fever. She denies any LE swelling, recent travel or sx. 58 Y F with hx of Asthma, DM, CVA who presents with increasing SOB and wheezing since sat. Pt states that she saw her dr today for this and was given steroids and abx. She got worse this afternoon and decided to come to the ED. She admits to mild chest pain since Saturday as well in addition to her SOB. She admits to mild cough but denies fever. She denies any LE swelling, recent travel or sx.

## 2019-07-24 NOTE — DISCHARGE NOTE ADULT - MEDICATION SUMMARY - MEDICATIONS TO CHANGE
I will SWITCH the dose or number of times a day I take the medications listed below when I get home from the hospital:  None
independent

## 2019-08-20 NOTE — ED CDU PROVIDER NOTE - NSSUBSTANCEUSE_GEN_ALL_CORE_SD
Your current Orthopaedic problem we are working together to treat is:  Right Total Knee Replacement    A refill for Voltaren gel has been sent to your preferred pharmacy.     It is recommended you schedule a follow-up appointment with Kel Singh MD  4 months after surgery.      Office hours are 8:00 am to 5:00 pm Monday through Friday. If it is urgent that you speak with someone outside of these hours, our [facility] Call Center will be able to assist you. You can reach the office by calling the [place] central scheduling line at [phone]    We are constantly looking at ways to improve the care we provide, you might receive a survey by mail or email and we (I) would appreciate if you would take a few minutes and complete.  Your feedback is important and helps us know how we are doing. We really appreciate it.     Thank you for choosing [facility] as your Orthopaedic provider!    
never used

## 2019-08-26 NOTE — ED CLERICAL - NS ED CLERK NOTE PRE-ARRIVAL INFOMATION; PCP NAME
[facility]  [address]  [place], [address]  [phone] fax    Plan of Care/ Statement of Necessity for Physical Therapy Services    Patient name: Skylar Julio Start of Care: 2019   Referral source: Russell Lau MD : 1973    Medical Diagnosis: Pain in right hip [M25.551]  Payor: [employer]oman[employer] / Plan: Yonathan Ojeda [id number] PPO / Product Type: PPO /  Onset Date:5-6 months    Treatment Diagnosis: Right hip pain   Prior Hospitalization: see medical history Provider#: [id number]   Medications: Verified on Patient summary List    Comorbidities: Cervical fusion 2016   Prior Level of Function: Pt able to perform daily tasks and recreational walking without hip pain     The Plan of Care and following information is based on the information from the initial evaluation. Assessment/ key information: Pt is a 49 y/o F presenting with c/o right groin pain for the past 5-6 months. She is unsure of mechanism but state she was moving about the time of onset. She reports limited right hip mobility especially into MADDI, with associated groin pain. No provocation reports with [address] activities. Pt demonstrates pain limited right hip ER/IR with hip flexion, hip neutral and in prone. She demonstrates (+) dynamic scour of right hip into ER and IR around 45 deg hip flexion. Increased tone through right proximal glutes and rotators. No strength deficits or TTP otherwise noted. Pt signs and symptoms appear consistent with likely articular surface pathology. She has had radiographs which were \"normal\" per pt report. She would benefit from PT to improve hip mobility, flexibility and pain to return to PLOF.   Evaluation Complexity History MEDIUM  Complexity : 1-2 comorbidities / personal factors will impact the outcome/ POC ; Examination MEDIUM Complexity : 3 Standardized tests and measures addressing body structure, function, activity limitation and / or DR. GUERRA participation in recreation  ;Presentation LOW Complexity : Stable, uncomplicated  ;Clinical Decision Making LOW Complexity : FOTO score of   Overall Complexity Rating: LOW   Problem List: pain affecting function, decrease ROM, decrease strength, decrease ADL/ functional abilitiies, decrease activity tolerance and decrease flexibility/ joint mobility   Treatment Plan may include any combination of the following: Therapeutic exercise, Therapeutic activities, Neuromuscular re-education, Physical agent/modality, Gait/balance training, Manual therapy, Patient education, Self Care training and Functional mobility training  Patient / Family readiness to learn indicated by: asking questions, trying to perform skills and interest  Persons(s) to be included in education: patient (P)  Barriers to Learning/Limitations: None  Patient Goal (s): To get the mobility back  Patient Self Reported Health Status: good  Rehabilitation Potential: good    Short Term Goals: To be accomplished in 2 weeks:  1. Pt will be I and compliant with HEP to maximize therapeutic effect. 2. Pt will improve right hip flexion PROM to 105 deg without groin pain to improve ease of self care. Long Term Goals: To be accomplished in 4 weeks:  1. Pt will improve right hip ER to 50 deg in prone for improved joint mobility and ADL ease. 2. Pt will improve right hip IR to 25 deg in prone without groin pain for improved mobility with ADLs. 3. Pt will demonstrate (-) dynamic scour of right hip to demonstrate improved joint capsule mobility and reduced articular irritation with self care. 4. Pt will report ability to don socks and shoes without difficulty for improved function. Frequency / Duration: Patient to be seen 2 times per week for 4 weeks.     Patient/ Caregiver education and instruction: Diagnosis, prognosis, self care, activity modification and exercises   [x]  Plan of care has been reviewed with MARCOS Gustafson DPT, CMTPT 8/26/2019 8:51 AM    ________________________________________________________________________    I certify that the above Therapy Services are being furnished while the patient is under my care. I agree with the treatment plan and certify that this therapy is necessary.     Physician's Signature:____________Date:_________TIME:________    ** Signature, Date and Time must be completed for valid certification **    Please sign and return to [address]  [place], [facility] [address]  [phone] fax

## 2019-09-09 NOTE — H&P PST ADULT - PROBLEM SELECTOR PROBLEM 1
September 9, 2019       Alexandre Dejesus MD  [address] [place] [address]  VIA In Basket      Patient: Marty Tadeo   YOB: 1955   Date of Visit: 9/9/2019       Dear Dr. Dejesus:    I saw your patient, Gregor Tadeo, for an evaluation. Below are my notes for this visit with him.    If you have questions, please do not hesitate to call me.      Sincerely,        Cameron Rocha MD        CC: No Recipients  Cameron Rocha MD  9/9/2019  2:55 PM  Sign when Signing Visit  REASON FOR VISIT  Chief Complaint   Patient presents with   • Follow-up     MGUS         HISTORY OF PRESENT ILLNESS    Mr. Marty Tadeo is a 64 year old male here for MGUS.Patient previously has been followed up by .    #1 The patient is a 63 year old male with multiple comorbidities assisted below including history of stage IV chronic kidney disease also Dr. Garcia from nephrology. As part of the workup, labs were obtained on 5/25/2017.    Serum protein electrophoresis was reported as     Interpretation:     Serum Immunofixation with Quant. IG:       IgG kappa monoclonal protein is present.      Light chain ratio 4.33    24-hour monoclonal urine protein electrophoresis was reported as   Interpretation:     A. Urine Protein Electrophoresis, Timed, (24 hr) (UMETMX):       Glomerular proteinuria (predominantly albumin).  No monoclonal protein is     detected.         B. Urine Immunofixation (UIFETZ):       A trace of serum monoclonal paraprotein with kappa light chain specificity is     present in the urine, with no associated free kappa light chain (Bence Azar     protein) detected.         Results:     Urine Total Protein =189 mg/dL,4536 mg/24 hours.  Reference range 0-148 mg/24 hours      Patient was referred to hematology for evaluation and management.    #2 labs from 6/19/2017 showed normal serum calcium, corrected calcium, albumin of 3.2 g/dL, elevated creatinine of 2.34 mg/dL, LDH within normal limits. NT  proBNP was elevated at 1373 pg per mL. Troponin I was less than 0.02 ng per mL. CBC showed hemoglobin of 12.1 g/dL, platelet count of 216,000/mcL, normal WBC count and differential. Serum protein electrophoresis/AMISHA was reported as  Interpretation:     A. Serum Protein Electrophoresis:     A monoclonal protein is in the gamma region and measures 0.1 gm/dL.         B. Serum Immunofixation with Quant. IG:     IgG kappa monoclonal protein is present.    Light chain ratio 5.17.    #3 bone marrow biopsy from 6/27/2017 was reported as  Pathologic Diagnosis :                                  ** FINAL DIAGNOSIS **         Peripheral blood:     - Mild normocytic normochromic anemia         A,B. Left bone marrow biopsy and aspirate:     - Mildly hypercellular bone marrow (50%) with a mild monoclonal plasmacytosis     (see comment)     - Mild erythroid hyperplasia     - Decreased/absent marrow storage iron without ring sideroblasts     - Conventional karyotype and myeloma FISH panel results are normal             Diagnosis Comment:     The patient is a 63-year-old male who is being evaluated for an IgG kappa     monoclonal paraprotein, recently quantitated at 0.1 g/dL. A UPEP showed     glomerular proteinuria and a trace of the monoclonal paraprotein. The patient     does have stage IV chronic kidney disease, which is clinically attributed to     hypertension and diabetic nephropathy. A recent metabolic panel shows a normal     calcium level. The normocytic anemia could be attributable to the renal     insufficiency and iron deficiency. A bone survey is pending. The plasma cells     are only mildly increased and no significant aggregation is seen. The current     findings with the low plasma cell count and lack of specific plasma cell     dyscrasia - related end organ damage suggests a monoclonal gammopathy of     undetermined significance. However, the findings should be correlated with all     clinical and radiographic  findings, including the pending bone survey. There     are no lymphomatous infiltrates and flow cytometry is negative for a     monoclonal B-cell population. Kappa positive monoclonal plasma cells were     identified by flow cytometry.       Plasma cells of 3.6% by differential, small population by flow, 0.1% by IHC. FISH negative for multiple myeloma panel. JAK2 negative on bone marrow.    #4 skeletal survey from 6/29/2017 was reported as no convincing evidence of osseous manifestations a multiple myeloma.    #5 07/05/2017 \" discussed results of myeloma workup  including labs, skeletal survey, bone marrow biopsy with the patient and family in detail. We discussed that findings suggest monoclonal gammopathy of undetermined significance. However, given chronic kidney disease, monoclonal paraproteinuria with Kappa light chain specificity, consider kidney biopsy to evaluate for myeloma involvement. Patient verbalized understanding. Writer will address this with patient's nephrologist Dr. Garcia.  we have previously discussed about obtaining a whole body MRI or PET scan after review of above results. Patient verbalized understanding. given nephrotic range proteinuria, amyloid is still in the differential although patient has kappa light chain specificity.  Troponin ultrasensitive within normal limits, NT proBNP is elevated. Discussed with the patient. As stated above, kidney biopsy will be useful to ascertain any amyloid deposition.\"    #6 Underwent kidney biopsy on 08/22/2017, pathology was reported as     A,  B and C. Right kidney, needle biopsy:     - Nodular diabetic nephropathy (Kimmelstiel-Jose type) with moderate chronic     tubulointerstitial changes and focal segmental glomerulosclerosis     - Nephroangiosclerosis     - See comment.         COMMENT: No evidence of proliferative, immune-mediated glomerulonephritis,     vasculitis, interstitial nephritis, monoclona l disease related nephropathy or     amyloid  deposition is identified in the current biopsy. The overall findings     are consistent with a nodular diabetic nephropathy (Kimmelstiel-Jose type)     with global glomerular sclerosis of 55% of the glomeruli and moderate chronic     tubulointerstitial changes with associated focal segmental glomerulosclerosis     (most likely secondary). Clinical correlation recommended.       #7 patient's lab investigations including CBC chemistry and immune electrophoresis and free light chain shows stable results in May 2019. Kappa lambda free light chain ratio 7.7.         INTERVAL HISTORY:  Since his last visit with Dr. Welch patient otherwise appears to be doing well denies any new significant complaints continues to follow up very closely with nephrology .    Doing past he had 2 visits to the emergency room the last one was yesterday. This was because of fever shortness of breath and cough and phlegm production. He denies any chest pain. He was treated with nebulizers and also with steroids and with a suspicion of infection was also started on antibiotics. He says he is feeling much better as compared to when he was in the emergency room. He denies any ongoing chest pain dizziness nausea vomiting abdominal pain or any evidence of any clinical bleeding. He has not had any fever since yesterday.  Denies any fever anorexia significant weight change or any night sweats. By denies any evidence of any clinical bleeding.    He continues to have adequate urine output with no significant hematuria.    Denies any new bone pains.           Allergies as of 09/09/2019   • (No Known Allergies)        Current Outpatient Medications   Medication   • predniSONE (DELTASONE) 20 MG tablet   • azithromycin (ZITHROMAX Z-MOE) 250 MG tablet   • calcitRIOL (ROCALTROL) 0.25 MCG capsule   • patiromer sorbitex (VELTASSA) 8.4 g suspension   • furosemide (LASIX) 20 MG tablet   • albuterol-ipratropium 2.5 mg/0.5 mg (DUONEB) 0.5-2.5 (3) MG/3ML nebulizer  solution   • atorvastatin (LIPITOR) 10 MG tablet   • metoPROLOL succinate (TOPROL-XL) 100 MG 24 hr tablet   • escitalopram (LEXAPRO) 10 MG tablet   • Vitamin D, Ergocalciferol, 79428 units capsule   • fluticasone (FLONASE) 50 MCG/ACT nasal spray   • albuterol 108 (90 Base) MCG/ACT inhaler   • sodium polystyrene sulfonate (SPS) powder   • montelukast (SINGULAIR) 10 MG tablet   • fluticasone-vilanterol (BREO ELLIPTA) 200-25 MCG/INH inhaler   • insulin glargine (LANTUS) 100 UNIT/ML injection   • insulin aspart (NOVOLOG) 100 UNIT/ML injection   • ONE TOUCH ULTRA TEST strip   • B-D INSULIN SYRINGE 31G X 516\" 0.5 ML Misc   • Blood Glucose Monitoring Suppl (BLOOD GLUCOSE METER) KIT   • Lancets Misc. KIT     No current facility-administered medications for this visit.         Past Medical History:   Diagnosis Date   • Chronic depression    • COPD (chronic obstructive pulmonary disease) (CMS/[facility])    • DM2 (diabetes mellitus, type 2) (CMS/[facility])    • HTN (hypertension)    • Hyperlipoproteinemia    • Morbid obesity (CMS/[facility])    • WADE (obstructive sleep apnea)    • RAD (reactive airway disease)    • Renal insufficiency     borderline   • Secondary hyperparathyroidism of renal origin (CMS/[facility]) 2017       Social History     Socioeconomic History   • Marital status: /Civil Union     Spouse name: Not on file   • Number of children: Not on file   • Years of education: Not on file   • Highest education level: Not on file   Occupational History   • Not on file   Social Needs   • Financial resource strain: Not on file   • Food insecurity:     Worry: Not on file     Inability: Not on file   • Transportation needs:     Medical: Not on file     Non-medical: Not on file   Tobacco Use   • Smoking status: Former Smoker     Packs/day: 1.00     Years: 15.00     Pack years: 15.00     Types: Cigarettes     Start date: 1978     Last attempt to quit: 1993     Years since quittin.7   • Smokeless tobacco: Never Used    Substance and Sexual Activity   • Alcohol use: No     Frequency: Never     Drinks per session: 1 or 2     Binge frequency: Never     Comment: Occasionaly   • Drug use: No   • Sexual activity: Not on file   Lifestyle   • Physical activity:     Days per week: Not on file     Minutes per session: Not on file   • Stress: Not on file   Relationships   • Social connections:     Talks on phone: Not on file     Gets together: Not on file     Attends Yarsanism service: Not on file     Active member of club or organization: Not on file     Attends meetings of clubs or organizations: Not on file     Relationship status: Not on file   • Intimate partner violence:     Fear of current or ex partner: Not on file     Emotionally abused: Not on file     Physically abused: Not on file     Forced sexual activity: Not on file   Other Topics Concern   • Not on file   Social History Narrative   • Not on file       Family History   Problem Relation Age of Onset   • Diabetes Father    • Cancer, Lung Father    • Diabetes Maternal Grandmother    • Dementia/Alzheimers Mother    • Diabetes Paternal Grandmother    • Diabetes Paternal Grandfather        REVIEW OF SYSTEMS  Lori Francis  2019  2:13 PM  Sign when Signing Visit  Marty Tadeo is a 64 year old male here for  Chief Complaint   Patient presents with   • Follow-up     MGUS      Denies latex allergy or sensitivity.    Medication verified and med list updated.  PCP and Pharmacy verified.    Social History     Tobacco Use   Smoking Status Former Smoker   • Packs/day: 1.00   • Years: 15.00   • Pack years: 15.00   • Types: Cigarettes   • Start date: 1978   • Last attempt to quit: 1993   • Years since quittin.7   Smokeless Tobacco Never Used     Advance Directives Filed: No    ECOG:      Height: Yes, shoes off.  Ht Readings from Last 1 Encounters:   19 5' 8\" (1.727 m)     Weight:Yes, shoes off.  Wt Readings from Last 3 Encounters:   19 (!) 179.7 kg    09/05/19 (!) 178.6 kg   06/11/19 (!) 181.6 kg       BMI: There is no height or weight on file to calculate BMI.    REVIEW OF SYSTEMS  GENERAL:  Patient denies headache, fevers, chills, night sweats, change in appetite, weight loss, dizziness, but complains of: excessive fatigue  ALLERGIC/IMMUNOLOGIC: Verified allergies: Yes  EYES:  Patient denies significant visual difficulties, double vision, blurred vision  ENT/MOUTH: Patient denies problems with hearing, sore throat, mouth sores, but complains of: sinus drainage  ENDOCRINE:  Patient denies thyroid disease, hormone replacement, hot flashes, but complains of: diabetes  HEMATOLOGIC/LYMPHATIC: Patient denies bleeding, tender lymph nodes, swollen lymph nodes, but complains of: easy bruising  BREASTS: Patient denies abnormal masses of breast, nipple discharge, pain  RESPIRATORY:  Patient denies lung pain with breathing, cough, coughing up blood, but complains of: shortness of breath  CARDIOVASCULAR:  Patient denies anginal chest pain, palpitations,, but complains of: shortness of breath when lying flat and peripheral edema feet ankles legs  GASTROINTESTINAL: Patient denies abdominal pain , nausea, vomiting, diarrhea, GI bleeding, constipation, change in bowel habits, heartburn, sensation of feeling full, difficulty swallowing  : Patient denies blood in the urine, burning with urination, urgency, hesitancy, incontinence, but complains of: frequency  MUSCULOSKELETAL:  Patient denies joint pain, bone pain, joint swelling, redness, decreased range of motion  SKIN:  Patient denies chronic rashes, inflammation, ulcerations, skin changes, but complains of: itching all over  NEUROLOGIC:  Patient denies loss of balance, areas of focal weakness, abnormal gait, sensory problems, but complains of: numbness feet and left thumb pointer finger  and tingling feet and left thumb pointer finger   PSYCHIATRIC: Patient denies insomnia, depression, anxiety      PHYSICAL  EXAMINATION    Performance status:  1 - No physically strenuous activity, but ambulatory and able to carry out light or sedentary work.     General:  Middle-aged male appears to be in no acute distress, presents to the clinic ambulatory and accompanied by his wife .   Vitals Signs:   Visit Vitals  /81 (BP Location: [facility], Patient Position: Sitting, Cuff Size: Regular)   Pulse 61   Temp 96.9 °F (36.1 °C) (Temporal)   Resp 16   Ht 5' 8\" (1.727 m)   Wt (!) 162.8 kg   SpO2 93%   BMI 54.59 kg/m²     ENT:  No oral ulcerations, no mucositis, no oropharyngeal masses seen.  Neck:  No neck masses palpable.  Trachea midline.  No thyroid palpable.  Lymphatics: No supraclavicular, axillary or inguinal lymph nodes palpable.  Lungs:  Good entry bilaterally, resonant to percussion.  No crepitations or rhonchi.  Cardiovascular:  S1 and S2 with no murmurs.  Regular rhythm.  Abdomen: Soft, nontender, no liver or spleen palpable.  No other masses palpable.  No tenderness in the flank area.  Extremities:  No pedal edema.  No stasis dermatitis.  No tenderness in bilateral calves.  Back:  No spinal tenderness or deformity.  Skin:  No area of ecchymosis, petechiae or rash.  Neurological:  Alert, oriented x3.  Power grade 4/5 in all 4 extremities.    LABS  Admission on 09/08/2019, Discharged on 09/08/2019   Component Date Value Ref Range Status   • Lactic Acid Venous 09/08/2019 1.5  <2.0 mmol/L Final   • COLOR 09/08/2019 YELLOW  YELLOW Final   • APPEARANCE 09/08/2019 CLEAR   Final   • GLUCOSE(URINE) 09/08/2019 NEGATIVE  NEGATIVE mg/dL Final   • BILIRUBIN 09/08/2019 NEGATIVE  NEGATIVE Final   • KETONES 09/08/2019 NEGATIVE  NEGATIVE mg/dL Final   • SPECIFIC GRAVITY 09/08/2019 1.016  1.005 - 1.030 Final   • BLOOD 09/08/2019 SMALL* NEGATIVE Final   • pH 09/08/2019 5.0  5.0 - 7.0 Units Final   • PROTEIN(URINE) 09/08/2019 100* NEGATIVE mg/dL Final   • UROBILINOGEN 09/08/2019 0.2  0.0 - 1.0 mg/dL Final   • NITRITE 09/08/2019 NEGATIVE  NEGATIVE  Final   • LEUKOCYTE ESTERASE 09/08/2019 NEGATIVE  NEGATIVE Final   • SPECIMEN TYPE 09/08/2019 URINE CLEAN CATCH   Final   • Squamous EPI'S 09/08/2019 1 to 5  0 - 5 /hpf Final   • RBC 09/08/2019 3 to 5  0 - 2 /hpf Final   • WBC 09/08/2019 1 to 5  0 - 5 /hpf Final   • BACTERIA 09/08/2019 NONE SEEN  NONE SEEN /hpf Final   • Hyaline Casts 09/08/2019 NONE SEEN  0 - 5 /lpf Final   • MUCOUS 09/08/2019 PRESENT   Final   • Amorphous Material 09/08/2019 PRESENT   Final   • Systolic Blood Pressure 09/08/2019 148   Final   • Diastolic Blood Pressure 09/08/2019 65   Final   • Ventricular Rate EKG/Min (BPM) 09/08/2019 66   Final   • Atrial Rate (BPM) 09/08/2019 66   Final   • WV-Interval (MSEC) 09/08/2019 160   Final   • QRS-Interval (MSEC) 09/08/2019 164   Final   • QT-Interval (MSEC) 09/08/2019 436   Final   • QTc 09/08/2019 457   Final   • P Axis (Degrees) 09/08/2019 31   Final   • R Axis (Degrees) 09/08/2019 94   Final   • T Axis (Degrees) 09/08/2019 50   Final   • REPORT TEXT 09/08/2019    Final                    Value:Normal sinus rhythm  with sinus arrhythmia  Right bundle branch block  Abnormal ECG  When compared with ECG of  11-JUN-2019 19:02,  No significant change was found  Confirmed by NELY VALDOVINOS MD ([id number]),  Angela Layne ([id number]) on 9/9/2019 8:58:17 AM     • WBC 09/08/2019 23.6* 4.2 - 11.0 K/mcL Final   • RBC 09/08/2019 3.83* 4.50 - 5.90 mil/mcL Final   • HGB 09/08/2019 11.5* 13.0 - 17.0 g/dL Final   • HCT 09/08/2019 35.1* 39.0 - 51.0 % Final   • MCV 09/08/2019 91.6  78.0 - 100.0 fl Final   • MCH 09/08/2019 30.0  26.0 - 34.0 pg Final   • MCHC 09/08/2019 32.8  32.0 - 36.5 g/dL Final   • RDW-CV 09/08/2019 13.0  11.0 - 15.0 % Final   • PLT 09/08/2019 170  140 - 450 K/mcL Final   • NRBC 09/08/2019 0  0 /100 WBC Final   • DIFF TYPE 09/08/2019 AUTOMATED DIFFERENTIAL   Final   • Neutrophil 09/08/2019 91  % Final   • LYMPH 09/08/2019 3  % Final   • MONO 09/08/2019 5  % Final   • EOSIN 09/08/2019 0  % Final   • BASO  09/08/2019 0  % Final   • Percent Immature Granuloctyes 09/08/2019 1  % Final   • Absolute Neutrophil 09/08/2019 21.6* 1.8 - 7.7 K/mcL Final   • Absolute Lymph 09/08/2019 0.7* 1.0 - 4.0 K/mcL Final   • Absolute Mono 09/08/2019 1.1* 0.3 - 0.9 K/mcL Final   • Absolute Eos 09/08/2019 0.0* 0.1 - 0.5 K/mcL Final   • Absolute Baso 09/08/2019 0.0  0.0 - 0.3 K/mcL Final   • Absolute Immature Granulocytes 09/08/2019 0.2  0 - 0.2 K/mcl Final   • Sodium 09/08/2019 136  135 - 145 mmol/L Final   • Potassium 09/08/2019 4.7  3.4 - 5.1 mmol/L Final   • Chloride 09/08/2019 102  98 - 107 mmol/L Final   • Carbon Dioxide 09/08/2019 20* 21 - 32 mmol/L Final   • Anion Gap 09/08/2019 19  10 - 20 mmol/L Final   • Glucose 09/08/2019 139* 65 - 99 mg/dL Final   • BUN 09/08/2019 78* 6 - 20 mg/dL Final   • Creatinine 09/08/2019 2.97* 0.67 - 1.17 mg/dL Final   • GFR Estimate,  09/08/2019 24   Final   • GFR Estimate, Non  09/08/2019 21   Final   • BUN/Creatinine Ratio 09/08/2019 26* 7 - 25 Final   • CALCIUM 09/08/2019 8.4  8.4 - 10.2 mg/dL Final   • TOTAL BILIRUBIN 09/08/2019 0.4  0.2 - 1.0 mg/dL Final   • AST/SGOT 09/08/2019 18  <38 Units/L Final   • ALT/SGPT 09/08/2019 17  <64 Units/L Final   • ALK PHOSPHATASE 09/08/2019 69  45 - 117 Units/L Final   • TOTAL PROTEIN 09/08/2019 7.0  6.4 - 8.2 g/dL Final   • Albumin 09/08/2019 3.4* 3.6 - 5.1 g/dL Final   • GLOBULIN 09/08/2019 3.6  2.0 - 4.0 g/dL Final   • A/G Ratio, Serum 09/08/2019 0.9* 1.0 - 2.4 Final   • TROPONIN I 09/08/2019 <0.02  <0.05 ng/mL Final   • PROTIME 09/08/2019 11.4  9.7 - 11.8 sec Final   • INR 09/08/2019 1.1   Final   • PTT 09/08/2019 31  22 - 32 sec Final   • AMYLASE 09/08/2019 39  25 - 115 Units/L Final   • Lipase 09/08/2019 102  73 - 393 Units/L Final   • MAGNESIUM 09/08/2019 1.8  1.7 - 2.4 mg/dL Final   • ESR 09/08/2019 39* 0 - 20 mm/hr Final   • NT proBNP 09/08/2019 3,053* <126 pg/mL Final   • D Dimer Quantitative 09/08/2019 0.57* <0.57 mg/L  (FEU) Final          DIAGNOSTICS:  Xr Chest Ap Or Pa    Result Date: 9/8/2019  XR CHEST AP OR PA, 9/8/2019 3:14 AM CLINICAL INFORMATION: Shortness of breath. COMPARISON: Chest radiograph, 6/11/2019. FINDINGS:    The cardiomediastinal silhouette is enlarged, similar to prior. Central pulmonary vasculature remains mildly congested. There is no new or enlarging airspace opacification, sizable pleural effusion or pneumothorax.                   IMPRESSION: Cardiomegaly and borderline pulmonary vascular congestion pattern, similar to prior.     Ct Head Wo Contrast    Result Date: 9/8/2019  CT HEAD WO CONTRAST CLINICAL INFORMATION: Altered level of consciousness (LOC), unexplained COMPARISON: None. TECHNIQUE: Axial images were acquired through the head without intravenous contrast. FINDINGS: The ventricles and sulci are mildly prominent, but are considered normal for the patient's age. There is no evidence of acute infarction, hemorrhage, or mass effect. There are scattered areas of hypodensity in the deep cerebral white matter and basal nuclei, most consistent with mild small vessel ischemic disease.      The CT attenuation of the brain parenchyma is otherwise unremarkable.   Scattered vascular calcifications are noted.  Paranasal sinuses and mastoid air cells are appropriately aerated.     There are no definite fractures of the skull base or calvarium.     IMPRESSION: 1. No acute intracranial findings. 2. Mild age-related atrophy and probable mild small vessel ischemic disease.       ASSESSMENT  1. MGUS (monoclonal gammopathy of unknown significance)    2. Anemia associated with chronic renal failure    3. CKD (chronic kidney disease), stage IV (CMS/HCC)         PLAN  #1 Patient is a 64-year-old male with multiple comorbidities including history of stage IV security presents for evaluation for IgG kappa monoclonal gammopathy.  #2 monoclonal proteinuria with kappa light chain specificity patient, no free light chains.  Please see details above.  #3 extensive workup has been performed under care of Dr. Welch, please see above. Findings suggest monoclonal gammopathy of undetermined significance. Please see discussion below.    Plan:   #1 previously and again today discussed patient's lab findings of monoclonal gammopathy, difference between MGUS, MGRS, smoldering multiple myeloma and symptomatic multiple myeloma with the patient in detail.  #2 reviewed NCCN guidelines for care for multiple myeloma with the patient in detail.  #3 we have previously discussed results of above myeloma workup including labs, skeletal survey, bone marrow biopsy, kidney biopsy with the patient and family in detail. We discussed that findings suggest monoclonal gammopathy of undetermined significance.  #4 patient has declined MRI or PET scan in the past in discussions with .  #5 discussed interval follow-up labs from May 2019 with the patient in detail which are reported as total leukocyte count and differentials within normal limits, hemoglobin of 11.8 g/dL, normal platelet counts. corrected calcium within normal limits, improving serum creatinine for which she is following with nephrology. Serum protein electrophoresis/AMISHA is reported as IgG kappa monoclonal portion of 0.2 with M protein 0.2 K I lambda free light chain ratio 7.74. Overall the laps appear to be pretty stable as compared to what they were about 9 months ago. We have not repeated labs today at this point because of his acute illness I would like to wait for about 4-5 weeks before we repeat his immunofixation immunoelectrophoresis along with a CBC and his chemistry.  Skeletal survey from 7/6/2018 is reported as no evidence of osseous manifestation of multiple myeloma.  #6 his recent emergency room visit is likely secondary to congestive heart failure and possible lung infection. He is on antibiotics and prednisone therapy as recommended by emergency room. He is feeling better. He  needs to have a close follow-up with Dr. Chavez and I have advised him that he may need to have a repeat chest x-ray and an echocardiogram but I'll leave that up to Dr. Chavez.    Continue close clinical and lab surveillance.  Patient previously was advised by  to be seen in 3 months but he has not shown up and about 9 months later I emphasized with him that it would be very important for us to continue to follow him up closely.      FOLLOWUP:  Return in about 6 weeks (around 10/21/2019) for visit and labs. with repeat CBC immunoelectrophoresis chemistry. In case he has recurrent infections or change in clinical status he will contact our office.    All of the patient's questions were answered to his satisfaction.               Incisional hernia without obstruction or gangrene

## 2019-10-25 ENCOUNTER — APPOINTMENT (OUTPATIENT)
Dept: PULMONOLOGY | Facility: CLINIC | Age: 59
End: 2019-10-25
Payer: COMMERCIAL

## 2019-10-25 VITALS
DIASTOLIC BLOOD PRESSURE: 79 MMHG | HEIGHT: 65 IN | BODY MASS INDEX: 31.65 KG/M2 | HEART RATE: 69 BPM | OXYGEN SATURATION: 98 % | WEIGHT: 190 LBS | SYSTOLIC BLOOD PRESSURE: 164 MMHG | TEMPERATURE: 97.7 F

## 2019-10-25 DIAGNOSIS — Z82.5 FAMILY HISTORY OF ASTHMA AND OTHER CHRONIC LOWER RESPIRATORY DISEASES: ICD-10-CM

## 2019-10-25 PROCEDURE — 94060 EVALUATION OF WHEEZING: CPT

## 2019-10-25 PROCEDURE — 99204 OFFICE O/P NEW MOD 45 MIN: CPT | Mod: 25

## 2019-10-25 PROCEDURE — 94729 DIFFUSING CAPACITY: CPT

## 2019-10-25 PROCEDURE — 94727 GAS DIL/WSHOT DETER LNG VOL: CPT

## 2019-10-25 RX ORDER — IPRATROPIUM BROMIDE AND ALBUTEROL 20; 100 UG/1; UG/1
20-100 SPRAY, METERED RESPIRATORY (INHALATION)
Refills: 0 | Status: ACTIVE | COMMUNITY

## 2019-10-25 RX ORDER — METFORMIN HYDROCHLORIDE 500 MG/1
500 TABLET, COATED ORAL
Refills: 0 | Status: ACTIVE | COMMUNITY

## 2019-10-25 NOTE — PHYSICAL EXAM
[Well Groomed] : well groomed [General Appearance - In No Acute Distress] : no acute distress [Neck Appearance] : the appearance of the neck was normal [Heart Sounds] : normal S1 and S2 [] : no respiratory distress [Respiration, Rhythm And Depth] : normal respiratory rhythm and effort [Exaggerated Use Of Accessory Muscles For Inspiration] : no accessory muscle use [Auscultation Breath Sounds / Voice Sounds] : lungs were clear to auscultation bilaterally [Nail Clubbing] : no clubbing of the fingernails [Cyanosis, Localized] : no localized cyanosis [FreeTextEntry1] : minior inspiratory stridor with deep breathing

## 2019-10-25 NOTE — ASSESSMENT
[FreeTextEntry1] : trial of anoro once daily\par will re-evaluate in [facility] next week, will obtain cxr at that time (unavailable today in [place]) \par consider ent evaluation if no improvement

## 2019-10-25 NOTE — HISTORY OF PRESENT ILLNESS
[FreeTextEntry1] : 59F with asthma x 5 years, typical triggers cold air, dust.  Symptoms seem to have worsen about 2 days ago with shortness of breath and chest tightness.  Denies cough or wheeze.  In addition has a headache.  Last admitted to hospital for asthma about 10 months ago x 2 days.  No history of intubations/ICU admits.  Denies recent illness, fever, chills, runny nose, sore throat etc. \par \par Never smoker\par works as a . \par fam hx: daughter with asthma

## 2019-10-31 ENCOUNTER — APPOINTMENT (OUTPATIENT)
Dept: PULMONOLOGY | Facility: CLINIC | Age: 59
End: 2019-10-31
Payer: COMMERCIAL

## 2019-10-31 VITALS
OXYGEN SATURATION: 96 % | HEART RATE: 72 BPM | DIASTOLIC BLOOD PRESSURE: 82 MMHG | TEMPERATURE: 97.9 F | RESPIRATION RATE: 16 BRPM | SYSTOLIC BLOOD PRESSURE: 129 MMHG

## 2019-10-31 DIAGNOSIS — R93.89 ABNORMAL FINDINGS ON DIAGNOSTIC IMAGING OF OTHER SPECIFIED BODY STRUCTURES: ICD-10-CM

## 2019-10-31 PROCEDURE — 71046 X-RAY EXAM CHEST 2 VIEWS: CPT

## 2019-10-31 PROCEDURE — 94010 BREATHING CAPACITY TEST: CPT

## 2019-10-31 PROCEDURE — 99213 OFFICE O/P EST LOW 20 MIN: CPT | Mod: 25

## 2019-10-31 NOTE — PROCEDURE
[FreeTextEntry1] : CXR: ?right hilar prominence, no focal consolidation or pleural effusions, cardiac silhouette appears normal.  No bony abnormality.\par \par Spirometry: restricted, unchanged\par \par \par Prior exams reviewed:\par PFT: restrictive dysfunction without a significant bronchodilator response.  Lung volumes low with evidence of air trapping. DLCO normal when corrected for volumes.\par \par Exhaled nitric oxide: normal\par

## 2019-10-31 NOTE — ASSESSMENT
[FreeTextEntry1] : will cont anoro for now, rescue inhaler\par \par obtain CT chest with contrast to eval right hilum

## 2019-10-31 NOTE — HISTORY OF PRESENT ILLNESS
[FreeTextEntry1] : using anoro daily\par feeling a little better\par chest still a little tight\par no cough/fever

## 2019-11-26 ENCOUNTER — APPOINTMENT (OUTPATIENT)
Dept: PULMONOLOGY | Facility: CLINIC | Age: 59
End: 2019-11-26

## 2019-12-26 ENCOUNTER — EMERGENCY (EMERGENCY)
Facility: HOSPITAL | Age: 59
LOS: 1 days | Discharge: HOME | End: 2019-12-26
Attending: EMERGENCY MEDICINE
Payer: COMMERCIAL

## 2019-12-26 VITALS
RESPIRATION RATE: 18 BRPM | TEMPERATURE: 98 F | SYSTOLIC BLOOD PRESSURE: 125 MMHG | OXYGEN SATURATION: 99 % | HEART RATE: 66 BPM | DIASTOLIC BLOOD PRESSURE: 84 MMHG | HEIGHT: 65 IN | WEIGHT: 184.97 LBS

## 2019-12-26 VITALS
RESPIRATION RATE: 18 BRPM | SYSTOLIC BLOOD PRESSURE: 138 MMHG | HEART RATE: 83 BPM | DIASTOLIC BLOOD PRESSURE: 84 MMHG | OXYGEN SATURATION: 96 % | TEMPERATURE: 98 F

## 2019-12-26 DIAGNOSIS — Z98.890 OTHER SPECIFIED POSTPROCEDURAL STATES: Chronic | ICD-10-CM

## 2019-12-26 DIAGNOSIS — Z90.710 ACQUIRED ABSENCE OF BOTH CERVIX AND UTERUS: Chronic | ICD-10-CM

## 2019-12-26 PROCEDURE — 71250 CT THORAX DX C-: CPT

## 2019-12-26 PROCEDURE — 71046 X-RAY EXAM CHEST 2 VIEWS: CPT | Mod: 26

## 2019-12-26 PROCEDURE — 71250 CT THORAX DX C-: CPT | Mod: 26

## 2019-12-26 PROCEDURE — 71046 X-RAY EXAM CHEST 2 VIEWS: CPT

## 2019-12-26 PROCEDURE — 99284 EMERGENCY DEPT VISIT MOD MDM: CPT | Mod: 25

## 2019-12-26 PROCEDURE — 82962 GLUCOSE BLOOD TEST: CPT

## 2019-12-26 PROCEDURE — 99284 EMERGENCY DEPT VISIT MOD MDM: CPT

## 2019-12-26 RX ORDER — IBUPROFEN 200 MG
600 TABLET ORAL ONCE
Refills: 0 | Status: COMPLETED | OUTPATIENT
Start: 2019-12-26 | End: 2019-12-26

## 2019-12-26 RX ORDER — ACETAMINOPHEN 500 MG
975 TABLET ORAL ONCE
Refills: 0 | Status: COMPLETED | OUTPATIENT
Start: 2019-12-26 | End: 2019-12-26

## 2019-12-26 RX ADMIN — Medication 600 MILLIGRAM(S): at 08:37

## 2019-12-26 RX ADMIN — Medication 975 MILLIGRAM(S): at 08:37

## 2019-12-26 NOTE — ED PROVIDER NOTE - NS ED ROS FT
Gen: No fever, normal appetite  Eyes: No eye irritation or discharge  ENT: No earpain, congestion, sore throat  Resp: No cough or trouble breathing  Cardiovascular: No chest pain or palpitation  Gastroenteric: No nausea/vomiting, diarrhea, constipation  : No dysuria  MS: +L rib pain  Skin: No rashes  Neuro: No headache  Remainder negative, except as per the HPI

## 2019-12-26 NOTE — ED PROVIDER NOTE - OBJECTIVE STATEMENT
59F h/o DM p/w L rib pain s/p fall onto a chair. pt mechanically fell, did not hit head, no sob or diff breathing. + L rib pain. no abd pain. took advil which didn't help.

## 2019-12-26 NOTE — ED PROVIDER NOTE - NSFOLLOWUPINSTRUCTIONS_ED_ALL_ED_FT
1) Please use the incentive spirometer and take tylenol and motrin for pain control. Please follow-up with your primary care doctor within the next 3 days.  Please call today or tomorrow for an appointment.  If you cannot follow-up with your doctor(s), please return to the ED for any urgent issues.  2) If you have any worsening of symptoms or any other concerns please return to the ED immediately.  3) Please continue taking your home medications as directed.  4) You may have been given a copy of your labs and/or imaging.  Please go over these with your primary care doctor.

## 2019-12-26 NOTE — ED PROVIDER NOTE - CLINICAL SUMMARY MEDICAL DECISION MAKING FREE TEXT BOX
59F h/o DM p/w L rib pain s/p fall onto a chair. exam with significant tenderness to lower ribs, no abd tenderness. concern for rib fx, low suspicion for ptx. if xray negative, will eval with CT. 59F h/o DM p/w L rib pain s/p fall onto a chair. exam with significant tenderness to lower ribs, no abd tenderness. concern for rib fx, low suspicion for ptx. if xray negative, will eval with CT.    Attending MD Haque: 59F h/o DM p/w L rib pain s/p fall onto a chair.  No head injury of LOC.  Mechanical trip and fall.   Complains of pain with breathing but denies SOB.  On exam there is tenderness to palpation to left anterior lower ribs.  No crepitus.  Left upper lung fields with egophony.  Plan:  chest xray and consider chest CT if neg.  Pain control and incentive spirometer.

## 2019-12-26 NOTE — ED ADULT NURSE NOTE - OBJECTIVE STATEMENT
59 yr old female to ED , accomp by family , c/o l mid abd pain after falling on chair last night. Took Tylenol last night with min relief. Denies hit head. Denies anticoag meds. Denies sob C/o increased pain on inspiration Denies chest pain. Denies N/V Denies burn or diff urinating.

## 2019-12-26 NOTE — ED PROVIDER NOTE - PATIENT PORTAL LINK FT
You can access the FollowMyHealth Patient Portal offered by [facility] by registering at the following website: [email or web address]. By joining [facility]’s FollowMyHealth portal, you will also be able to view your health information using other applications (apps) compatible with our system.

## 2019-12-26 NOTE — ED PROVIDER NOTE - PHYSICAL EXAMINATION
Vitals: WNL  Gen: laying comfortably in NAD  Head: NCAT  ENT: sclerae white, anicterus, moist mucous membranes. No exudates.   CV: RRR. Audible S1 and S2.   Pulm: Clear to auscultation bilaterally. No wheezes, rales, or rhonchi  Abd: soft, normoactive BS x4, NTND, no rebound, no guarding, no rashes  Musculoskeletal:  +L 10-12 rib ttp without obvious deformities  Skin: no lesions or scars noted  Neurologic: AAOx3  : no CVA tenderness  Psych: normal affect Vitals: WNL  Gen: laying comfortably in NAD  Head: NCAT  ENT: sclerae white, anicterus, moist mucous membranes. No exudates.   CV: RRR. Audible S1 and S2.   Pulm: Clear to auscultation bilaterally. No wheezes, rales, or rhonchi  Abd: soft, normoactive BS x4, NTND, no rebound, no guarding, no rashes  Musculoskeletal:  +L 10-12 rib ttp without obvious deformities or ecchymosis  Skin: no lesions or scars noted  Neurologic: AAOx3  : no CVA tenderness  Psych: normal affect

## 2020-05-07 ENCOUNTER — TRANSCRIPTION ENCOUNTER (OUTPATIENT)
Age: 60
End: 2020-05-07

## 2020-06-16 NOTE — H&P ADULT - DOES THIS PATIENT HAVE A HISTORY OF OR HAS BEEN DX WITH HEART FAILURE?
[FreeTextEntry1] : 53 yo F with simple renal cysts on CT. Appear to be simple. Will obtain renal US to monitor. no

## 2020-07-12 ENCOUNTER — EMERGENCY (EMERGENCY)
Facility: HOSPITAL | Age: 60
LOS: 1 days | Discharge: HOME | End: 2020-07-12
Attending: EMERGENCY MEDICINE
Payer: COMMERCIAL

## 2020-07-12 VITALS
TEMPERATURE: 98 F | WEIGHT: 179.9 LBS | SYSTOLIC BLOOD PRESSURE: 156 MMHG | DIASTOLIC BLOOD PRESSURE: 91 MMHG | RESPIRATION RATE: 18 BRPM | HEART RATE: 65 BPM | OXYGEN SATURATION: 98 % | HEIGHT: 65 IN

## 2020-07-12 VITALS
SYSTOLIC BLOOD PRESSURE: 120 MMHG | RESPIRATION RATE: 18 BRPM | DIASTOLIC BLOOD PRESSURE: 69 MMHG | HEART RATE: 60 BPM | TEMPERATURE: 98 F | OXYGEN SATURATION: 100 %

## 2020-07-12 DIAGNOSIS — Z90.710 ACQUIRED ABSENCE OF BOTH CERVIX AND UTERUS: Chronic | ICD-10-CM

## 2020-07-12 DIAGNOSIS — Z98.890 OTHER SPECIFIED POSTPROCEDURAL STATES: Chronic | ICD-10-CM

## 2020-07-12 LAB
ALBUMIN SERPL ELPH-MCNC: 4.6 G/DL — SIGNIFICANT CHANGE UP (ref 3.3–5)
ALP SERPL-CCNC: 82 U/L — SIGNIFICANT CHANGE UP (ref 40–120)
ALT FLD-CCNC: 33 U/L — SIGNIFICANT CHANGE UP (ref 10–45)
ANION GAP SERPL CALC-SCNC: 14 MMOL/L — SIGNIFICANT CHANGE UP (ref 5–17)
APPEARANCE UR: CLEAR — SIGNIFICANT CHANGE UP
APTT BLD: 29.5 SEC — SIGNIFICANT CHANGE UP (ref 27.5–35.5)
AST SERPL-CCNC: 24 U/L — SIGNIFICANT CHANGE UP (ref 10–40)
BACTERIA # UR AUTO: NEGATIVE — SIGNIFICANT CHANGE UP
BASOPHILS # BLD AUTO: 0.07 K/UL — SIGNIFICANT CHANGE UP (ref 0–0.2)
BASOPHILS NFR BLD AUTO: 0.8 % — SIGNIFICANT CHANGE UP (ref 0–2)
BILIRUB SERPL-MCNC: 0.3 MG/DL — SIGNIFICANT CHANGE UP (ref 0.2–1.2)
BILIRUB UR-MCNC: NEGATIVE — SIGNIFICANT CHANGE UP
BUN SERPL-MCNC: 14 MG/DL — SIGNIFICANT CHANGE UP (ref 7–23)
CALCIUM SERPL-MCNC: 9.6 MG/DL — SIGNIFICANT CHANGE UP (ref 8.4–10.5)
CHLORIDE SERPL-SCNC: 103 MMOL/L — SIGNIFICANT CHANGE UP (ref 96–108)
CO2 SERPL-SCNC: 20 MMOL/L — LOW (ref 22–31)
COLOR SPEC: COLORLESS — SIGNIFICANT CHANGE UP
CREAT SERPL-MCNC: 0.7 MG/DL — SIGNIFICANT CHANGE UP (ref 0.5–1.3)
DIFF PNL FLD: NEGATIVE — SIGNIFICANT CHANGE UP
EOSINOPHIL # BLD AUTO: 0.59 K/UL — HIGH (ref 0–0.5)
EOSINOPHIL NFR BLD AUTO: 6.9 % — HIGH (ref 0–6)
EPI CELLS # UR: 0 /HPF — SIGNIFICANT CHANGE UP
GLUCOSE SERPL-MCNC: 154 MG/DL — HIGH (ref 70–99)
GLUCOSE UR QL: ABNORMAL
HCT VFR BLD CALC: 45.6 % — HIGH (ref 34.5–45)
HGB BLD-MCNC: 14.6 G/DL — SIGNIFICANT CHANGE UP (ref 11.5–15.5)
HYALINE CASTS # UR AUTO: 0 /LPF — SIGNIFICANT CHANGE UP (ref 0–2)
IMM GRANULOCYTES NFR BLD AUTO: 0.7 % — SIGNIFICANT CHANGE UP (ref 0–1.5)
INR BLD: 0.89 RATIO — SIGNIFICANT CHANGE UP (ref 0.88–1.16)
KETONES UR-MCNC: NEGATIVE — SIGNIFICANT CHANGE UP
LEUKOCYTE ESTERASE UR-ACNC: NEGATIVE — SIGNIFICANT CHANGE UP
LYMPHOCYTES # BLD AUTO: 3.89 K/UL — HIGH (ref 1–3.3)
LYMPHOCYTES # BLD AUTO: 45.4 % — HIGH (ref 13–44)
MCHC RBC-ENTMCNC: 29 PG — SIGNIFICANT CHANGE UP (ref 27–34)
MCHC RBC-ENTMCNC: 32 GM/DL — SIGNIFICANT CHANGE UP (ref 32–36)
MCV RBC AUTO: 90.7 FL — SIGNIFICANT CHANGE UP (ref 80–100)
MONOCYTES # BLD AUTO: 0.81 K/UL — SIGNIFICANT CHANGE UP (ref 0–0.9)
MONOCYTES NFR BLD AUTO: 9.5 % — SIGNIFICANT CHANGE UP (ref 2–14)
NEUTROPHILS # BLD AUTO: 3.15 K/UL — SIGNIFICANT CHANGE UP (ref 1.8–7.4)
NEUTROPHILS NFR BLD AUTO: 36.7 % — LOW (ref 43–77)
NITRITE UR-MCNC: NEGATIVE — SIGNIFICANT CHANGE UP
NRBC # BLD: 0 /100 WBCS — SIGNIFICANT CHANGE UP (ref 0–0)
PH UR: 7 — SIGNIFICANT CHANGE UP (ref 5–8)
PLATELET # BLD AUTO: 240 K/UL — SIGNIFICANT CHANGE UP (ref 150–400)
POTASSIUM SERPL-MCNC: 3.8 MMOL/L — SIGNIFICANT CHANGE UP (ref 3.5–5.3)
POTASSIUM SERPL-SCNC: 3.8 MMOL/L — SIGNIFICANT CHANGE UP (ref 3.5–5.3)
PROT SERPL-MCNC: 7.5 G/DL — SIGNIFICANT CHANGE UP (ref 6–8.3)
PROT UR-MCNC: NEGATIVE — SIGNIFICANT CHANGE UP
PROTHROM AB SERPL-ACNC: 10.3 SEC — LOW (ref 10.6–13.6)
RBC # BLD: 5.03 M/UL — SIGNIFICANT CHANGE UP (ref 3.8–5.2)
RBC # FLD: 13.7 % — SIGNIFICANT CHANGE UP (ref 10.3–14.5)
RBC CASTS # UR COMP ASSIST: 1 /HPF — SIGNIFICANT CHANGE UP (ref 0–4)
SODIUM SERPL-SCNC: 137 MMOL/L — SIGNIFICANT CHANGE UP (ref 135–145)
SP GR SPEC: 1 — LOW (ref 1.01–1.02)
UROBILINOGEN FLD QL: NEGATIVE — SIGNIFICANT CHANGE UP
WBC # BLD: 8.57 K/UL — SIGNIFICANT CHANGE UP (ref 3.8–10.5)
WBC # FLD AUTO: 8.57 K/UL — SIGNIFICANT CHANGE UP (ref 3.8–10.5)
WBC UR QL: 1 /HPF — SIGNIFICANT CHANGE UP (ref 0–5)

## 2020-07-12 PROCEDURE — 70450 CT HEAD/BRAIN W/O DYE: CPT

## 2020-07-12 PROCEDURE — 99285 EMERGENCY DEPT VISIT HI MDM: CPT

## 2020-07-12 PROCEDURE — 85610 PROTHROMBIN TIME: CPT

## 2020-07-12 PROCEDURE — 96361 HYDRATE IV INFUSION ADD-ON: CPT

## 2020-07-12 PROCEDURE — 80053 COMPREHEN METABOLIC PANEL: CPT

## 2020-07-12 PROCEDURE — 96374 THER/PROPH/DIAG INJ IV PUSH: CPT

## 2020-07-12 PROCEDURE — 85027 COMPLETE CBC AUTOMATED: CPT

## 2020-07-12 PROCEDURE — 70450 CT HEAD/BRAIN W/O DYE: CPT | Mod: 26

## 2020-07-12 PROCEDURE — 96375 TX/PRO/DX INJ NEW DRUG ADDON: CPT

## 2020-07-12 PROCEDURE — 99284 EMERGENCY DEPT VISIT MOD MDM: CPT | Mod: 25

## 2020-07-12 PROCEDURE — 81001 URINALYSIS AUTO W/SCOPE: CPT

## 2020-07-12 PROCEDURE — 85730 THROMBOPLASTIN TIME PARTIAL: CPT

## 2020-07-12 RX ORDER — DIPHENHYDRAMINE HCL 50 MG
25 CAPSULE ORAL ONCE
Refills: 0 | Status: COMPLETED | OUTPATIENT
Start: 2020-07-12 | End: 2020-07-12

## 2020-07-12 RX ORDER — SODIUM CHLORIDE 9 MG/ML
1000 INJECTION INTRAMUSCULAR; INTRAVENOUS; SUBCUTANEOUS ONCE
Refills: 0 | Status: COMPLETED | OUTPATIENT
Start: 2020-07-12 | End: 2020-07-12

## 2020-07-12 RX ORDER — KETOROLAC TROMETHAMINE 30 MG/ML
15 SYRINGE (ML) INJECTION ONCE
Refills: 0 | Status: DISCONTINUED | OUTPATIENT
Start: 2020-07-12 | End: 2020-07-12

## 2020-07-12 RX ORDER — ACETAMINOPHEN 500 MG
975 TABLET ORAL ONCE
Refills: 0 | Status: COMPLETED | OUTPATIENT
Start: 2020-07-12 | End: 2020-07-12

## 2020-07-12 RX ORDER — METOCLOPRAMIDE HCL 10 MG
10 TABLET ORAL ONCE
Refills: 0 | Status: DISCONTINUED | OUTPATIENT
Start: 2020-07-12 | End: 2020-07-12

## 2020-07-12 RX ORDER — METOCLOPRAMIDE HCL 10 MG
10 TABLET ORAL ONCE
Refills: 0 | Status: COMPLETED | OUTPATIENT
Start: 2020-07-12 | End: 2020-07-12

## 2020-07-12 RX ADMIN — Medication 975 MILLIGRAM(S): at 12:31

## 2020-07-12 RX ADMIN — Medication 10 MILLIGRAM(S): at 12:31

## 2020-07-12 RX ADMIN — SODIUM CHLORIDE 1000 MILLILITER(S): 9 INJECTION INTRAMUSCULAR; INTRAVENOUS; SUBCUTANEOUS at 14:54

## 2020-07-12 RX ADMIN — Medication 25 MILLIGRAM(S): at 12:33

## 2020-07-12 RX ADMIN — SODIUM CHLORIDE 1000 MILLILITER(S): 9 INJECTION INTRAMUSCULAR; INTRAVENOUS; SUBCUTANEOUS at 12:32

## 2020-07-12 RX ADMIN — Medication 15 MILLIGRAM(S): at 14:54

## 2020-07-12 NOTE — ED ADULT NURSE NOTE - OBJECTIVE STATEMENT
61 yo F w/ PMHx of CVA (no residual deficits), DM, asthma presents to ED via waiting room c/o HA x1 mo. Pt reports throbbing HA daily, has tried Excedrin and Tylenol at home w/ minimal relief. Reports associated nausea, denies vomiting, dizziness, changes in vision. Extremities strong and equal b/l, gross neuro exam intact. No facial droop. No numbness/tingling/weakness. PERRL. No recent falls/head injury. Pt denies any CP, SOB, cough, fever, chills, urinary complaints, constipation, diarrhea. Pt A&Ox4, lungs CTA, +central pulses. Abdomen soft, not tender, not distended. Ambulating w/ steady gait, safety and comfort maintained, no acute distress noted at this time. Pt denies any recent travel or known sick contacts.

## 2020-07-12 NOTE — ED ADULT NURSE REASSESSMENT NOTE - NS ED NURSE REASSESS COMMENT FT1
Pt ambulated to restroom w/ steady gait noted. Urine sample obtained and specimens sent to lab. Awaiting CT scan and results. Pt ambulated to restroom w/ steady gait noted. Reports improvement in pain to 3/10, denies current nausea. Urine sample obtained and specimens sent to lab. Awaiting CT scan and results.

## 2020-07-12 NOTE — ED PROVIDER NOTE - CLINICAL SUMMARY MEDICAL DECISION MAKING FREE TEXT BOX
Attending MD Jeter: 60F presenting with global headache pain x 1 month, grossly non-focal neuro exam. Likely primary headache disorder i.e. migraine or tension type headache but will r/o ICH or mass with screening CT head. Patient is already under the care of a neurologist for these headaches and MRI is to be performed as outpatient and patient was advised to proceed with MRI as planned even if CT head is unrevealing. Plan to trial IV reglan, tylenol, benadryl for symptomatic treatment of acute on chronic headache

## 2020-07-12 NOTE — ED PROVIDER NOTE - PHYSICAL EXAMINATION
NAD, VSS, Afebrile, + PERRL with EOMI, Normal ears. No spinal tender. Full ROMs of neck without tender. Lungs clear. ABD soft, non tender. No CVA tender. No focal neuro deficit.

## 2020-07-12 NOTE — ED PROVIDER NOTE - NSFOLLOWUPINSTRUCTIONS_ED_ALL_ED_FT
Hydrate.  Keep continue your current medications including headache medications as directed by your neurologist Dr. Harrison.   Read the discharge information of headache.   Please make appointment of MRI as directed your Dr.  Follow up with your neurologist Dr. Harrison for reevaluation in 2-3days, call tomorrow for appointment.   Return for any concerns or worsening symptoms.

## 2020-07-12 NOTE — ED PROVIDER NOTE - ATTENDING CONTRIBUTION TO CARE
Attending MD Jeter:  I personally have seen and examined this patient.  NP note reviewed and agree on plan of care and except where noted.  See HPI, PE, and MDM for details.

## 2020-07-12 NOTE — ED ADULT NURSE NOTE - CHPI ED NUR SYMPTOMS NEG
no loss of consciousness/no dizziness/no confusion/no blurred vision/no weakness/no change in level of consciousness/no vomiting/no fever/no numbness

## 2020-07-12 NOTE — ED PROVIDER NOTE - OBJECTIVE STATEMENT
59yo female pt with PMHx of DM on Metformin, Asthma, CVA no residual deficits (7years ago) presents to ED with headache/ nausea for one month. 61yo female pt with PMHx of DM on Metformin, Asthma, CVA no residual deficits (7years ago) presents to ED with headache/ nausea for one month. Pt stated she's had intermittent headache with nausea since one month ago and the pain's worsen for one week. Pt's evaluated by neurologist Dr. Rosa Harrison and is on Trokendi, Fioricet, and Zofran without improvement. She also had MRI prescription from Dr. Harrison and has not scheduled yet. Described pain as constant generalized pressure pain with intermittent nausea and right ear pain. Denies dizziness, visual changes or vomiting. Denies tinnitus. Denies neck or back pain. Denies sensory changes or weakness to extremities. Denies CP/SOB/ABD pain. Denies urinary problems. Denies fever, chills, cough or congestion.

## 2020-07-14 ENCOUNTER — APPOINTMENT (OUTPATIENT)
Dept: NEUROLOGY | Facility: CLINIC | Age: 60
End: 2020-07-14
Payer: COMMERCIAL

## 2020-07-14 DIAGNOSIS — R51 HEADACHE: ICD-10-CM

## 2020-07-14 PROCEDURE — 99205 OFFICE O/P NEW HI 60 MIN: CPT | Mod: 95

## 2020-07-14 NOTE — DISCUSSION/SUMMARY
[FreeTextEntry1] : 60-year-old woman who is here for a second opinion regarding her headaches that is new onset about a month ago.  Patient was advised to forward the MRI results once she obtains it on Saturday.  Patient was advised to call my office to advise what medication she is already tried for the prophylaxis of her headaches that seems to be migrainous in nature.  She was also advised that sometimes the prophylactic medications need at least 4 to 6 weeks to become therapeutic. \par \par physician location: home\par patient location: home\par \par I spent 60 minutes of total time, during which more than 50% of the time was spent on counseling. I explained the diagnosis, other possible diagnoses, workup, and management, as well as answered any questions.\par \par This is a telemedicine visit that was performed using real time 2-way audiovisual technology. Verbal consent to participate in video visit was obtained and patient was aware of their right to refuse to participate in services delivered via telemedicine and the alternative and potential limitations of participating in a telemedicine visit vs a face-to-face visit; I have also informed the patient of my current location in the [facility] and the names of all persons participating in the telemedicine service and their role in the encounter. The patient agrees to have this service via Telehealth.\par \par  This visit occurred during the Coronavirus (COVID-19) Public Health Emergency. I discussed with the patient the nature of our telemedicine visits, that: \par • I would evaluate the patient and recommend diagnostics and treatments based on my assessment \par • Our sessions are not being recorded and that personal health information is protected \par • Our team would provide follow up care in person if/when the patient needs it.\par

## 2020-07-14 NOTE — HISTORY OF PRESENT ILLNESS
[FreeTextEntry1] : verbal consent given on 07/14/2020 and 15:40  by EPIFANIO CARLTON at [address]\[place] [address]\par \par 60-year-old woman who started experiencing headache about a month ago.  Patient denies any trauma or any medication changes that might have triggered this.  Patient does have a lot of anxiety from COVID-19.  Patient states that initially when the headache first started the Excedrin usually helps alleviate the symptoms.  The headaches are located on the top of her head involving the right ear\par Quality pounding\par Severity 10 out of 10 at times\par She has tried Advil and Excedrin around the clock\par Associated with nausea and photophobia and phonophobia.  No TACs.\par \par Patient has an MRI Saturday that was ordered by her current neurologist.  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  Work-up was done at [place] and she currently takes aspirin when she remembers.  She was advised to take aspirin every day.  She is currently not on a statin because her cholesterol is "fine."\par \par CONSENT FOR VIDEO MEDICAL VISIT1. I understand that my physician wishes me to engage in a telemedicine consultation.2. My physician has explained to me how the video conferencing technology will be used to affect such a consultation will not be the same as a direct patient/health care provider visit due to the fact that I will not be in the same room as my physician 3. I understand there are potential risks to this technology, including interruptions, unauthorized access and technical difficulties. I understand that my health care provider or I can discontinue the telemedicine consult/visit if it is felt that the videoconferencing connections are not adequate for the situation.4. I understand that my healthcare information may be shared with other individuals for scheduling and billing purposes. Others may also be present during the consultation other than my physician and consulting health care provider in order to operate the video equipment. The above mentioned people will all maintain confidentiality of the information obtained. I further understand that I will be informed of their presence in the consultation and thus will have the right to request the following: (1) omit specific details of my medical history/physical examination that are personally sensitive to me; (2) ask non-medical personnel to leave the telemedicine examination room: and or (3) terminate the consultation at any time.5. I have had the alternatives to a telemedicine consultation explained to me, and in choosing to participate in a telemedicine consultation. I understand that some parts of the exam involving physical tests may be conducted by individuals at my location at the direction of the consulting health care provider.6. In an emergent consultation, I understand that the responsibility of the telemedicine consulting specialist is to advise my local practitioner and that the specialist’s responsibility will conclude upon the termination of the video conference connection.7. I understand that billing will occur from both my physician and as a facility fee from the site from which I am presented.8. I have had a direct conversation with my physician, during which I had the opportunity to ask questions in regard to this procedure. My questions have been answered and the risks, benefits and any practical alternatives have been discussed with me in a language in which I understand\par

## 2020-07-14 NOTE — PHYSICAL EXAM
[Person] : oriented to person [Place] : oriented to place [Time] : oriented to time [Short Term Intact] : short term memory intact [Fluency] : fluency intact [Current Events] : adequate knowledge of current events [Cranial Nerves Oculomotor (III)] : extraocular motion intact [Cranial Nerves Facial (VII)] : face symmetrical [Cranial Nerves Vestibulocochlear (VIII)] : hearing was intact bilaterally [Cranial Nerves Accessory (XI - Cranial And Spinal)] : head turning and shoulder shrug symmetric [Cranial Nerves Hypoglossal (XII)] : there was no tongue deviation with protrusion [Paresis Pronator Drift Left-Sided] : no pronator drift on the left [Paresis Pronator Drift Right-Sided] : no pronator drift on the right [Coordination - Dysmetria Impaired Finger-to-Nose Bilateral] : not present [Extraocular Movements] : extraocular movements were intact [Abnormal Walk] : normal gait [] : no rash

## 2020-09-23 NOTE — ED ADULT TRIAGE NOTE - TEMPERATURE IN CELSIUS (DEGREES C)
Mid-Level Procedure Text (A): After obtaining clear surgical margins the patient was sent to a mid-level provider for surgical repair.  The patient understands they will receive post-surgical care and follow-up from the mid-level provider. 36.8

## 2020-09-29 ENCOUNTER — APPOINTMENT (OUTPATIENT)
Dept: PULMONOLOGY | Facility: CLINIC | Age: 60
End: 2020-09-29
Payer: COMMERCIAL

## 2020-09-29 VITALS
DIASTOLIC BLOOD PRESSURE: 80 MMHG | TEMPERATURE: 97.4 F | SYSTOLIC BLOOD PRESSURE: 145 MMHG | RESPIRATION RATE: 16 BRPM | OXYGEN SATURATION: 98 % | HEART RATE: 69 BPM

## 2020-09-29 PROCEDURE — 99214 OFFICE O/P EST MOD 30 MIN: CPT | Mod: 25

## 2020-09-29 PROCEDURE — 71046 X-RAY EXAM CHEST 2 VIEWS: CPT

## 2020-09-29 NOTE — PROCEDURE
[FreeTextEntry1] : CXR: clear lungs, no focal consolidation or pleural effusions, cardiac silhouette appears normal.  No bony abnormality.\par \par \par Prior exams reviewed:\par PFT: restrictive dysfunction without a significant bronchodilator response.  Lung volumes low with evidence of air trapping. DLCO normal when corrected for volumes.\par \par Exhaled nitric oxide: normal\par \par \par \par EXAM: CT CHEST \par \par \par PROCEDURE DATE: 12/26/2019 \par \par \par \par \par INTERPRETATION: Clinical information: Status post fall. Patient has left \par lower rib pain. Exam is compared to previous study of 4/17/2015. \par \par CT scan of the chest was obtained without administration of intravenous \par contrast. \par \par No hilar and/or mediastinal adenopathy is noted. \par \par Heart is enlarged in size. Calcification of the coronary arteries noted. No \par pericardial effusion is noted. \par \par No endobronchial lesions are noted. Lungs are clear. No pleural effusions \par are noted. \par \par Below the diaphragm, visualized portions of the abdomen demonstrate \par cholelithiasis. \par \par Visualized osseous structures are unremarkable. \par \par Impression: No rib fracture is noted. \par \par \par \par \par \par \par \par \par \par ARTIE RIOS M.D., ATTENDING RADIOLOGIST \par This document has been electronically signed. Dec 26 2019 12:24PM \par \par \par \par \par

## 2020-09-29 NOTE — HISTORY OF PRESENT ILLNESS
[TextBox_4] : currently only using albuterol \par feeling sob and tight chest recently\par currently undergoing cardiac harmon\par unclear if asthma\par no cough/fever/chills/sputum

## 2020-09-29 NOTE — ASSESSMENT
[FreeTextEntry1] : possible gerd?  will trial PPI if no improvement with inhaler and cards harmon normal

## 2020-10-14 ENCOUNTER — APPOINTMENT (OUTPATIENT)
Dept: PULMONOLOGY | Facility: CLINIC | Age: 60
End: 2020-10-14
Payer: COMMERCIAL

## 2020-10-14 VITALS
SYSTOLIC BLOOD PRESSURE: 127 MMHG | HEIGHT: 65 IN | OXYGEN SATURATION: 96 % | HEART RATE: 74 BPM | BODY MASS INDEX: 30.82 KG/M2 | WEIGHT: 185 LBS | DIASTOLIC BLOOD PRESSURE: 78 MMHG | TEMPERATURE: 97.6 F

## 2020-10-14 DIAGNOSIS — R49.0 DYSPHONIA: ICD-10-CM

## 2020-10-14 PROCEDURE — 99214 OFFICE O/P EST MOD 30 MIN: CPT

## 2020-10-14 RX ORDER — UMECLIDINIUM BROMIDE AND VILANTEROL TRIFENATATE 62.5; 25 UG/1; UG/1
62.5-25 POWDER RESPIRATORY (INHALATION) DAILY
Qty: 1 | Refills: 2 | Status: DISCONTINUED | COMMUNITY
Start: 2019-10-31 | End: 2020-10-14

## 2020-10-14 RX ORDER — OMEPRAZOLE 40 MG/1
40 CAPSULE, DELAYED RELEASE ORAL
Qty: 30 | Refills: 1 | Status: ACTIVE | COMMUNITY
Start: 2020-10-14 | End: 1900-01-01

## 2020-10-14 NOTE — HISTORY OF PRESENT ILLNESS
[TextBox_4] : on trelegy and now nebs and abx from primary\par symptoms have not improved\par still has chest tightness\par did not have echo yet

## 2020-10-14 NOTE — PHYSICAL EXAM
[No Acute Distress] : no acute distress [Normal S1, S2] : normal s1, s2 [Normal Appearance] : normal appearance [No Resp Distress] : no resp distress [Clear to Auscultation Bilaterally] : clear to auscultation bilaterally [No Cyanosis] : no cyanosis [No Clubbing] : no clubbing

## 2020-10-14 NOTE — PROCEDURE
[FreeTextEntry1] : Prior exams reviewed:\par CXR: clear lungs, no focal consolidation or pleural effusions, cardiac silhouette appears normal.  No bony abnormality.\par \par \par \par PFT: restrictive dysfunction without a significant bronchodilator response.  Lung volumes low with evidence of air trapping. DLCO normal when corrected for volumes.\par \par Exhaled nitric oxide: normal\par \par \par \par EXAM: CT CHEST \par \par \par PROCEDURE DATE: 12/26/2019 \par \par \par \par \par INTERPRETATION: Clinical information: Status post fall. Patient has left \par lower rib pain. Exam is compared to previous study of 4/17/2015. \par \par CT scan of the chest was obtained without administration of intravenous \par contrast. \par \par No hilar and/or mediastinal adenopathy is noted. \par \par Heart is enlarged in size. Calcification of the coronary arteries noted. No \par pericardial effusion is noted. \par \par No endobronchial lesions are noted. Lungs are clear. No pleural effusions \par are noted. \par \par Below the diaphragm, visualized portions of the abdomen demonstrate \par cholelithiasis. \par \par Visualized osseous structures are unremarkable. \par \par Impression: No rib fracture is noted. \par \par \par \par \par \par \par \par \par \par ARTIE RIOS M.D., ATTENDING RADIOLOGIST \par This document has been electronically signed. Dec 26 2019 12:24PM \par \par \par \par \par

## 2020-11-04 ENCOUNTER — APPOINTMENT (OUTPATIENT)
Dept: PULMONOLOGY | Facility: CLINIC | Age: 60
End: 2020-11-04
Payer: COMMERCIAL

## 2020-11-04 VITALS
WEIGHT: 180 LBS | HEART RATE: 76 BPM | DIASTOLIC BLOOD PRESSURE: 83 MMHG | TEMPERATURE: 98 F | OXYGEN SATURATION: 95 % | BODY MASS INDEX: 29.99 KG/M2 | SYSTOLIC BLOOD PRESSURE: 136 MMHG | HEIGHT: 65 IN

## 2020-11-04 DIAGNOSIS — K21.9 GASTRO-ESOPHAGEAL REFLUX DISEASE W/OUT ESOPHAGITIS: ICD-10-CM

## 2020-11-04 PROCEDURE — 99072 ADDL SUPL MATRL&STAF TM PHE: CPT

## 2020-11-04 PROCEDURE — 99214 OFFICE O/P EST MOD 30 MIN: CPT

## 2020-11-04 RX ORDER — MONTELUKAST 10 MG/1
10 TABLET, FILM COATED ORAL DAILY
Qty: 90 | Refills: 1 | Status: ACTIVE | COMMUNITY
Start: 1900-01-01 | End: 1900-01-01

## 2020-11-04 RX ORDER — ALBUTEROL SULFATE 90 UG/1
108 (90 BASE) INHALANT RESPIRATORY (INHALATION)
Qty: 3 | Refills: 1 | Status: ACTIVE | COMMUNITY
Start: 1900-01-01 | End: 1900-01-01

## 2020-11-04 NOTE — HISTORY OF PRESENT ILLNESS
[Never] : never [TextBox_4] : chest discomfort/tightness resolved after starting PPI\par feels much better\par saw cardiology--all good\par saw ENT--all good\par \par stopped trelegy\par has prn nebs\par on singulair daily feels this has helped as well

## 2021-03-03 ENCOUNTER — APPOINTMENT (OUTPATIENT)
Dept: PULMONOLOGY | Facility: CLINIC | Age: 61
End: 2021-03-03

## 2021-03-10 ENCOUNTER — APPOINTMENT (OUTPATIENT)
Dept: PULMONOLOGY | Facility: CLINIC | Age: 61
End: 2021-03-10
Payer: COMMERCIAL

## 2021-03-10 DIAGNOSIS — J45.909 UNSPECIFIED ASTHMA, UNCOMPLICATED: ICD-10-CM

## 2021-03-10 PROCEDURE — 99213 OFFICE O/P EST LOW 20 MIN: CPT | Mod: 95

## 2021-03-10 NOTE — HISTORY OF PRESENT ILLNESS
[Home] : at home, [unfilled] , at the time of the visit. [Medical Office: ([facility])___] : at the medical office located in  [Verbal consent obtained from patient] : the patient, [unfilled] [TextBox_4] : doing well\par self dc-ed singulair\par no sob/no cough\par slight FULLER with stairs\par no more gerd

## 2021-03-10 NOTE — ASSESSMENT
[FreeTextEntry1] : doing well\par will remain off singulair\par only use prn albuterol\par \par will let me know if symptoms worsen

## 2021-03-24 ENCOUNTER — APPOINTMENT (OUTPATIENT)
Dept: PULMONOLOGY | Facility: CLINIC | Age: 61
End: 2021-03-24

## 2021-12-17 ENCOUNTER — INPATIENT (INPATIENT)
Facility: HOSPITAL | Age: 61
LOS: 3 days | Discharge: HOME | DRG: 311 | End: 2021-12-21
Attending: INTERNAL MEDICINE | Admitting: INTERNAL MEDICINE
Payer: COMMERCIAL

## 2021-12-17 VITALS
HEIGHT: 65 IN | WEIGHT: 179.9 LBS | RESPIRATION RATE: 18 BRPM | OXYGEN SATURATION: 98 % | DIASTOLIC BLOOD PRESSURE: 84 MMHG | SYSTOLIC BLOOD PRESSURE: 146 MMHG | TEMPERATURE: 98 F | HEART RATE: 71 BPM

## 2021-12-17 DIAGNOSIS — Z29.9 ENCOUNTER FOR PROPHYLACTIC MEASURES, UNSPECIFIED: ICD-10-CM

## 2021-12-17 DIAGNOSIS — I10 ESSENTIAL (PRIMARY) HYPERTENSION: ICD-10-CM

## 2021-12-17 DIAGNOSIS — J45.909 UNSPECIFIED ASTHMA, UNCOMPLICATED: ICD-10-CM

## 2021-12-17 DIAGNOSIS — R07.9 CHEST PAIN, UNSPECIFIED: ICD-10-CM

## 2021-12-17 DIAGNOSIS — Z86.73 PERSONAL HISTORY OF TRANSIENT ISCHEMIC ATTACK (TIA), AND CEREBRAL INFARCTION WITHOUT RESIDUAL DEFICITS: ICD-10-CM

## 2021-12-17 DIAGNOSIS — E78.5 HYPERLIPIDEMIA, UNSPECIFIED: ICD-10-CM

## 2021-12-17 DIAGNOSIS — Z90.710 ACQUIRED ABSENCE OF BOTH CERVIX AND UTERUS: Chronic | ICD-10-CM

## 2021-12-17 DIAGNOSIS — Z98.890 OTHER SPECIFIED POSTPROCEDURAL STATES: Chronic | ICD-10-CM

## 2021-12-17 LAB
ALBUMIN SERPL ELPH-MCNC: 4.6 G/DL — SIGNIFICANT CHANGE UP (ref 3.3–5)
ALP SERPL-CCNC: 100 U/L — SIGNIFICANT CHANGE UP (ref 40–120)
ALT FLD-CCNC: 29 U/L — SIGNIFICANT CHANGE UP (ref 10–45)
ANION GAP SERPL CALC-SCNC: 12 MMOL/L — SIGNIFICANT CHANGE UP (ref 5–17)
AST SERPL-CCNC: 20 U/L — SIGNIFICANT CHANGE UP (ref 10–40)
BASOPHILS # BLD AUTO: 0.09 K/UL — SIGNIFICANT CHANGE UP (ref 0–0.2)
BASOPHILS NFR BLD AUTO: 1.2 % — SIGNIFICANT CHANGE UP (ref 0–2)
BILIRUB SERPL-MCNC: 0.4 MG/DL — SIGNIFICANT CHANGE UP (ref 0.2–1.2)
BUN SERPL-MCNC: 10 MG/DL — SIGNIFICANT CHANGE UP (ref 7–23)
CALCIUM SERPL-MCNC: 9.6 MG/DL — SIGNIFICANT CHANGE UP (ref 8.4–10.5)
CHLORIDE SERPL-SCNC: 104 MMOL/L — SIGNIFICANT CHANGE UP (ref 96–108)
CO2 SERPL-SCNC: 26 MMOL/L — SIGNIFICANT CHANGE UP (ref 22–31)
CREAT SERPL-MCNC: 0.76 MG/DL — SIGNIFICANT CHANGE UP (ref 0.5–1.3)
EOSINOPHIL # BLD AUTO: 0.68 K/UL — HIGH (ref 0–0.5)
EOSINOPHIL NFR BLD AUTO: 9 % — HIGH (ref 0–6)
GLUCOSE BLDC GLUCOMTR-MCNC: 121 MG/DL — HIGH (ref 70–99)
GLUCOSE SERPL-MCNC: 175 MG/DL — HIGH (ref 70–99)
HCT VFR BLD CALC: 47.2 % — HIGH (ref 34.5–45)
HGB BLD-MCNC: 15 G/DL — SIGNIFICANT CHANGE UP (ref 11.5–15.5)
IMM GRANULOCYTES NFR BLD AUTO: 0.7 % — SIGNIFICANT CHANGE UP (ref 0–1.5)
LYMPHOCYTES # BLD AUTO: 3.57 K/UL — HIGH (ref 1–3.3)
LYMPHOCYTES # BLD AUTO: 47.2 % — HIGH (ref 13–44)
MCHC RBC-ENTMCNC: 28.8 PG — SIGNIFICANT CHANGE UP (ref 27–34)
MCHC RBC-ENTMCNC: 31.8 GM/DL — LOW (ref 32–36)
MCV RBC AUTO: 90.6 FL — SIGNIFICANT CHANGE UP (ref 80–100)
MONOCYTES # BLD AUTO: 0.65 K/UL — SIGNIFICANT CHANGE UP (ref 0–0.9)
MONOCYTES NFR BLD AUTO: 8.6 % — SIGNIFICANT CHANGE UP (ref 2–14)
NEUTROPHILS # BLD AUTO: 2.52 K/UL — SIGNIFICANT CHANGE UP (ref 1.8–7.4)
NEUTROPHILS NFR BLD AUTO: 33.3 % — LOW (ref 43–77)
NRBC # BLD: 0 /100 WBCS — SIGNIFICANT CHANGE UP (ref 0–0)
PLATELET # BLD AUTO: 266 K/UL — SIGNIFICANT CHANGE UP (ref 150–400)
POTASSIUM SERPL-MCNC: 3.9 MMOL/L — SIGNIFICANT CHANGE UP (ref 3.5–5.3)
POTASSIUM SERPL-SCNC: 3.9 MMOL/L — SIGNIFICANT CHANGE UP (ref 3.5–5.3)
PROT SERPL-MCNC: 7.9 G/DL — SIGNIFICANT CHANGE UP (ref 6–8.3)
RBC # BLD: 5.21 M/UL — HIGH (ref 3.8–5.2)
RBC # FLD: 13.2 % — SIGNIFICANT CHANGE UP (ref 10.3–14.5)
SARS-COV-2 RNA SPEC QL NAA+PROBE: SIGNIFICANT CHANGE UP
SODIUM SERPL-SCNC: 142 MMOL/L — SIGNIFICANT CHANGE UP (ref 135–145)
TROPONIN T, HIGH SENSITIVITY RESULT: <6 NG/L — SIGNIFICANT CHANGE UP (ref 0–51)
WBC # BLD: 7.56 K/UL — SIGNIFICANT CHANGE UP (ref 3.8–10.5)
WBC # FLD AUTO: 7.56 K/UL — SIGNIFICANT CHANGE UP (ref 3.8–10.5)

## 2021-12-17 PROCEDURE — 99285 EMERGENCY DEPT VISIT HI MDM: CPT | Mod: CS

## 2021-12-17 PROCEDURE — 93010 ELECTROCARDIOGRAM REPORT: CPT

## 2021-12-17 PROCEDURE — 99223 1ST HOSP IP/OBS HIGH 75: CPT

## 2021-12-17 PROCEDURE — 71046 X-RAY EXAM CHEST 2 VIEWS: CPT | Mod: 26

## 2021-12-17 RX ORDER — ACETAMINOPHEN 500 MG
975 TABLET ORAL ONCE
Refills: 0 | Status: COMPLETED | OUTPATIENT
Start: 2021-12-17 | End: 2021-12-17

## 2021-12-17 RX ORDER — DEXTROSE 50 % IN WATER 50 %
12.5 SYRINGE (ML) INTRAVENOUS ONCE
Refills: 0 | Status: DISCONTINUED | OUTPATIENT
Start: 2021-12-17 | End: 2021-12-21

## 2021-12-17 RX ORDER — BUDESONIDE AND FORMOTEROL FUMARATE DIHYDRATE 160; 4.5 UG/1; UG/1
2 AEROSOL RESPIRATORY (INHALATION)
Qty: 0 | Refills: 0 | DISCHARGE

## 2021-12-17 RX ORDER — LISINOPRIL 2.5 MG/1
1 TABLET ORAL
Qty: 0 | Refills: 0 | DISCHARGE

## 2021-12-17 RX ORDER — ENOXAPARIN SODIUM 100 MG/ML
40 INJECTION SUBCUTANEOUS DAILY
Refills: 0 | Status: DISCONTINUED | OUTPATIENT
Start: 2021-12-17 | End: 2021-12-21

## 2021-12-17 RX ORDER — SODIUM CHLORIDE 9 MG/ML
1000 INJECTION, SOLUTION INTRAVENOUS
Refills: 0 | Status: DISCONTINUED | OUTPATIENT
Start: 2021-12-17 | End: 2021-12-21

## 2021-12-17 RX ORDER — ACETAMINOPHEN 500 MG
650 TABLET ORAL EVERY 6 HOURS
Refills: 0 | Status: DISCONTINUED | OUTPATIENT
Start: 2021-12-17 | End: 2021-12-21

## 2021-12-17 RX ORDER — TIOTROPIUM BROMIDE 18 UG/1
2 CAPSULE ORAL; RESPIRATORY (INHALATION)
Qty: 0 | Refills: 0 | DISCHARGE

## 2021-12-17 RX ORDER — MONTELUKAST 4 MG/1
10 TABLET, CHEWABLE ORAL DAILY
Refills: 0 | Status: DISCONTINUED | OUTPATIENT
Start: 2021-12-17 | End: 2021-12-21

## 2021-12-17 RX ORDER — INSULIN LISPRO 100/ML
VIAL (ML) SUBCUTANEOUS
Refills: 0 | Status: DISCONTINUED | OUTPATIENT
Start: 2021-12-17 | End: 2021-12-21

## 2021-12-17 RX ORDER — INFLUENZA VIRUS VACCINE 15; 15; 15; 15 UG/.5ML; UG/.5ML; UG/.5ML; UG/.5ML
0.5 SUSPENSION INTRAMUSCULAR ONCE
Refills: 0 | Status: DISCONTINUED | OUTPATIENT
Start: 2021-12-17 | End: 2021-12-21

## 2021-12-17 RX ORDER — METFORMIN HYDROCHLORIDE 850 MG/1
1 TABLET ORAL
Qty: 0 | Refills: 0 | DISCHARGE

## 2021-12-17 RX ORDER — DEXTROSE 50 % IN WATER 50 %
25 SYRINGE (ML) INTRAVENOUS ONCE
Refills: 0 | Status: DISCONTINUED | OUTPATIENT
Start: 2021-12-17 | End: 2021-12-21

## 2021-12-17 RX ORDER — ASPIRIN/CALCIUM CARB/MAGNESIUM 324 MG
324 TABLET ORAL ONCE
Refills: 0 | Status: COMPLETED | OUTPATIENT
Start: 2021-12-17 | End: 2021-12-17

## 2021-12-17 RX ORDER — DEXTROSE 50 % IN WATER 50 %
15 SYRINGE (ML) INTRAVENOUS ONCE
Refills: 0 | Status: DISCONTINUED | OUTPATIENT
Start: 2021-12-17 | End: 2021-12-21

## 2021-12-17 RX ORDER — INSULIN LISPRO 100/ML
VIAL (ML) SUBCUTANEOUS AT BEDTIME
Refills: 0 | Status: DISCONTINUED | OUTPATIENT
Start: 2021-12-17 | End: 2021-12-21

## 2021-12-17 RX ORDER — GLUCAGON INJECTION, SOLUTION 0.5 MG/.1ML
1 INJECTION, SOLUTION SUBCUTANEOUS ONCE
Refills: 0 | Status: DISCONTINUED | OUTPATIENT
Start: 2021-12-17 | End: 2021-12-21

## 2021-12-17 RX ORDER — ASPIRIN/CALCIUM CARB/MAGNESIUM 324 MG
81 TABLET ORAL DAILY
Refills: 0 | Status: DISCONTINUED | OUTPATIENT
Start: 2021-12-18 | End: 2021-12-21

## 2021-12-17 RX ORDER — LISINOPRIL 2.5 MG/1
10 TABLET ORAL DAILY
Refills: 0 | Status: DISCONTINUED | OUTPATIENT
Start: 2021-12-17 | End: 2021-12-21

## 2021-12-17 RX ORDER — ALBUTEROL 90 UG/1
2 AEROSOL, METERED ORAL EVERY 6 HOURS
Refills: 0 | Status: DISCONTINUED | OUTPATIENT
Start: 2021-12-17 | End: 2021-12-21

## 2021-12-17 RX ADMIN — Medication 975 MILLIGRAM(S): at 23:41

## 2021-12-17 RX ADMIN — Medication 975 MILLIGRAM(S): at 17:21

## 2021-12-17 RX ADMIN — Medication 324 MILLIGRAM(S): at 17:21

## 2021-12-17 NOTE — H&P ADULT - ASSESSMENT
This patient is a 61yoF with PHM of T2DM, asthma, CVA, GERD, uterine CA s/p ALY-BSO and radiation, htn who presents to the ED with complaint of chest pain. The patient had new onset chest pain at about 5AM this morning which she described as burning and tightness sensation. Pain radiated to the right side of the chest and back. The patient denies any dizziness, lightheadedness, palpitations, or dyspnea. She also denies any recent fever or chills. Despite rubbing her chest, pain occurred intermittently throughout the day, prompting her to visited urgent care center. Patient had EKG done and was advised to come to the ED.  The patient denies history of similar chest pain. She reports she had stress test in the last 6-9 months which was reportedly "ok." She states she can walk 10 blocks and climb 2 flights of stairs without dyspnea, CP or palpitations.

## 2021-12-17 NOTE — ED PROVIDER NOTE - NSICDXPASTSURGICALHX_GEN_ALL_CORE_FT
PAST SURGICAL HISTORY:  H/O carpal tunnel repair Right ( 2015)    H/O total hysterectomy with bilateral salpingo-oophorectomy (BSO) (in 1999 for diagnosis of uterine cancer)    History of Hernia Repair 10 years ago

## 2021-12-17 NOTE — PATIENT PROFILE ADULT - MEDICATIONS/VISITS
Pt is past due for f/u pap smear.  [facility] clinic and schedule.  Maria Teresa Borjas,    Pap Tracking      
no

## 2021-12-17 NOTE — H&P ADULT - REASON FOR ADMISSION
chest pain
received pt stable awake alert oriented x4 no distress c/o vomiting pt reavaluated and medicated with reglan and ivf in progress davonte griffin drawned and sent to lab

## 2021-12-17 NOTE — ED PROVIDER NOTE - NSICDXPASTMEDICALHX_GEN_ALL_CORE_FT
PAST MEDICAL HISTORY:  Asthma PRN meds - denies any h/o asthma exacerbation    Diabetes Mellitus Type 2    GERD (gastroesophageal reflux disease)     History of Bell's palsy     History of chest pain 2013 -all cardiac test was normal -as per patient    HLD (hyperlipidemia) no meds    Hypertension no medication - as per patient blood pressure is normal    Incisional hernia without obstruction or gangrene     Pneumonia April 2015    Seasonal allergies     Transient ischemic attack July 2014    Uterine Cancer (diagnosed in 1999; subsequent TAHBSO and RTX)    Vitamin D deficiency

## 2021-12-17 NOTE — ED ADULT NURSE NOTE - OBJECTIVE STATEMENT
Patient is a 60year old female who presented to the ED with complaint of left-sided chest pain w/ radiation to the right chest X12 hrs.  Admits to chronic SOB., denied fever/chills, headache, nausea/vomiting. She is alert oriented and appears to be in no acute distress. 18F IV to right antecubital area, blood sent to lab, medicated as ordered. Shawn

## 2021-12-17 NOTE — PATIENT PROFILE ADULT - FALL HARM RISK - UNIVERSAL INTERVENTIONS
Bed in lowest position, wheels locked, appropriate side rails in place/Call bell, personal items and telephone in reach/Instruct patient to call for assistance before getting out of bed or chair/Non-slip footwear when patient is out of bed/[place] to call system/Physically safe environment - no spills, clutter or unnecessary equipment/Purposeful Proactive Rounding/Room/bathroom lighting operational, light cord in reach

## 2021-12-17 NOTE — ED PROVIDER NOTE - ATTENDING CONTRIBUTION TO CARE
Patient is a 62 yo F with history of DM, HTN, CVA, hx of MI, asthma here for left sided chest pain described as sometimes sharp, achy, pressure like. She called Dr. Maher's office who told her to come to the emergency department. Patient denies chest pain with exertion. She reports some Patient is a 60 yo F with history of DM, HTN, CVA, hx of MI, asthma, hx of uterine CA s/p hysterectomy and b/l salpingectomy/ oopherectomy here for left sided chest pain described as sometimes sharp, achy, pressure like since this morning. She called Dr. Maher's office who told her to come to the emergency department. Patient denies chest pain with exertion. She reports some shortness of breath with exertion that she relates to her asthma. No numbness or tingling in arms or neck. No abdominal pain. She has chronic RLE swelling after surgery for her uterine CA. No hx of DVT/PE. No fevers, chills, nausea, vomiting. She reports lifting heavy groceries yesterday. No pain with movement.     VS noted  Gen. no acute distress, Non toxic   HEENT: EOMI, mmm  Lungs: CTAB/L no C/ W /R   CVS: RRR   Abd; Soft non tender, non distended   Ext: RLE with edema compared to LLE, no calf tenderness  Skin: no rash  Neuro AAOx3 non focal clear speech  a/p: chest pain, left sided - ekg wnl. Plan for ACS work up. Will reach out to Dr. Maher's office.   - Nehemiah PIEDRA

## 2021-12-17 NOTE — H&P ADULT - PROBLEM SELECTOR PLAN 1
Differential diagnosis includes but is not limited to unstable angina, gastritis, GERD, esophagitis, PUD.  Patient received tylenol and aspirin 324mg in the ED.  Monitor on telemetry  Trend troponin.  Check exercise stress test.  Check A1C and lipid panel.  Notify Dr. Maher in AM of patient's admission.

## 2021-12-17 NOTE — ED PROVIDER NOTE - CLINICAL SUMMARY MEDICAL DECISION MAKING FREE TEXT BOX
59yo female pt presents due to left-sided chest pain. Will r/o ACS and screen for PTX w/ labs, CXR, and trops. Will provide ASA and pain meds and reassess patient.

## 2021-12-17 NOTE — H&P ADULT - NSHPPHYSICALEXAM_GEN_ALL_CORE
T(C): 36.4 (12-17-21 @ 18:45), Max: 36.4 (12-17-21 @ 13:38)  HR: 67 (12-17-21 @ 18:45) (67 - 71)  BP: 138/80 (12-17-21 @ 18:45) (138/80 - 146/84)  RR: 16 (12-17-21 @ 18:45) (16 - 18)  SpO2: 98% (12-17-21 @ 18:45) (98% - 98%)  Wt(kg): --    PHYSICAL EXAM:  GENERAL: NAD, well-groomed, well-developed  HEAD:  Atraumatic, Normocephalic  EYES: EOMI, PERRLA, conjunctiva and sclera clear  ENMT: No oropharyngeal exudates, erythema or lesions,  Moist mucous membranes, good dentition  NECK: Supple, no cervical lymphadenopathy, no JVD  NERVOUS SYSTEM:  Alert & Oriented X3, CN II-XII intact, 5/5 BUE and BLE motor strength, full sensation to light touch   CHEST/LUNG: Clear to auscultation bilaterally; No rales, no  rhonchi, no wheezing  HEART: Regular rate and rhythm; No murmurs, rubs, or gallops  ABDOMEN: Soft, Nontender, Nondistended  EXTREMITIES:  2+ radial Pulses, No cyanosis or edema  SKIN: warm, dry T(C): 36.4 (12-17-21 @ 18:45), Max: 36.4 (12-17-21 @ 13:38)  HR: 67 (12-17-21 @ 18:45) (67 - 71)  BP: 138/80 (12-17-21 @ 18:45) (138/80 - 146/84)  RR: 16 (12-17-21 @ 18:45) (16 - 18)  SpO2: 98% (12-17-21 @ 18:45) (98% - 98%)  Wt(kg): --    PHYSICAL EXAM:  GENERAL: NAD, well-groomed, well-developed  HEAD:  Atraumatic, Normocephalic  EYES: EOMI, PERRLA, conjunctiva and sclera clear  ENMT: No oropharyngeal exudates, erythema or lesions,  Moist mucous membranes  NECK: Supple, no cervical lymphadenopathy, no JVD  NERVOUS SYSTEM:  Alert & Oriented X3, CN II-XII intact, 5/5 BUE and BLE motor strength, full sensation to light touch   CHEST/LUNG: Clear to auscultation bilaterally; No rales, no  rhonchi, no wheezing  HEART: Regular rate and rhythm; No murmurs  ABDOMEN: Soft, Nontender, Nondistended  EXTREMITIES:  2+ radial Pulses, No cyanosis or edema  SKIN: warm, dry

## 2021-12-17 NOTE — ED PROVIDER NOTE - NS ED ROS FT
CONSTITUTIONAL: No fever, weight loss, or fatigue  EYES: No eye pain, visual disturbances, or discharge  ENMT:  No difficulty hearing, tinnitus, vertigo; No sinus or throat pain  NECK: No pain or stiffness  RESPIRATORY: No cough, wheezing, chills or hemoptysis; + shortness of breath  CARDIOVASCULAR: + chest pain, no palpitations, dizziness, or leg swelling  GASTROINTESTINAL: No abdominal or epigastric pain. No nausea, vomiting, or hematemesis; No diarrhea or constipation. No melena or hematochezia.  GENITOURINARY: No dysuria, frequency, hematuria, or incontinence  NEUROLOGICAL: No headaches, memory loss, loss of strength, numbness, or tremors  SKIN: No itching, burning, rashes, or lesions

## 2021-12-17 NOTE — H&P ADULT - PROBLEM SELECTOR PLAN 4
The patient reports history of CVA  without residual deficits however denies taking daily aspirin or statin.  Start baby aspirin. Will check lipid panel as noted above.

## 2021-12-17 NOTE — H&P ADULT - NSHPREVIEWOFSYSTEMS_GEN_ALL_CORE
REVIEW OF SYSTEMS  CONSTITUTIONAL: No fever, no chills, no fatigue  EYES: No eye pain, no vision changes  ENMT:  No difficulty hearing, no throat pain  RESPIRATORY: No cough, no wheezing, no sputum production; No shortness of breath  CARDIOVASCULAR: No chest pain, no palpitations, no FULLER, no leg swelling  GASTROINTESTINAL: No abdominal pain, no nausea, no vomiting, no hematemesis, no diarrhea, no constipation, no melena, no hematochezia.  GENITOURINARY: No dysuria, no hematuria  NEUROLOGICAL: No headaches, no loss of strength, no numbness  SKIN: No itching, no rashes, no lesions   MUSCULOSKELETAL: No joint pain, no joint swelling; No muscle pain  HEME/LYMPH: No easy bruising, bleeding REVIEW OF SYSTEMS  CONSTITUTIONAL: No fever, + chills  EYES: No eye pain, no vision changes  ENMT:  No difficulty hearing, no throat pain  RESPIRATORY: mild dry cough, no sputum production; No shortness of breath  CARDIOVASCULAR: + chest pain, no palpitations, no FULLER, + chronic RLE edema 2/2 lymph node dissection  GASTROINTESTINAL: No abdominal pain, no nausea, no vomiting, no diarrhea, no bloody stool  GENITOURINARY: No dysuria, no hematuria  NEUROLOGICAL: No headaches, no loss of strength, no numbness  SKIN: No itching, no rashes, no lesions   MUSCULOSKELETAL: No joint pain, no joint swelling; No muscle pain  HEME/LYMPH: No easy bruising, bleeding

## 2021-12-17 NOTE — ED PROVIDER NOTE - OBJECTIVE STATEMENT
59yo female pt with PMHx of DM on Metformin, Asthma, CVA no residual deficits (7years ago) presents to ED with 59yo female pt with PMHx of DM on Metformin, Asthma, CVA no residual deficits (-8 years ago) presents to ED with 12 hour history of intermittent 7/10 left-sided chest pain w/ radiation to the right chest , unrelieved or worsened by any factors. Admits to chronic SOB. Pain began at rest. Denies fevers, chills, nausea, vomiting, dizziness, recent illness, abdominal pain, dysuria, hematuria, dyschezia, hematochezia.

## 2021-12-17 NOTE — ED ADULT NURSE NOTE - IS THE PATIENT ABLE TO BE SCREENED?
NONPROLIFERATIVE DIABETIC RETINOPATHY, OU:  STABLE.  RETURN FOR FOLLOW-UP AS SCHEDULED FOR DILATED EYE EXAM. Yes

## 2021-12-17 NOTE — H&P ADULT - NSHPLABSRESULTS_GEN_ALL_CORE
Labs, personally reviewed by me.     CBC notable for elevated lymphocytes and eosinophils. CMP is unremarkable.  HS troponin T < 6.     LABS:                        15.0   7.56  )-----------( 266      ( 17 Dec 2021 17:33 )             47.2     Hgb Trend: 15.0<--  12-17    142  |  104  |  10  ----------------------------<  175<H>  3.9   |  26  |  0.76    Ca    9.6      17 Dec 2021 17:33    TPro  7.9  /  Alb  4.6  /  TBili  0.4  /  DBili  x   /  AST  20  /  ALT  29  /  AlkPhos  100  12-17    Creatinine Trend: 0.76<--    < from: Xray Chest 2 Views PA/Lat (12.17.21 @ 17:36) >    FINDINGS:    The heart is normal in size.  The lungs are clear.  There is no pneumothorax or pleural effusion.    IMPRESSION:  Clear lungs.    --- End of Report ---    < end of copied text >

## 2021-12-17 NOTE — H&P ADULT - NSHPADDITIONALINFOADULT_GEN_ALL_CORE
Patient assigned to me by night hospitalist in charge for management and care for patient for this evening only. Care to be continued by day hospitalist in the morning and thereafter.  Hailey Reeves MD [phone]

## 2021-12-17 NOTE — H&P ADULT - PROBLEM SELECTOR PLAN 3
Patient reports asthma has been well controlled and she uses her albuterol rescue inhaler less than once a week and never at night. She does not have audible wheezing at this time and is breathing comfortable on room air.   Continue singulair. Continue albuterol PRN.

## 2021-12-18 LAB
A1C WITH ESTIMATED AVERAGE GLUCOSE RESULT: 7.2 % — HIGH (ref 4–5.6)
CHOLEST SERPL-MCNC: 213 MG/DL — HIGH
CK MB BLD-MCNC: 1.4 % — SIGNIFICANT CHANGE UP (ref 0–3.5)
CK MB CFR SERPL CALC: 1.8 NG/ML — SIGNIFICANT CHANGE UP (ref 0–3.8)
CK SERPL-CCNC: 131 U/L — SIGNIFICANT CHANGE UP (ref 25–170)
ESTIMATED AVERAGE GLUCOSE: 160 MG/DL — HIGH (ref 68–114)
GLUCOSE BLDC GLUCOMTR-MCNC: 115 MG/DL — HIGH (ref 70–99)
GLUCOSE BLDC GLUCOMTR-MCNC: 119 MG/DL — HIGH (ref 70–99)
GLUCOSE BLDC GLUCOMTR-MCNC: 126 MG/DL — HIGH (ref 70–99)
GLUCOSE BLDC GLUCOMTR-MCNC: 202 MG/DL — HIGH (ref 70–99)
HCV AB S/CO SERPL IA: 0.21 S/CO — SIGNIFICANT CHANGE UP (ref 0–0.99)
HCV AB SERPL-IMP: SIGNIFICANT CHANGE UP
HDLC SERPL-MCNC: 53 MG/DL — SIGNIFICANT CHANGE UP
LIPID PNL WITH DIRECT LDL SERPL: 139 MG/DL — HIGH
NON HDL CHOLESTEROL: 160 MG/DL — HIGH
TRIGL SERPL-MCNC: 102 MG/DL — SIGNIFICANT CHANGE UP
TROPONIN T, HIGH SENSITIVITY RESULT: <6 NG/L — SIGNIFICANT CHANGE UP (ref 0–51)

## 2021-12-18 RX ORDER — ALBUTEROL 90 UG/1
2 AEROSOL, METERED ORAL
Qty: 0 | Refills: 0 | DISCHARGE

## 2021-12-18 RX ORDER — LISINOPRIL 2.5 MG/1
0 TABLET ORAL
Qty: 0 | Refills: 0 | DISCHARGE

## 2021-12-18 RX ORDER — ATORVASTATIN CALCIUM 80 MG/1
20 TABLET, FILM COATED ORAL AT BEDTIME
Refills: 0 | Status: DISCONTINUED | OUTPATIENT
Start: 2021-12-18 | End: 2021-12-18

## 2021-12-18 RX ADMIN — LISINOPRIL 10 MILLIGRAM(S): 2.5 TABLET ORAL at 05:11

## 2021-12-18 RX ADMIN — Medication 650 MILLIGRAM(S): at 12:00

## 2021-12-18 RX ADMIN — ENOXAPARIN SODIUM 40 MILLIGRAM(S): 100 INJECTION SUBCUTANEOUS at 11:13

## 2021-12-18 RX ADMIN — Medication 81 MILLIGRAM(S): at 11:11

## 2021-12-18 RX ADMIN — Medication 650 MILLIGRAM(S): at 11:12

## 2021-12-18 RX ADMIN — MONTELUKAST 10 MILLIGRAM(S): 4 TABLET, CHEWABLE ORAL at 11:11

## 2021-12-18 NOTE — CONSULT NOTE ADULT - ASSESSMENT
angina pectoris  ruled out for acute MI   asa  statin   obtain coronary cta    HTN  cont current meds    Dm II  Monitor finger stick. Insulin coverage. Diabetic education and Diabetic diet. Consider nutrition consultation.

## 2021-12-18 NOTE — CONSULT NOTE ADULT - SUBJECTIVE AND OBJECTIVE BOX
CHIEF COMPLAINT:Patient is a 61y old  Female who presents with a chief complaint of chest pain (17 Dec 2021 20:57)      HISTORY OF PRESENT ILLNESS:    61 female with HTN, DM II, TIA presents with intermittent chest pain pressure like  now feels better  no sob  no dizziness  no syncope     PAST MEDICAL & SURGICAL HISTORY:  Diabetes Mellitus  Type 2    Uterine Cancer  (diagnosed in 1999; subsequent TAHBSO and RTX)    GERD (gastroesophageal reflux disease)    Seasonal allergies    Pneumonia  April 2015    Vitamin D deficiency    Transient ischemic attack  July 2014    HLD (hyperlipidemia)  no meds    Hypertension  no medication - as per patient blood pressure is normal    Asthma  PRN meds - denies any h/o asthma exacerbation    Incisional hernia without obstruction or gangrene    History of Bell&#x27;s palsy    History of chest pain  2013 -all cardiac test was normal -as per patient    History of Hernia Repair  10 years ago    H/O total hysterectomy with bilateral salpingo-oophorectomy (BSO)  (in 1999 for diagnosis of uterine cancer)    H/O carpal tunnel repair  Right ( 2015)            MEDICATIONS:  aspirin enteric coated 81 milliGRAM(s) Oral daily  enoxaparin Injectable 40 milliGRAM(s) SubCutaneous daily  lisinopril 10 milliGRAM(s) Oral daily      ALBUTerol    90 MICROgram(s) HFA Inhaler 2 Puff(s) Inhalation every 6 hours PRN  montelukast 10 milliGRAM(s) Oral daily    acetaminophen     Tablet .. 650 milliGRAM(s) Oral every 6 hours PRN      dextrose 40% Gel 15 Gram(s) Oral once  dextrose 50% Injectable 25 Gram(s) IV Push once  dextrose 50% Injectable 12.5 Gram(s) IV Push once  dextrose 50% Injectable 25 Gram(s) IV Push once  glucagon  Injectable 1 milliGRAM(s) IntraMuscular once  insulin lispro (ADMELOG) corrective regimen sliding scale   SubCutaneous three times a day before meals  insulin lispro (ADMELOG) corrective regimen sliding scale   SubCutaneous at bedtime    dextrose 5%. 1000 milliLiter(s) IV Continuous <Continuous>  dextrose 5%. 1000 milliLiter(s) IV Continuous <Continuous>  influenza   Vaccine 0.5 milliLiter(s) IntraMuscular once      FAMILY HISTORY:  No pertinent family history in first degree relatives        Non-contributory    SOCIAL HISTORY:    No tobacco, drugs or etoh    Allergies    Allergy Status Unknown    Intolerances    metformin (Diarrhea)  	    REVIEW OF SYSTEMS:  as above  The rest of the 14 points ROS reviewed and except above they are unremarkable.        PHYSICAL EXAM:  T(C): 36.5 (12-18-21 @ 04:43), Max: 36.5 (12-18-21 @ 04:43)  HR: 60 (12-18-21 @ 04:43) (57 - 73)  BP: 144/80 (12-18-21 @ 04:43) (130/82 - 158/84)  RR: 18 (12-18-21 @ 04:43) (16 - 18)  SpO2: 100% (12-18-21 @ 04:43) (97% - 100%)  Wt(kg): --  I&O's Summary      Appearance: Normal	  HEENT:   no gross abnormality   Cardiovascular: Normal S1 S2,    Murmur:   Neck: JVP normal  Respiratory: Lungs clear   Gastrointestinal:  Soft, Non-tender  Skin: normal   Neuro: No gross deficits.   Psychiatry:  Mood & affect flat  Ext: No edema    LABS/DATA:    TELEMETRY: 	    ECG:  	   	  CARDIAC MARKERS:                        <6 <<== 12-18-21 @ 07:16                <6 <<== 12-17-21 @ 17:33                              15.0   7.56  )-----------( 266      ( 17 Dec 2021 17:33 )             47.2     12-17    142  |  104  |  10  ----------------------------<  175<H>  3.9   |  26  |  0.76    Ca    9.6      17 Dec 2021 17:33    TPro  7.9  /  Alb  4.6  /  TBili  0.4  /  DBili  x   /  AST  20  /  ALT  29  /  AlkPhos  100  12-17    proBNP:   Lipid Profile:   HgA1c:   TSH:

## 2021-12-19 LAB
ANION GAP SERPL CALC-SCNC: 14 MMOL/L — SIGNIFICANT CHANGE UP (ref 5–17)
BUN SERPL-MCNC: 14 MG/DL — SIGNIFICANT CHANGE UP (ref 7–23)
CALCIUM SERPL-MCNC: 9.6 MG/DL — SIGNIFICANT CHANGE UP (ref 8.4–10.5)
CHLORIDE SERPL-SCNC: 105 MMOL/L — SIGNIFICANT CHANGE UP (ref 96–108)
CHOLEST SERPL-MCNC: 223 MG/DL — HIGH
CO2 SERPL-SCNC: 19 MMOL/L — LOW (ref 22–31)
CREAT SERPL-MCNC: 0.65 MG/DL — SIGNIFICANT CHANGE UP (ref 0.5–1.3)
GLUCOSE BLDC GLUCOMTR-MCNC: 121 MG/DL — HIGH (ref 70–99)
GLUCOSE BLDC GLUCOMTR-MCNC: 122 MG/DL — HIGH (ref 70–99)
GLUCOSE BLDC GLUCOMTR-MCNC: 131 MG/DL — HIGH (ref 70–99)
GLUCOSE SERPL-MCNC: 138 MG/DL — HIGH (ref 70–99)
HCT VFR BLD CALC: 44.4 % — SIGNIFICANT CHANGE UP (ref 34.5–45)
HDLC SERPL-MCNC: 53 MG/DL — SIGNIFICANT CHANGE UP
HGB BLD-MCNC: 14.1 G/DL — SIGNIFICANT CHANGE UP (ref 11.5–15.5)
LIPID PNL WITH DIRECT LDL SERPL: 150 MG/DL — HIGH
MAGNESIUM SERPL-MCNC: 2 MG/DL — SIGNIFICANT CHANGE UP (ref 1.6–2.6)
MCHC RBC-ENTMCNC: 28.4 PG — SIGNIFICANT CHANGE UP (ref 27–34)
MCHC RBC-ENTMCNC: 31.8 GM/DL — LOW (ref 32–36)
MCV RBC AUTO: 89.5 FL — SIGNIFICANT CHANGE UP (ref 80–100)
NON HDL CHOLESTEROL: 170 MG/DL — HIGH
NRBC # BLD: 0 /100 WBCS — SIGNIFICANT CHANGE UP (ref 0–0)
PLATELET # BLD AUTO: 246 K/UL — SIGNIFICANT CHANGE UP (ref 150–400)
POTASSIUM SERPL-MCNC: 4.2 MMOL/L — SIGNIFICANT CHANGE UP (ref 3.5–5.3)
POTASSIUM SERPL-SCNC: 4.2 MMOL/L — SIGNIFICANT CHANGE UP (ref 3.5–5.3)
RBC # BLD: 4.96 M/UL — SIGNIFICANT CHANGE UP (ref 3.8–5.2)
RBC # FLD: 13.4 % — SIGNIFICANT CHANGE UP (ref 10.3–14.5)
SODIUM SERPL-SCNC: 138 MMOL/L — SIGNIFICANT CHANGE UP (ref 135–145)
TRIGL SERPL-MCNC: 99 MG/DL — SIGNIFICANT CHANGE UP
WBC # BLD: 7.64 K/UL — SIGNIFICANT CHANGE UP (ref 3.8–10.5)
WBC # FLD AUTO: 7.64 K/UL — SIGNIFICANT CHANGE UP (ref 3.8–10.5)

## 2021-12-19 RX ORDER — PANTOPRAZOLE SODIUM 20 MG/1
40 TABLET, DELAYED RELEASE ORAL
Refills: 0 | Status: DISCONTINUED | OUTPATIENT
Start: 2021-12-19 | End: 2021-12-21

## 2021-12-19 RX ORDER — ATORVASTATIN CALCIUM 80 MG/1
20 TABLET, FILM COATED ORAL AT BEDTIME
Refills: 0 | Status: DISCONTINUED | OUTPATIENT
Start: 2021-12-19 | End: 2021-12-21

## 2021-12-19 RX ADMIN — ENOXAPARIN SODIUM 40 MILLIGRAM(S): 100 INJECTION SUBCUTANEOUS at 09:18

## 2021-12-19 RX ADMIN — PANTOPRAZOLE SODIUM 40 MILLIGRAM(S): 20 TABLET, DELAYED RELEASE ORAL at 09:21

## 2021-12-19 RX ADMIN — ATORVASTATIN CALCIUM 20 MILLIGRAM(S): 80 TABLET, FILM COATED ORAL at 21:21

## 2021-12-19 RX ADMIN — Medication 81 MILLIGRAM(S): at 09:16

## 2021-12-19 RX ADMIN — LISINOPRIL 10 MILLIGRAM(S): 2.5 TABLET ORAL at 05:54

## 2021-12-19 RX ADMIN — MONTELUKAST 10 MILLIGRAM(S): 4 TABLET, CHEWABLE ORAL at 09:16

## 2021-12-20 LAB
ANION GAP SERPL CALC-SCNC: 13 MMOL/L — SIGNIFICANT CHANGE UP (ref 5–17)
BUN SERPL-MCNC: 13 MG/DL — SIGNIFICANT CHANGE UP (ref 7–23)
CALCIUM SERPL-MCNC: 9.8 MG/DL — SIGNIFICANT CHANGE UP (ref 8.4–10.5)
CHLORIDE SERPL-SCNC: 104 MMOL/L — SIGNIFICANT CHANGE UP (ref 96–108)
CO2 SERPL-SCNC: 22 MMOL/L — SIGNIFICANT CHANGE UP (ref 22–31)
CREAT SERPL-MCNC: 0.64 MG/DL — SIGNIFICANT CHANGE UP (ref 0.5–1.3)
GLUCOSE BLDC GLUCOMTR-MCNC: 144 MG/DL — HIGH (ref 70–99)
GLUCOSE BLDC GLUCOMTR-MCNC: 149 MG/DL — HIGH (ref 70–99)
GLUCOSE BLDC GLUCOMTR-MCNC: 155 MG/DL — HIGH (ref 70–99)
GLUCOSE BLDC GLUCOMTR-MCNC: 163 MG/DL — HIGH (ref 70–99)
GLUCOSE SERPL-MCNC: 126 MG/DL — HIGH (ref 70–99)
POTASSIUM SERPL-MCNC: 4.1 MMOL/L — SIGNIFICANT CHANGE UP (ref 3.5–5.3)
POTASSIUM SERPL-SCNC: 4.1 MMOL/L — SIGNIFICANT CHANGE UP (ref 3.5–5.3)
SODIUM SERPL-SCNC: 139 MMOL/L — SIGNIFICANT CHANGE UP (ref 135–145)

## 2021-12-20 RX ADMIN — ENOXAPARIN SODIUM 40 MILLIGRAM(S): 100 INJECTION SUBCUTANEOUS at 12:22

## 2021-12-20 RX ADMIN — LISINOPRIL 10 MILLIGRAM(S): 2.5 TABLET ORAL at 05:22

## 2021-12-20 RX ADMIN — MONTELUKAST 10 MILLIGRAM(S): 4 TABLET, CHEWABLE ORAL at 12:23

## 2021-12-20 RX ADMIN — ATORVASTATIN CALCIUM 20 MILLIGRAM(S): 80 TABLET, FILM COATED ORAL at 21:42

## 2021-12-20 RX ADMIN — Medication 1: at 09:00

## 2021-12-20 RX ADMIN — PANTOPRAZOLE SODIUM 40 MILLIGRAM(S): 20 TABLET, DELAYED RELEASE ORAL at 03:10

## 2021-12-20 RX ADMIN — Medication 81 MILLIGRAM(S): at 12:23

## 2021-12-21 ENCOUNTER — TRANSCRIPTION ENCOUNTER (OUTPATIENT)
Age: 61
End: 2021-12-21

## 2021-12-21 VITALS
SYSTOLIC BLOOD PRESSURE: 119 MMHG | HEART RATE: 61 BPM | TEMPERATURE: 98 F | RESPIRATION RATE: 18 BRPM | DIASTOLIC BLOOD PRESSURE: 71 MMHG | OXYGEN SATURATION: 99 %

## 2021-12-21 LAB
GLUCOSE BLDC GLUCOMTR-MCNC: 120 MG/DL — HIGH (ref 70–99)
GLUCOSE BLDC GLUCOMTR-MCNC: 143 MG/DL — HIGH (ref 70–99)

## 2021-12-21 PROCEDURE — 80048 BASIC METABOLIC PNL TOTAL CA: CPT

## 2021-12-21 PROCEDURE — 84484 ASSAY OF TROPONIN QUANT: CPT

## 2021-12-21 PROCEDURE — 86803 HEPATITIS C AB TEST: CPT

## 2021-12-21 PROCEDURE — 80061 LIPID PANEL: CPT

## 2021-12-21 PROCEDURE — 83036 HEMOGLOBIN GLYCOSYLATED A1C: CPT

## 2021-12-21 PROCEDURE — 71046 X-RAY EXAM CHEST 2 VIEWS: CPT

## 2021-12-21 PROCEDURE — 99285 EMERGENCY DEPT VISIT HI MDM: CPT | Mod: 25

## 2021-12-21 PROCEDURE — U0005: CPT

## 2021-12-21 PROCEDURE — 82962 GLUCOSE BLOOD TEST: CPT

## 2021-12-21 PROCEDURE — 75574 CT ANGIO HRT W/3D IMAGE: CPT | Mod: 26

## 2021-12-21 PROCEDURE — 36415 COLL VENOUS BLD VENIPUNCTURE: CPT

## 2021-12-21 PROCEDURE — 85027 COMPLETE CBC AUTOMATED: CPT

## 2021-12-21 PROCEDURE — 85025 COMPLETE CBC W/AUTO DIFF WBC: CPT

## 2021-12-21 PROCEDURE — 82550 ASSAY OF CK (CPK): CPT

## 2021-12-21 PROCEDURE — 83735 ASSAY OF MAGNESIUM: CPT

## 2021-12-21 PROCEDURE — 93005 ELECTROCARDIOGRAM TRACING: CPT

## 2021-12-21 PROCEDURE — 75574 CT ANGIO HRT W/3D IMAGE: CPT

## 2021-12-21 PROCEDURE — 82553 CREATINE MB FRACTION: CPT

## 2021-12-21 PROCEDURE — U0003: CPT

## 2021-12-21 PROCEDURE — 80053 COMPREHEN METABOLIC PANEL: CPT

## 2021-12-21 RX ORDER — ASPIRIN/CALCIUM CARB/MAGNESIUM 324 MG
1 TABLET ORAL
Qty: 30 | Refills: 0
Start: 2021-12-21 | End: 2022-01-19

## 2021-12-21 RX ORDER — CYCLOSPORINE 0.5 MG/ML
1 EMULSION OPHTHALMIC
Qty: 0 | Refills: 0 | DISCHARGE

## 2021-12-21 RX ORDER — MONTELUKAST 4 MG/1
1 TABLET, CHEWABLE ORAL
Qty: 0 | Refills: 0 | DISCHARGE

## 2021-12-21 RX ORDER — EMPAGLIFLOZIN 10 MG/1
1 TABLET, FILM COATED ORAL
Qty: 0 | Refills: 0 | DISCHARGE

## 2021-12-21 RX ORDER — ATORVASTATIN CALCIUM 80 MG/1
1 TABLET, FILM COATED ORAL
Qty: 30 | Refills: 0
Start: 2021-12-21 | End: 2022-01-19

## 2021-12-21 RX ADMIN — MONTELUKAST 10 MILLIGRAM(S): 4 TABLET, CHEWABLE ORAL at 12:16

## 2021-12-21 RX ADMIN — LISINOPRIL 10 MILLIGRAM(S): 2.5 TABLET ORAL at 05:17

## 2021-12-21 RX ADMIN — Medication 81 MILLIGRAM(S): at 12:17

## 2021-12-21 RX ADMIN — PANTOPRAZOLE SODIUM 40 MILLIGRAM(S): 20 TABLET, DELAYED RELEASE ORAL at 05:17

## 2021-12-21 NOTE — DISCHARGE NOTE PROVIDER - NSDCMRMEDTOKEN_GEN_ALL_CORE_FT
albuterol 90 mcg/inh inhalation aerosol: 2 puff(s) inhaled 4 times a day, As Needed  aspirin 81 mg oral delayed release tablet: 1 tab(s) orally once a day MDD:1  atorvastatin 20 mg oral tablet: 1 tab(s) orally once a day (at bedtime) MDD:1  Jardiance 10 mg oral tablet: 1 tab(s) orally once a day (in the morning)      lisinopril 10 mg oral tablet: Patient stated she takes one tablet only when her BP is high. Patient stated &quot;usually every other day&quot;   Restasis 0.05% ophthalmic emulsion: 1 drop(s) in each eye once a day      Singulair 10 mg oral tablet: 1 tab(s) orally once a

## 2021-12-21 NOTE — DISCHARGE NOTE NURSING/CASE MANAGEMENT/SOCIAL WORK - PATIENT PORTAL LINK FT
You can access the FollowMyHealth Patient Portal offered by [facility] by registering at the following website: [email or web address]. By joining [facility]s FollowMyHealth portal, you will also be able to view your health information using other applications (apps) compatible with our system.

## 2021-12-21 NOTE — PROGRESS NOTE ADULT - SUBJECTIVE AND OBJECTIVE BOX
Patient is a 61y old  Female who presents with a chief complaint of chest pain (18 Dec 2021 12:37)    Date of servie : 12-18-21 @ 17:55  INTERVAL HPI/OVERNIGHT EVENTS:  T(C): 36.6 (12-18-21 @ 12:30), Max: 36.6 (12-18-21 @ 12:30)  HR: 58 (12-18-21 @ 12:30) (57 - 73)  BP: 139/76 (12-18-21 @ 12:30) (130/82 - 158/84)  RR: 18 (12-18-21 @ 12:30) (16 - 18)  SpO2: 96% (12-18-21 @ 12:30) (96% - 100%)  Wt(kg): --  I&O's Summary    18 Dec 2021 07:01  -  18 Dec 2021 17:55  --------------------------------------------------------  IN: 360 mL / OUT: 0 mL / NET: 360 mL        LABS:                        15.0   7.56  )-----------( 266      ( 17 Dec 2021 17:33 )             47.2     12-17    142  |  104  |  10  ----------------------------<  175<H>  3.9   |  26  |  0.76    Ca    9.6      17 Dec 2021 17:33    TPro  7.9  /  Alb  4.6  /  TBili  0.4  /  DBili  x   /  AST  20  /  ALT  29  /  AlkPhos  100  12-17        CAPILLARY BLOOD GLUCOSE      POCT Blood Glucose.: 126 mg/dL (18 Dec 2021 17:34)  POCT Blood Glucose.: 115 mg/dL (18 Dec 2021 12:48)  POCT Blood Glucose.: 119 mg/dL (18 Dec 2021 08:48)  POCT Blood Glucose.: 121 mg/dL (17 Dec 2021 23:53)            MEDICATIONS  (STANDING):  aspirin enteric coated 81 milliGRAM(s) Oral daily  dextrose 40% Gel 15 Gram(s) Oral once  dextrose 5%. 1000 milliLiter(s) (50 mL/Hr) IV Continuous <Continuous>  dextrose 5%. 1000 milliLiter(s) (100 mL/Hr) IV Continuous <Continuous>  dextrose 50% Injectable 25 Gram(s) IV Push once  dextrose 50% Injectable 12.5 Gram(s) IV Push once  dextrose 50% Injectable 25 Gram(s) IV Push once  enoxaparin Injectable 40 milliGRAM(s) SubCutaneous daily  glucagon  Injectable 1 milliGRAM(s) IntraMuscular once  influenza   Vaccine 0.5 milliLiter(s) IntraMuscular once  insulin lispro (ADMELOG) corrective regimen sliding scale   SubCutaneous three times a day before meals  insulin lispro (ADMELOG) corrective regimen sliding scale   SubCutaneous at bedtime  lisinopril 10 milliGRAM(s) Oral daily  montelukast 10 milliGRAM(s) Oral daily    MEDICATIONS  (PRN):  acetaminophen     Tablet .. 650 milliGRAM(s) Oral every 6 hours PRN Temp greater or equal to 38C (100.4F), Mild Pain (1 - 3)  ALBUTerol    90 MICROgram(s) HFA Inhaler 2 Puff(s) Inhalation every 6 hours PRN Shortness of Breath and/or Wheezing          PHYSICAL EXAM:  GENERAL: NAD, well-groomed, well-developed  HEAD:  Atraumatic, Normocephalic  CHEST/LUNG: Clear to percussion bilaterally; No rales, rhonchi, wheezing, or rubs  HEART: Regular rate and rhythm; No murmurs, rubs, or gallops  ABDOMEN: Soft, Nontender, Nondistended; Bowel sounds present  EXTREMITIES:  2+ Peripheral Pulses, No clubbing, cyanosis, or edema  LYMPH: No lymphadenopathy noted  SKIN: No rashes or lesions    Care Discussed with Consultants/Other Providers [ ] YES  [ ] NO
Patient is a 61y old  Female who presents with a chief complaint of chest pain (19 Dec 2021 06:52)    Date of servie : 12-19-21 @ 17:24  INTERVAL HPI/OVERNIGHT EVENTS:  T(C): 36.6 (12-19-21 @ 13:52), Max: 36.6 (12-18-21 @ 21:42)  HR: 62 (12-19-21 @ 13:52) (59 - 65)  BP: 118/69 (12-19-21 @ 13:52) (118/69 - 128/73)  RR: 20 (12-19-21 @ 13:52) (18 - 20)  SpO2: 97% (12-19-21 @ 13:52) (95% - 98%)  Wt(kg): --  I&O's Summary    18 Dec 2021 07:01  -  19 Dec 2021 07:00  --------------------------------------------------------  IN: 360 mL / OUT: 0 mL / NET: 360 mL    19 Dec 2021 07:01  -  19 Dec 2021 17:24  --------------------------------------------------------  IN: 480 mL / OUT: 0 mL / NET: 480 mL        LABS:                        14.1   7.64  )-----------( 246      ( 19 Dec 2021 07:28 )             44.4     12-19    138  |  105  |  14  ----------------------------<  138<H>  4.2   |  19<L>  |  0.65    Ca    9.6      19 Dec 2021 07:40  Mg     2.0     12-19    TPro  7.9  /  Alb  4.6  /  TBili  0.4  /  DBili  x   /  AST  20  /  ALT  29  /  AlkPhos  100  12-17        CAPILLARY BLOOD GLUCOSE      POCT Blood Glucose.: 131 mg/dL (19 Dec 2021 13:05)  POCT Blood Glucose.: 122 mg/dL (19 Dec 2021 09:04)  POCT Blood Glucose.: 202 mg/dL (18 Dec 2021 22:17)  POCT Blood Glucose.: 126 mg/dL (18 Dec 2021 17:34)            MEDICATIONS  (STANDING):  aspirin enteric coated 81 milliGRAM(s) Oral daily  atorvastatin 20 milliGRAM(s) Oral at bedtime  dextrose 40% Gel 15 Gram(s) Oral once  dextrose 5%. 1000 milliLiter(s) (50 mL/Hr) IV Continuous <Continuous>  dextrose 5%. 1000 milliLiter(s) (100 mL/Hr) IV Continuous <Continuous>  dextrose 50% Injectable 25 Gram(s) IV Push once  dextrose 50% Injectable 12.5 Gram(s) IV Push once  dextrose 50% Injectable 25 Gram(s) IV Push once  enoxaparin Injectable 40 milliGRAM(s) SubCutaneous daily  glucagon  Injectable 1 milliGRAM(s) IntraMuscular once  influenza   Vaccine 0.5 milliLiter(s) IntraMuscular once  insulin lispro (ADMELOG) corrective regimen sliding scale   SubCutaneous three times a day before meals  insulin lispro (ADMELOG) corrective regimen sliding scale   SubCutaneous at bedtime  lisinopril 10 milliGRAM(s) Oral daily  montelukast 10 milliGRAM(s) Oral daily  pantoprazole    Tablet 40 milliGRAM(s) Oral before breakfast    MEDICATIONS  (PRN):  acetaminophen     Tablet .. 650 milliGRAM(s) Oral every 6 hours PRN Temp greater or equal to 38C (100.4F), Mild Pain (1 - 3)  ALBUTerol    90 MICROgram(s) HFA Inhaler 2 Puff(s) Inhalation every 6 hours PRN Shortness of Breath and/or Wheezing          PHYSICAL EXAM:  GENERAL: NAD, well-groomed, well-developed  HEAD:  Atraumatic, Normocephalic  CHEST/LUNG: Clear to percussion bilaterally; No rales, rhonchi, wheezing, or rubs  HEART: Regular rate and rhythm; No murmurs, rubs, or gallops  ABDOMEN: Soft, Nontender, Nondistended; Bowel sounds present  EXTREMITIES:  2+ Peripheral Pulses, No clubbing, cyanosis, or edema  LYMPH: No lymphadenopathy noted  SKIN: No rashes or lesions    Care Discussed with Consultants/Other Providers [ ] YES  [ ] NO
DATE OF SERVICE: 12-21-21 @ 13:41    Subjective: Patient seen and examined. No new events except as noted.     SUBJECTIVE/ROS:  No chest pain, dyspnea, palpitation, or dizziness.       MEDICATIONS:  MEDICATIONS  (STANDING):  aspirin enteric coated 81 milliGRAM(s) Oral daily  atorvastatin 20 milliGRAM(s) Oral at bedtime  dextrose 40% Gel 15 Gram(s) Oral once  dextrose 5%. 1000 milliLiter(s) (50 mL/Hr) IV Continuous <Continuous>  dextrose 5%. 1000 milliLiter(s) (100 mL/Hr) IV Continuous <Continuous>  dextrose 50% Injectable 25 Gram(s) IV Push once  dextrose 50% Injectable 12.5 Gram(s) IV Push once  dextrose 50% Injectable 25 Gram(s) IV Push once  enoxaparin Injectable 40 milliGRAM(s) SubCutaneous daily  glucagon  Injectable 1 milliGRAM(s) IntraMuscular once  influenza   Vaccine 0.5 milliLiter(s) IntraMuscular once  insulin lispro (ADMELOG) corrective regimen sliding scale   SubCutaneous three times a day before meals  insulin lispro (ADMELOG) corrective regimen sliding scale   SubCutaneous at bedtime  lisinopril 10 milliGRAM(s) Oral daily  montelukast 10 milliGRAM(s) Oral daily  pantoprazole    Tablet 40 milliGRAM(s) Oral before breakfast      PHYSICAL EXAM:  T(C): 36.6 (12-21-21 @ 12:50), Max: 36.6 (12-21-21 @ 12:50)  HR: 61 (12-21-21 @ 12:50) (61 - 71)  BP: 119/71 (12-21-21 @ 12:50) (99/66 - 123/82)  RR: 18 (12-21-21 @ 12:50) (18 - 18)  SpO2: 99% (12-21-21 @ 12:50) (94% - 99%)  Wt(kg): --  I&O's Summary    20 Dec 2021 07:01  -  21 Dec 2021 07:00  --------------------------------------------------------  IN: 360 mL / OUT: 0 mL / NET: 360 mL            JVP: Normal  Neck: supple  Lung: clear   CV: S1 S2 , Murmur:  Abd: soft  Ext: No edema  neuro: Awake / alert  Psych: flat affect  Skin: normal``    LABS/DATA:    CARDIAC MARKERS:            12-20    139  |  104  |  13  ----------------------------<  126<H>  4.1   |  22  |  0.64    Ca    9.8      20 Dec 2021 07:02      proBNP:   Lipid Profile:   HgA1c:   TSH:     TELE:  EKG:        
DATE OF SERVICE: 12-19-21 @ 06:52    Subjective: Patient seen and examined. No new events except as noted.     SUBJECTIVE/ROS:  still with chest discomfort , pain at night       MEDICATIONS:  MEDICATIONS  (STANDING):  aspirin enteric coated 81 milliGRAM(s) Oral daily  dextrose 40% Gel 15 Gram(s) Oral once  dextrose 5%. 1000 milliLiter(s) (50 mL/Hr) IV Continuous <Continuous>  dextrose 5%. 1000 milliLiter(s) (100 mL/Hr) IV Continuous <Continuous>  dextrose 50% Injectable 25 Gram(s) IV Push once  dextrose 50% Injectable 12.5 Gram(s) IV Push once  dextrose 50% Injectable 25 Gram(s) IV Push once  enoxaparin Injectable 40 milliGRAM(s) SubCutaneous daily  glucagon  Injectable 1 milliGRAM(s) IntraMuscular once  influenza   Vaccine 0.5 milliLiter(s) IntraMuscular once  insulin lispro (ADMELOG) corrective regimen sliding scale   SubCutaneous three times a day before meals  insulin lispro (ADMELOG) corrective regimen sliding scale   SubCutaneous at bedtime  lisinopril 10 milliGRAM(s) Oral daily  montelukast 10 milliGRAM(s) Oral daily      PHYSICAL EXAM:  T(C): 36.4 (12-19-21 @ 04:43), Max: 36.6 (12-18-21 @ 12:30)  HR: 59 (12-19-21 @ 04:43) (58 - 65)  BP: 122/67 (12-19-21 @ 04:43) (122/67 - 139/76)  RR: 18 (12-19-21 @ 04:43) (18 - 18)  SpO2: 98% (12-19-21 @ 04:43) (95% - 98%)  Wt(kg): --  I&O's Summary    18 Dec 2021 07:01  -  19 Dec 2021 06:52  --------------------------------------------------------  IN: 360 mL / OUT: 0 mL / NET: 360 mL            JVP: Normal  Neck: supple  Lung: clear   CV: S1 S2 , Murmur:  Abd: soft  Ext: No edema  neuro: Awake / alert  Psych: flat affect  Skin: normal``    LABS/DATA:    CARDIAC MARKERS:  CARDIAC MARKERS ( 18 Dec 2021 07:16 )  x     / x     / 131 U/L / x     / 1.8 ng/mL                                15.0   7.56  )-----------( 266      ( 17 Dec 2021 17:33 )             47.2     12-17    142  |  104  |  10  ----------------------------<  175<H>  3.9   |  26  |  0.76    Ca    9.6      17 Dec 2021 17:33    TPro  7.9  /  Alb  4.6  /  TBili  0.4  /  DBili  x   /  AST  20  /  ALT  29  /  AlkPhos  100  12-17    proBNP:   Lipid Profile:   HgA1c:   TSH:     TELE:  EKG:        
DATE OF SERVICE: 12-20-21 @ 10:44    Subjective: Patient seen and examined. No new events except as noted.     SUBJECTIVE/ROS:  had cp again       MEDICATIONS:  MEDICATIONS  (STANDING):  aspirin enteric coated 81 milliGRAM(s) Oral daily  atorvastatin 20 milliGRAM(s) Oral at bedtime  dextrose 40% Gel 15 Gram(s) Oral once  dextrose 5%. 1000 milliLiter(s) (50 mL/Hr) IV Continuous <Continuous>  dextrose 5%. 1000 milliLiter(s) (100 mL/Hr) IV Continuous <Continuous>  dextrose 50% Injectable 25 Gram(s) IV Push once  dextrose 50% Injectable 12.5 Gram(s) IV Push once  dextrose 50% Injectable 25 Gram(s) IV Push once  enoxaparin Injectable 40 milliGRAM(s) SubCutaneous daily  glucagon  Injectable 1 milliGRAM(s) IntraMuscular once  influenza   Vaccine 0.5 milliLiter(s) IntraMuscular once  insulin lispro (ADMELOG) corrective regimen sliding scale   SubCutaneous three times a day before meals  insulin lispro (ADMELOG) corrective regimen sliding scale   SubCutaneous at bedtime  lisinopril 10 milliGRAM(s) Oral daily  montelukast 10 milliGRAM(s) Oral daily  pantoprazole    Tablet 40 milliGRAM(s) Oral before breakfast      PHYSICAL EXAM:  T(C): 36.6 (12-20-21 @ 04:29), Max: 36.6 (12-19-21 @ 13:52)  HR: 62 (12-20-21 @ 04:29) (62 - 67)  BP: 139/80 (12-20-21 @ 04:29) (118/69 - 139/80)  RR: 20 (12-20-21 @ 04:29) (20 - 20)  SpO2: 96% (12-20-21 @ 04:29) (95% - 97%)  Wt(kg): --  I&O's Summary    19 Dec 2021 07:01  -  20 Dec 2021 07:00  --------------------------------------------------------  IN: 1130 mL / OUT: 0 mL / NET: 1130 mL    20 Dec 2021 07:01  -  20 Dec 2021 10:44  --------------------------------------------------------  IN: 180 mL / OUT: 0 mL / NET: 180 mL            JVP: Normal  Neck: supple  Lung: clear   CV: S1 S2 , Murmur:  Abd: soft  Ext: No edema  neuro: Awake / alert  Psych: flat affect  Skin: normal``    LABS/DATA:    CARDIAC MARKERS:  CARDIAC MARKERS ( 18 Dec 2021 07:16 )  x     / x     / 131 U/L / x     / 1.8 ng/mL                                14.1   7.64  )-----------( 246      ( 19 Dec 2021 07:28 )             44.4     12-20    139  |  104  |  13  ----------------------------<  126<H>  4.1   |  22  |  0.64    Ca    9.8      20 Dec 2021 07:02  Mg     2.0     12-19      proBNP:   Lipid Profile:   HgA1c:   TSH:     TELE:  EKG:        
Patient is a 61y old  Female who presents with a chief complaint of chest pain (20 Dec 2021 10:43)    Date of servie : 12-20-21 @ 14:25  INTERVAL HPI/OVERNIGHT EVENTS:  T(C): 36.5 (12-20-21 @ 12:22), Max: 36.6 (12-20-21 @ 04:29)  HR: 67 (12-20-21 @ 12:22) (62 - 67)  BP: 117/80 (12-20-21 @ 12:22) (117/80 - 139/80)  RR: 18 (12-20-21 @ 12:22) (18 - 20)  SpO2: 96% (12-20-21 @ 04:29) (95% - 96%)  Wt(kg): --  I&O's Summary    19 Dec 2021 07:01  -  20 Dec 2021 07:00  --------------------------------------------------------  IN: 1130 mL / OUT: 0 mL / NET: 1130 mL    20 Dec 2021 07:01  -  20 Dec 2021 14:25  --------------------------------------------------------  IN: 360 mL / OUT: 0 mL / NET: 360 mL        LABS:                        14.1   7.64  )-----------( 246      ( 19 Dec 2021 07:28 )             44.4     12-20    139  |  104  |  13  ----------------------------<  126<H>  4.1   |  22  |  0.64    Ca    9.8      20 Dec 2021 07:02  Mg     2.0     12-19          CAPILLARY BLOOD GLUCOSE      POCT Blood Glucose.: 144 mg/dL (20 Dec 2021 13:00)  POCT Blood Glucose.: 155 mg/dL (20 Dec 2021 08:38)  POCT Blood Glucose.: 121 mg/dL (19 Dec 2021 21:34)            MEDICATIONS  (STANDING):  aspirin enteric coated 81 milliGRAM(s) Oral daily  atorvastatin 20 milliGRAM(s) Oral at bedtime  dextrose 40% Gel 15 Gram(s) Oral once  dextrose 5%. 1000 milliLiter(s) (50 mL/Hr) IV Continuous <Continuous>  dextrose 5%. 1000 milliLiter(s) (100 mL/Hr) IV Continuous <Continuous>  dextrose 50% Injectable 25 Gram(s) IV Push once  dextrose 50% Injectable 12.5 Gram(s) IV Push once  dextrose 50% Injectable 25 Gram(s) IV Push once  enoxaparin Injectable 40 milliGRAM(s) SubCutaneous daily  glucagon  Injectable 1 milliGRAM(s) IntraMuscular once  influenza   Vaccine 0.5 milliLiter(s) IntraMuscular once  insulin lispro (ADMELOG) corrective regimen sliding scale   SubCutaneous three times a day before meals  insulin lispro (ADMELOG) corrective regimen sliding scale   SubCutaneous at bedtime  lisinopril 10 milliGRAM(s) Oral daily  montelukast 10 milliGRAM(s) Oral daily  pantoprazole    Tablet 40 milliGRAM(s) Oral before breakfast    MEDICATIONS  (PRN):  acetaminophen     Tablet .. 650 milliGRAM(s) Oral every 6 hours PRN Temp greater or equal to 38C (100.4F), Mild Pain (1 - 3)  ALBUTerol    90 MICROgram(s) HFA Inhaler 2 Puff(s) Inhalation every 6 hours PRN Shortness of Breath and/or Wheezing          PHYSICAL EXAM:  GENERAL: NAD, well-groomed, well-developed  HEAD:  Atraumatic, Normocephalic  CHEST/LUNG: Clear to percussion bilaterally; No rales, rhonchi, wheezing, or rubs  HEART: Regular rate and rhythm; No murmurs, rubs, or gallops  ABDOMEN: Soft, Nontender, Nondistended; Bowel sounds present  EXTREMITIES:  2+ Peripheral Pulses, No clubbing, cyanosis, or edema  LYMPH: No lymphadenopathy noted  SKIN: No rashes or lesions    Care Discussed with Consultants/Other Providers [ ] YES  [ ] NO

## 2021-12-21 NOTE — DISCHARGE NOTE PROVIDER - HOSPITAL COURSE
To be done. Patient is a 61y old  Female who presents with a chief complaint of chest pain.  Pt was admitted for angina pectoris.  Coronary CTA was unremarkable.  HTN, continue with current medication regimen.  Pt is hemodynamically stable for discharge to home today. She will follow up with her PMD and Dr. Maher, Cardiology in 1 week.

## 2021-12-21 NOTE — DISCHARGE NOTE PROVIDER - NSDCCPCAREPLAN_GEN_ALL_CORE_FT
PRINCIPAL DISCHARGE DIAGNOSIS  Diagnosis: Chest pain  Assessment and Plan of Treatment:   HOME CARE INSTRUCTIONS  For the next few days, avoid physical activities that bring on chest pain. Continue physical activities as directed.  Do not smoke.  Avoid drinking alcohol.   Only take over-the-counter or prescription medicine for pain, discomfort, or fever as directed by your caregiver.  Follow your caregiver's suggestions for further testing if your chest pain does not go away.  Keep any follow-up appointments you made. If you do not go to an appointment, you could develop lasting (chronic) problems with pain. If there is any problem keeping an appointment, you must call to reschedule.   SEEK MEDICAL CARE IF:  You think you are having problems from the medicine you are taking. Read your medicine instructions carefully.  Your chest pain does not go away, even after treatment.  You develop a rash with blisters on your chest.  SEEK IMMEDIATE MEDICAL CARE IF:  You have increased chest pain or pain that spreads to your arm, neck, jaw, back, or abdomen.   You develop shortness of breath, an increasing cough, or you are coughing up blood.  You have severe back or abdominal pain, feel nauseous, or vomit.  You develop severe weakness, fainting, or chills.  You have a fever.  THIS IS AN EMERGENCY. Do not wait to see if the pain will go away. Get medical help at once. Call your local emergency services (_____________________). Do not drive yourself to the hospital.

## 2021-12-21 NOTE — DISCHARGE NOTE PROVIDER - PROVIDER TOKENS
PROVIDER:[TOKEN:[[id number]],FOLLOWUP:[1 week],ESTABLISHEDPATIENT:[T]],FREE:[LAST:[Dr. Plaza],FIRST:[PMD],PHONE:[(   )    -],FAX:[(   )    -],FOLLOWUP:[1 week],ESTABLISHEDPATIENT:[T]]

## 2021-12-21 NOTE — PROGRESS NOTE ADULT - ASSESSMENT
Problem/Plan - 1:  ·  Problem: Chest pain.   ·  Plan: Differential diagnosis includes but is not limited to unstable angina, gastritis, GERD, esophagitis, PUD.  cards fu  check CT coronaries     Problem/Plan - 2:  ·  Problem: Hypertension.   ·  Plan: Continue home dose of lisinopril.    Problem/Plan - 3:  ·  Problem: Asthma.   ·  Plan: Patient reports asthma has been well controlled and she uses her albuterol rescue inhaler less than once a week and never at night. She does not have audible wheezing at this time and is breathing comfortable on room air.   Continue singulair. Continue albuterol PRN.    Problem/Plan - 4:  ·  Problem: History of CVA (cerebrovascular accident).   ·  Plan: The patient reports history of CVA  without residual deficits however denies taking daily aspirin or statin.  cw aspirin  Problem/Plan - 5:  ·  Problem: Prophylactic measure.   ·  Plan: VTE ppx: low risk, venodyne boots  Activity: OOB with assistance  Diet: DASH, CC.    case dw cardiology   
  Problem/Plan - 1:  ·  Problem: Chest pain.   ·  Plan: Differential diagnosis includes but is not limited to unstable angina, gastritis, GERD, esophagitis, PUD.  Patient received tylenol and aspirin 324mg in the ED.  Monitor on telemetry  Trend troponin.  Check exercise stress test.  Check A1C and lipid panel.  Notify Dr. Maher in AM of patient's admission.    Problem/Plan - 2:  ·  Problem: Hypertension.   ·  Plan: Continue home dose of lisinopril.  Problem/Plan - 3:  ·  Problem: Asthma.   ·  Plan: Patient reports asthma has been well controlled and she uses her albuterol rescue inhaler less than once a week and never at night. She does not have audible wheezing at this time and is breathing comfortable on room air.   Continue singulair. Continue albuterol PRN.    Problem/Plan - 4:  ·  Problem: History of CVA (cerebrovascular accident).   ·  Plan: The patient reports history of CVA  without residual deficits however denies taking daily aspirin or statin.  Start baby aspirin. Will check lipid panel as noted above.    Problem/Plan - 5:  ·  Problem: Prophylactic measure.   ·  Plan: VTE ppx: low risk, venodyne boots  Activity: OOB with assistance  Diet: DASH, CC.    
Problem/Plan - 1:  ·  Problem: Chest pain.   ·  Plan: Differential diagnosis includes but is not limited to unstable angina, gastritis, GERD, esophagitis, PUD.  cards fu  check CT coronaries     Problem/Plan - 2:  ·  Problem: Hypertension.   ·  Plan: Continue home dose of lisinopril.    Problem/Plan - 3:  ·  Problem: Asthma.   ·  Plan: Patient reports asthma has been well controlled and she uses her albuterol rescue inhaler less than once a week and never at night. She does not have audible wheezing at this time and is breathing comfortable on room air.   Continue singulair. Continue albuterol PRN.    Problem/Plan - 4:  ·  Problem: History of CVA (cerebrovascular accident).   ·  Plan: The patient reports history of CVA  without residual deficits however denies taking daily aspirin or statin.  cw aspirin    Problem/Plan - 5:  ·  Problem: Prophylactic measure.   ·  Plan: VTE ppx: low risk, venodyne boots  Activity: OOB with assistance  Diet: DASH, CC.    case dw cardiology       
angina pectoris  ruled out for acute MI   asa  statin   coronary cta    HTN  cont current meds    Dm II  Monitor finger stick. Insulin coverage. Diabetic education and Diabetic diet. Consider nutrition consultation.  
angina pectoris  ruled out for acute MI   asa  statin   obtain coronary cta  ? GERD will start PPI     HTN  cont current meds    Dm II  Monitor finger stick. Insulin coverage. Diabetic education and Diabetic diet. Consider nutrition consultation.  
angina pectoris  coronary cta reviewed  unremarkable     HTN  cont current meds    Dm II  Monitor finger stick. Insulin coverage. Diabetic education and Diabetic diet. Consider nutrition consultation.

## 2021-12-21 NOTE — DISCHARGE NOTE PROVIDER - CARE PROVIDER_API CALL
Stephon Maher  CARDIOVASCULAR DISEASE  [address], [address]  [place] [address]  Phone: [phone]  Fax: [phone]  Established Patient  Follow Up Time: 1 week    Dr. Plaza, [facility]  Phone: (   )    -  Fax: (   )    -  Established Patient  Follow Up Time: 1 week

## 2021-12-21 NOTE — DISCHARGE NOTE PROVIDER - NSDCFUADDAPPT_GEN_ALL_CORE_FT
APPTS ARE READY TO BE MADE: [x ] YES    Best Family or Patient Contact (if needed):    Additional Information about above appointments (if needed):    1: Call to make an appointment with Dr. Maher in 1 week.   2: Follow up with PMD, Dr. Reese in 1 week @ [phone].  3:     Other comments or requests:    APPTS ARE READY TO BE MADE: [x ] YES    Best Family or Patient Contact (if needed):    Additional Information about above appointments (if needed):    1: Call to make an appointment with Dr. Maher in 1 week.   2: Follow up with PMD, Dr. Reese in 1 week @ [phone].  3:     Other comments or requests:   Patient was provided with information for Dr. Malhotra and Dr. Maher and advised to call to schedule follow up within specified time frame. At this time, patient declined scheduling assistance.

## 2021-12-21 NOTE — DISCHARGE NOTE NURSING/CASE MANAGEMENT/SOCIAL WORK - NSDCPEPTSTROKESIGNS_GEN_ALL_CORE
Sudden numbness or weakness of the face, arm, or leg, especially on one side of the body. Confusion, trouble speaking or understanding. Trouble seeing in one or both eyes. Trouble walking, dizziness, loss of balance or coordination. Severe headache. Pt reports known UTI that was dx in  and pt was d/c with outpt abx. Pt reports he was called to return to ED for IV abx.  Pt denies any f/c/c at this time. Denies pain at this time. Labs and meds as ordered.

## 2021-12-21 NOTE — DISCHARGE NOTE NURSING/CASE MANAGEMENT/SOCIAL WORK - NSDCPEFALRISK_GEN_ALL_CORE
For information on Fall & Injury Prevention, visit: [email or web address] OR  [email or web address] OR  https://www.cdc.gov/steadi/patient.html

## 2021-12-29 NOTE — ED ADULT NURSE NOTE - NS ED NURSE DISCH DISPOSITION
Physical Therapy  Facility/Department: [facility] C5 - MED SURG/ORTHO  Daily Treatment Note  NAME: Tran Pearce  : 1944  MRN: [id number]    Date of Service: 2021    Discharge Recommendations:  Subacute/Skilled Nursing Facility   PT Equipment Recommendations  Other: defer    Assessment   Body structures, Functions, Activity limitations: Decreased functional mobility ; Decreased ADL status; Decreased strength;Decreased balance;Decreased endurance;Decreased cognition;Decreased posture;Decreased high-level IADLs;Decreased coordination  Assessment: Pt seen for bed mobility and ther ex today. Pt required mod/max A  of supine to sit and was able to sit EOB with ocassional A for balance anlong with BUE support. Pt demos diffculty expressing needs and needs verbal, visual and tactile cues for ex. Pt is recommended for con't skilled PTand SNFupon D/C. Treatment Diagnosis: Decreased functional mobility  Specific instructions for Next Treatment: Progress exercises/mobility  Prognosis: Fair  Decision Making: High Complexity  PT Education: Goals;PT Role;Plan of Care;Orientation;General Safety; Energy Conservation;Transfer Training; Adaptive Device Training;Gait Training;Disease Specific Education  Patient Education: Pt educated on importance of OOB mobility for current deficits, pt does not demo learning needs reinforcement  Barriers to Learning: cog, aphasia  REQUIRES PT FOLLOW UP: Yes  Activity Tolerance  Activity Tolerance:   Vitals:    21 1329   BP: (!) 144/73   Pulse: 70   Resp:    Temp:    SpO2: 92%          Patient Diagnosis(es): The primary encounter diagnosis was Aphasia. Diagnoses of General weakness, Syncope and collapse, Elevated brain natriuretic peptide (BNP) level, and COVID-19 were also pertinent to this visit. has a past medical history of Hyperlipemia, Hypertension, Prostate enlargement, and Unspecified cerebral artery occlusion with cerebral infarction.    has a past surgical history that includes Carotid endarterectomy; Foot surgery; Skin cancer excision; Eye surgery; Facial reconstruction surgery; Tonsillectomy; Colonoscopy; Colonoscopy (2014); and Mohs surgery. Restrictions  Restrictions/Precautions  Restrictions/Precautions: Up as Tolerated,Isolation,Contact Precautions  Position Activity Restriction  Other position/activity restrictions: Covid +, Droplet plus     Subjective   General  Chart Reviewed: Yes  Additional Pertinent Hx: per H&P\"77 y.o. male with a PMH of HLD, HTN, PAF and CVA who presented to [facility] d/t confusion and fall this morning. Family reports generalized weakness and some speech difficulty. No stoke alert was called. Pt had covid exposure earlier this week. Pt is only oriented to self and place. Denies HA, chest pain ,sob, abdominal pain, nausea, vomiting or diarrhea. On room air. \"  Response To Previous Treatment: Patient with no complaints from previous session. Family / Caregiver Present: No  Referring Practitioner: Travis Adams MD  Subjective  Subjective: agreeable to PT treat, expressive aphasia noted with responses to questions  Pain Screening  Patient Currently in Pain: Yes (R hip reported with ex)  Vital Signs  Patient Currently in Pain: Yes (R hip reported with ex)             Objective   Bed mobility  Rolling to Right: Moderate assistance  Supine to Sit: Maximum assistance  Sit to Supine:  Moderate assistance  Scooting: Maximal assistance (to scoot HOB)  Transfers  Sit to Stand: Unable to assess (unsafe to attempt alone)  Ambulation  Ambulation?: No     Balance  Posture: Poor  Sitting - Static: Fair  Sitting - Dynamic: Fair  Comments: BUE support and occassional  A needed for sitting balance at EOB X 10 minutes  Exercises  Heelslides: X 10 BLE supine  Hip Abduction: X 5 -10 BLE supine, reports of discomfort or pain R hip  Ankle Pumps: x10 bilat  Core: BUE reaching inside and outside RYAN while sitting EOB X 5 rep each  Comments: constant cues for technique and sequencing, pt easily gets off task        AM-PAC Score     AM-PAC Inpatient Mobility without Stair Climbing Raw Score : 9 (12/29/21 1522)  AM-PAC Inpatient without Stair Climbing T-Scale Score : 32.44 (12/29/21 1522)  Mobility Inpatient CMS 0-100% Score: 76.07 (12/29/21 1522)  Mobility Inpatient without Stair CMS G-Code Modifier : CL (12/29/21 1522)       Goals  Short term goals  Time Frame for Short term goals: 1 week, 1/02/2022  Short term goal 1: Pt will tolerate bed mobility w/ SBA; 12/29 mod/max A of 1  Short term goal 2: Pt will perform sit to stand w/ MinAx1 and LRAD; 12/29 NT  Short term goal 3: Pt will ambulate 25 ft w/ LRAD and ModAx1; 12/29 NT  Short term goal 4: Pt will partake in 10-15 reps of BLE exercises to improve function towards goals by 12/29.; 12/29 progressing  Patient Goals   Patient goals : \"to go home\"    Plan    Plan  Times per week: 2-3  Times per day: Daily  Specific instructions for Next Treatment: Progress exercises/mobility  Current Treatment Recommendations: Strengthening,ROM,Balance Training,Functional Mobility Training,Cognitive/Perceptual Training,IADL Training,ADL/Self-care Training,Transfer Training,Endurance Training,Gait Training,Neuromuscular Re-education,Pain [employer] Exercise Program,Safety Education & Training,Patient/Caregiver Education & Training  Safety Devices  Type of devices:  All fall risk precautions in place,Call light within reach,Bed alarm in place,Left in bed,Nurse notified,Telesitter in use     Therapy Time   Individual Concurrent Group Co-treatment   Time In 1345         Time Out 1430         Minutes [address] [place] [address] [id number] Discharged

## 2022-03-15 NOTE — ED ADULT NURSE NOTE - NS ED NURSE RECORD ANOTHER HT AND WT
S/w pt. Conveyed Dr. Willis message and recommendations. Patient verbalized understanding and will call back with any additional questions or concerns.     Yes

## 2022-04-07 ENCOUNTER — RESULT REVIEW (OUTPATIENT)
Age: 62
End: 2022-04-07

## 2022-04-09 ENCOUNTER — APPOINTMENT (OUTPATIENT)
Dept: OBGYN | Facility: CLINIC | Age: 62
End: 2022-04-09

## 2022-05-11 ENCOUNTER — APPOINTMENT (OUTPATIENT)
Dept: NEUROLOGY | Facility: CLINIC | Age: 62
End: 2022-05-11

## 2022-12-21 NOTE — ED ADULT TRIAGE NOTE - WEIGHT METHOD
[facility] - Telemetry ([facility])  Cardiology  Progress Note    Patient Name: Meaghan Potter  MRN: [id number]  Admission Date: 12/19/2022  Hospital Length of Stay: 0 days  Code Status: Full Code   Attending Physician: Afshan Márquez MD   Primary Care Physician: Primary Doctor No  Expected Discharge Date:   Principal Problem:<principal problem not specified>    Subjective:   HPI:  Ms. Potter is a 66y/o female with PMHx ESRD on dialysis, HTN and GERD who presented to [facility] ED transfer from [facility] for cardiac workup following elevated troponin and BNP in the emergency department as well as orthopedic care after patient sustained left superior labral tear of her acetabulum noted on MRI. Cardiology was consulted for new onset afib on diltiazem gtt. Pt seen and examined today resting in bed. Patient reported being in her usual state of health prior to onset of symptoms and reported acute onset of left hip pain while performing leg exercises in bed earlier today. While at outside facility, patient became short of breath while lying flat while obtaining MRI of hip and was found to be in atrial fibrillation with RVR requiring diltiazem drip. She denied endorsing any chest pain, lightheadedness, dizziness, visual disturbances, fever, chills, sweats, nausea, vomiting, abdominal pain, changes in bowel/bladder habits, or onset neurological deficits.  All other review of systems negative except as noted above.  Patient reports being back at her baseline and continues to complain only of left hip pain. Pt missed HD yesterday, planned for today. Labs reviewed K 6.1, Crt 10.6 troponin .076->.095 BNP 4702. Echo pending, repeat EKG ordered       Hospital Course:   12/21/22-Patient seen and examined today, resting in bed. Feels ok. Still complains of hip pain. No SOB/chest pain. Still in afib but HR controlled. Cardizem switched to po. Will re-evaluate pericardial effusion with repeat echo IP vs OP post additional HD.          Review of  Systems   Constitutional: Positive for malaise/fatigue.   Eyes: Negative.    Cardiovascular: Negative.    Respiratory: Negative.     Endocrine: Negative.    Hematologic/Lymphatic: Negative.    Skin: Negative.    Musculoskeletal:  Positive for joint pain (hip pain).   Gastrointestinal: Negative.    Genitourinary: Negative.    Neurological: Negative.    Psychiatric/Behavioral: Negative.     Allergic/Immunologic: Negative.    Objective:     Vital Signs (Most Recent):  Temp: 99.6 °F (37.6 °C) (12/21/22 1204)  Pulse: 88 (12/21/22 1204)  Resp: 17 (12/21/22 1204)  BP: 115/60 (12/21/22 1204)  SpO2: 98 % (12/21/22 1204) Vital Signs (24h Range):  Temp:  [98.1 °F (36.7 °C)-99.6 °F (37.6 °C)] 99.6 °F (37.6 °C)  Pulse:  [64-94] 88  Resp:  [16-20] 17  SpO2:  [97 %-99 %] 98 %  BP: (106-136)/(56-74) 115/60     Weight: 70.9 kg (156 lb 4.9 oz)  Body mass index is 26.83 kg/m².     SpO2: 98 %         Intake/Output Summary (Last 24 hours) at 12/21/2022 1359  Last data filed at 12/21/2022 0519  Gross per 24 hour   Intake 359.79 ml   Output 3601 ml   Net -3241.21 ml       Lines/Drains/Airways       Drain  Duration                  Hemodialysis AV Fistula Left upper arm -- days              Peripheral Intravenous Line  Duration                  Peripheral IV - Single Lumen 12/19/22 1155 22 G Posterior;Right Hand 2 days                    Physical Exam  Vitals and nursing note reviewed.   Constitutional:       General: She is not in acute distress.     Appearance: Normal appearance. She is well-developed. She is not diaphoretic.      Comments: On supplemental O2   HENT:      Head: Normocephalic and atraumatic.   Eyes:      General:         Right eye: No discharge.         Left eye: No discharge.      Pupils: Pupils are equal, round, and reactive to light.   Neck:      Thyroid: No thyromegaly.      Vascular: No JVD.      Trachea: No tracheal deviation.   Cardiovascular:      Rate and Rhythm: Normal rate. Rhythm irregularly irregular.       Heart sounds: Normal heart sounds, S1 normal and S2 normal. No murmur heard.  Pulmonary:      Effort: Pulmonary effort is normal. No respiratory distress.      Breath sounds: Normal breath sounds. No wheezing or rales.   Abdominal:      General: There is no distension.      Tenderness: There is no rebound.   Musculoskeletal:      Cervical back: Neck supple.      Right lower leg: No edema.      Left lower leg: No edema.   Skin:     General: Skin is warm and dry.      Findings: No erythema.   Neurological:      General: No focal deficit present.      Mental Status: She is alert and oriented to person, place, and time.   Psychiatric:         Mood and Affect: Mood normal.         Behavior: Behavior normal.         Thought Content: Thought content normal.       Significant Labs: CMP   Recent Labs   Lab 12/20/22 0457 12/20/22 1658 12/21/22  0618    137 137   K 6.1* 4.6 5.3*    95 94*   CO2 20* 24 27   GLU 87 119* 97   BUN 66* 31* 43*   CREATININE 10.6* 6.3* 7.8*   CALCIUM 9.8 9.4 8.8   PROT 7.4  --  6.7   ALBUMIN 3.4*  --  3.1*   BILITOT 1.5*  --  1.5*   ALKPHOS 102  --  91   AST 23  --  35   ALT 16  --  16   ANIONGAP 15 18* 16   , CBC   Recent Labs   Lab 12/20/22 0457 12/21/22  0618   WBC 6.35 6.15  6.15   HGB 11.6* 11.3*  11.3*   HCT 35.7* 35.6*  35.6*   PLT 95* 100*  100*   , INR   Recent Labs   Lab 12/20/22  1359   INR 1.1   , Troponin   Recent Labs   Lab 12/19/22 2009 12/20/22  1658   TROPONINI 0.095* 0.091*   , and All pertinent lab results from the last 24 hours have been reviewed.    Significant Imaging: Echocardiogram: Transthoracic echo (TTE) complete ([facility] Only):   Results for orders placed or performed during the hospital encounter of 12/19/22   Echo   Result Value Ref Range    BSA 1.83 m2    TDI SEPTAL 0.06 m/s    LV LATERAL E/E' RATIO 15.33 m/s    LV SEPTAL E/E' RATIO 15.33 m/s    LA WIDTH 3.80 cm    IVC diameter 2.82 cm    Left Ventricular Outflow Tract Mean Velocity 0.63 cm/s    Left  Ventricular Outflow Tract Mean Gradient 2.01 mmHg    TDI LATERAL 0.06 m/s    LVIDd 5.31 3.5 - 6.0 cm    IVS 1.67 (A) 0.6 - 1.1 cm    Posterior Wall 1.49 (A) 0.6 - 1.1 cm    Ao root annulus 3.06 cm    LVIDs 3.78 2.1 - 4.0 cm    FS 29 28 - 44 %    LA volume 89.40 cm3    Sinus 3.26 cm    STJ 3.37 cm    Ascending aorta 3.42 cm    LV mass 381.59 g    LA size 4.15 cm    TAPSE 2.27 cm    Left Ventricle Relative Wall Thickness 0.56 cm    AV mean gradient 9 mmHg    AV valve area 2.09 cm2    AV Velocity Ratio 0.51     AV index (prosthetic) 0.61     MV valve area p 1/2 method 5.12 cm2    E/A ratio 4.38     Mean e' 0.06 m/s    E wave deceleration time 148.05 msec    IVRT 87.54 msec    LVOT diameter 2.09 cm    LVOT area 3.4 cm2    LVOT peak krunal 1.08 m/s    LVOT peak VTI 19.80 cm    Ao peak krunal 2.12 m/s    Ao VTI 32.5 cm    RVOT peak krunal 0.54 m/s    RVOT peak VTI 13.9 cm    Mr max krunal 5.03 m/s    LVOT stroke volume 67.89 cm3    AV peak gradient 18 mmHg    PV mean gradient 0.70 mmHg    E/E' ratio 15.33 m/s    MV Peak E Krunal 0.92 m/s    TR Max Krunal 3.79 m/s    MV stenosis pressure 1/2 time 42.93 ms    MV Peak A Krunal 0.21 m/s    LV Systolic Volume 61.16 mL    LV Systolic Volume Index 34.0 mL/m2    LV Diastolic Volume 136.00 mL    LV Diastolic Volume Index 75.56 mL/m2    LA Volume Index 49.7 mL/m2    LV Mass Index 212 g/m2    RA Major Axis 5.77 cm    Left Atrium Minor Axis 6.48 cm    Left Atrium Major Axis 6.87 cm    Triscuspid Valve Regurgitation Peak Gradient 57 mmHg    RA Width 3.67 cm    Right Atrial Pressure (from IVC) 3 mmHg    TV rest pulmonary artery pressure 60 mmHg    EF 55 %    Narrative    · The left ventricle is normal in size with concentric hypertrophy and   normal systolic function.  · Atrial fibrillation observed.  · Biatrial atrial enlargement.  · Grade III left ventricular diastolic dysfunction.  · The estimated PA systolic pressure is 60 mmHg.  · Moderate right ventricular enlargement with normal right ventricular    systolic function.  · There is pulmonary hypertension.  · Normal central venous pressure (3 mmHg).  · The estimated ejection fraction is 55%.  · Moderate to severe tricuspid regurgitation.  · Mild-to-moderate mitral regurgitation.  · Severe right atrial enlargement.  · Large posterior pericardial effusion. No evidence of tamponade.      , EKG: Reviewed, and X-Ray: CXR: X-Ray Chest 1 View (CXR): No results found for this visit on 12/19/22. and X-Ray Chest PA and Lateral (CXR): No results found for this visit on 12/19/22.    Assessment and Plan:   Patient who presents with new onset afib and hip pain. HR controlled. Switching to po cardizem. Needs OAC once able to tolerate. Will repeat echo to reassess pericardial effusion after additional HD session.    Pericardial effusion  -No evidence of tamponade  -Will reassess with echo after additional session of HD    Shortness of breath  Likely secondary to afib and fluid overload  HD today    Left hip pain  Ortho consulted    Primary hypertension  Treatment per primary team    ESRD (end stage renal disease) on dialysis  Management as per primary team    Atrial fibrillation  EKG reviewed afib  Repeat EKG ordered  Will need AC    12/21/22  -Still in afib but HR controlled  -Cardizem switched to po  -Continue heparin gtt for now, will need OAC upon d/c        VTE Risk Mitigation (From admission, onward)         Ordered     heparin 25,000 units in dextrose 5% (100 units/ml) IV bolus from bag - ADDITIONAL PRN BOLUS - 60 units/kg  As needed (PRN)        Question:  Heparin Infusion Adjustment (DO NOT MODIFY ANSWER)  Answer:  [email or web address]\epic\Images\Pharmacy\HeparinInfusions\heparin LOW INTENSITY nomogram for [facility] [id number]    12/20/22 1315     heparin 25,000 units in dextrose 5% (100 units/ml) IV bolus from bag - ADDITIONAL PRN BOLUS - 30 units/kg  As needed (PRN)        Question:  Heparin Infusion Adjustment (DO NOT MODIFY ANSWER)  Answer:   [email or web address]\epic\Images\Pharmacy\HeparinInfusions\heparin LOW INTENSITY nomogram for [facility] [id number]    12/20/22 1315     heparin 25,000 units in dextrose 5% 250 mL (100 units/mL) infusion LOW INTENSITY nomogram - [facility]  Continuous        Question Answer Comment   Heparin Infusion Adjustment (DO NOT MODIFY ANSWER) \\[email or web address]\epic\Images\Pharmacy\HeparinInfusions\heparin LOW INTENSITY nomogram for [facility] [id number]    Begin at (in units/kg/hr) 12        12/20/22 1315     Place sequential compression device  Until discontinued         12/19/22 2119     IP VTE LOW RISK PATIENT  Once         12/19/22 2119                Simona Ott PA-C  Cardiology  [facility] - Telemetry ([facility])   stated

## 2023-01-14 ENCOUNTER — OFFICE (OUTPATIENT)
Dept: URBAN - METROPOLITAN AREA CLINIC 27 | Facility: CLINIC | Age: 63
Setting detail: OPHTHALMOLOGY
End: 2023-01-14
Payer: COMMERCIAL

## 2023-01-14 DIAGNOSIS — H16.221: ICD-10-CM

## 2023-01-14 DIAGNOSIS — H25.13: ICD-10-CM

## 2023-01-14 DIAGNOSIS — E11.3311: ICD-10-CM

## 2023-01-14 DIAGNOSIS — H16.222: ICD-10-CM

## 2023-01-14 DIAGNOSIS — H16.223: ICD-10-CM

## 2023-01-14 DIAGNOSIS — G51.0: ICD-10-CM

## 2023-01-14 DIAGNOSIS — H11.153: ICD-10-CM

## 2023-01-14 DIAGNOSIS — H40.013: ICD-10-CM

## 2023-01-14 PROCEDURE — 92133 CPTRZD OPH DX IMG PST SGM ON: CPT | Performed by: OPHTHALMOLOGY

## 2023-01-14 PROCEDURE — 92083 EXTENDED VISUAL FIELD XM: CPT | Performed by: OPHTHALMOLOGY

## 2023-01-14 PROCEDURE — 68761 CLOSE TEAR DUCT OPENING: CPT | Performed by: OPHTHALMOLOGY

## 2023-01-14 PROCEDURE — 92012 INTRM OPH EXAM EST PATIENT: CPT | Performed by: OPHTHALMOLOGY

## 2023-01-14 PROCEDURE — 83861 MICROFLUID ANALY TEARS: CPT | Performed by: OPHTHALMOLOGY

## 2023-01-14 ASSESSMENT — KERATOMETRY
OS_AXISANGLE_DEGREES: 150
OD_K1POWER_DIOPTERS: 46.25
METHOD_AUTO_MANUAL: AUTO
OS_K2POWER_DIOPTERS: 46.50
OD_AXISANGLE_DEGREES: 057
OD_K2POWER_DIOPTERS: 47.00
OS_K1POWER_DIOPTERS: 46.25

## 2023-01-14 ASSESSMENT — SUPERFICIAL PUNCTATE KERATITIS (SPK)
OD_SPK: T 1+
OS_SPK: T

## 2023-01-14 ASSESSMENT — REFRACTION_AUTOREFRACTION
OD_CYLINDER: +0.75
OS_SPHERE: +1.00
OD_SPHERE: +0.75
OD_AXIS: 007
OS_CYLINDER: +0.50
OS_AXIS: 171

## 2023-01-14 ASSESSMENT — AXIALLENGTH_DERIVED
OD_AL: 22.1057
OS_AL: 22.1436

## 2023-01-14 ASSESSMENT — TONOMETRY
OS_IOP_MMHG: 14
OD_IOP_MMHG: 15
OS_IOP_MMHG: 14
OD_IOP_MMHG: 14

## 2023-01-14 ASSESSMENT — PACHYMETRY
OD_CT_UM: 530
OD_CT_CORRECTION: 1
OS_CT_CORRECTION: 1
OS_CT_UM: 536

## 2023-01-14 ASSESSMENT — SPHEQUIV_DERIVED
OD_SPHEQUIV: 1.125
OS_SPHEQUIV: 1.25

## 2023-01-14 ASSESSMENT — VISUAL ACUITY
OD_BCVA: 20/40
OS_BCVA: 20/40+2

## 2023-02-16 NOTE — ED PROVIDER NOTE - NS HIV RISK FACTOR YES
SS spoke with pt and her daughter's in room. Awaiting to see when Dr Pereira feels like pt is medically ready for discharge to [facility]. Plan now is for Della at discharge.    Declined

## 2023-08-15 ENCOUNTER — RX ONLY (RX ONLY)
Age: 63
End: 2023-08-15

## 2023-08-15 ENCOUNTER — OFFICE (OUTPATIENT)
Dept: URBAN - METROPOLITAN AREA CLINIC 27 | Facility: CLINIC | Age: 63
Setting detail: OPHTHALMOLOGY
End: 2023-08-15
Payer: COMMERCIAL

## 2023-08-15 DIAGNOSIS — H16.223: ICD-10-CM

## 2023-08-15 DIAGNOSIS — G51.0: ICD-10-CM

## 2023-08-15 DIAGNOSIS — H40.013: ICD-10-CM

## 2023-08-15 DIAGNOSIS — H25.13: ICD-10-CM

## 2023-08-15 DIAGNOSIS — H11.153: ICD-10-CM

## 2023-08-15 DIAGNOSIS — E11.9: ICD-10-CM

## 2023-08-15 PROCEDURE — 92014 COMPRE OPH EXAM EST PT 1/>: CPT | Performed by: OPHTHALMOLOGY

## 2023-08-15 PROCEDURE — 92250 FUNDUS PHOTOGRAPHY W/I&R: CPT | Performed by: OPHTHALMOLOGY

## 2023-08-15 ASSESSMENT — SUPERFICIAL PUNCTATE KERATITIS (SPK)
OD_SPK: T 1+
OS_SPK: T

## 2023-08-15 ASSESSMENT — PACHYMETRY
OS_CT_UM: 536
OD_CT_UM: 530
OD_CT_CORRECTION: 1
OS_CT_CORRECTION: 1

## 2023-08-15 ASSESSMENT — KERATOMETRY
OD_AXISANGLE_DEGREES: 058
OS_K2POWER_DIOPTERS: 46.50
OS_AXISANGLE_DEGREES: 179
OS_K1POWER_DIOPTERS: 46.25
METHOD_AUTO_MANUAL: AUTO
OD_K2POWER_DIOPTERS: 47.00
OD_K1POWER_DIOPTERS: 46.25

## 2023-08-15 ASSESSMENT — VISUAL ACUITY
OD_BCVA: 20/20-2
OS_BCVA: 20/40-1

## 2023-08-15 ASSESSMENT — REFRACTION_AUTOREFRACTION
OS_AXIS: 008
OS_SPHERE: +0.75
OD_AXIS: 005
OD_CYLINDER: +1.00
OD_SPHERE: +0.75
OS_CYLINDER: +1.25

## 2023-08-15 ASSESSMENT — TONOMETRY
OD_IOP_MMHG: 16
OS_IOP_MMHG: 16
OD_IOP_MMHG: 17
OS_IOP_MMHG: 18

## 2023-08-15 ASSESSMENT — SPHEQUIV_DERIVED
OD_SPHEQUIV: 1.25
OS_SPHEQUIV: 1.375

## 2023-08-15 ASSESSMENT — CONFRONTATIONAL VISUAL FIELD TEST (CVF)
OS_FINDINGS: FULL
OD_FINDINGS: FULL

## 2023-08-15 ASSESSMENT — AXIALLENGTH_DERIVED
OS_AL: 22.1008
OD_AL: 22.063

## 2023-10-28 NOTE — DISCHARGE NOTE ADULT - CARE PLAN
All imaging was negative and did not show any acute findings.    Take medications as prescribed for symptomatic treatment.    Alternate ice and moist heat to affected areas for comfort.    Follow-up with PCP.    Return for new or worsening symptoms  
Principal Discharge DX:	Asthma with acute exacerbation, unspecified asthma severity, unspecified whether persistent  Goal:	no further episodes and to prevent any complication  Assessment and plan of treatment:	-Continue Prednisone 40mg x 5 days   -Pulmonary Follow up with in 1 week   -Primary Care Physician Follow up in 1 week   SEEK MEDICAL CARE IF:  You have wheezing, shortness of breath (even at rest), or a cough even if taking medicine to prevent attacks.   You have difficulty eating, drinking, or talking due to asthma symptoms.  You have chest pain or you feel that your heart is beating fast.   You are lightheaded, dizzy, or faint.  You seem to be getting worse and are unresponsive to treatment during an asthma attack.  You are using a reliever medicine more than 2–3 times per week.  Secondary Diagnosis:	Type 2 diabetes mellitus without complication, without long-term current use of insulin  Assessment and plan of treatment:	Continue home medications   Make sure you get your HgA1c checked every three months.  If you take oral diabetes medications, check your blood glucose two times a day.  If you take insulin, check your blood glucose before meals and at bedtime.  It's important not to skip any meals.  Keep a log of your blood glucose results and always take it with you to your doctor appointments.  Keep a list of your current medications including injectables and over the counter medications and bring this medication list with you to all your doctor appointments.  If you have not seen your opthalmologist this year call for appointment.  Check your feet daily for redness, sores, or openings. Do not self treat. If no improvement in two days call your primary care physician for an appointment.

## 2024-02-29 ENCOUNTER — OFFICE (OUTPATIENT)
Dept: URBAN - METROPOLITAN AREA CLINIC 27 | Facility: CLINIC | Age: 64
Setting detail: OPHTHALMOLOGY
End: 2024-02-29
Payer: COMMERCIAL

## 2024-02-29 DIAGNOSIS — H16.223: ICD-10-CM

## 2024-02-29 DIAGNOSIS — H25.13: ICD-10-CM

## 2024-02-29 DIAGNOSIS — G51.0: ICD-10-CM

## 2024-02-29 DIAGNOSIS — H11.153: ICD-10-CM

## 2024-02-29 DIAGNOSIS — H40.1131: ICD-10-CM

## 2024-02-29 PROCEDURE — 92012 INTRM OPH EXAM EST PATIENT: CPT | Performed by: OPHTHALMOLOGY

## 2024-02-29 PROCEDURE — 92133 CPTRZD OPH DX IMG PST SGM ON: CPT | Performed by: OPHTHALMOLOGY

## 2024-02-29 PROCEDURE — 92083 EXTENDED VISUAL FIELD XM: CPT | Performed by: OPHTHALMOLOGY
